# Patient Record
Sex: FEMALE | Race: WHITE | NOT HISPANIC OR LATINO | Employment: OTHER | URBAN - METROPOLITAN AREA
[De-identification: names, ages, dates, MRNs, and addresses within clinical notes are randomized per-mention and may not be internally consistent; named-entity substitution may affect disease eponyms.]

---

## 2017-01-05 ENCOUNTER — HOSPITAL ENCOUNTER (OUTPATIENT)
Dept: MAMMOGRAPHY | Facility: HOSPITAL | Age: 57
Discharge: HOME | End: 2017-01-05
Attending: OBSTETRICS & GYNECOLOGY

## 2017-01-05 DIAGNOSIS — Z12.31 VISIT FOR SCREENING MAMMOGRAM: ICD-10-CM

## 2017-10-27 ENCOUNTER — RECORDS - HEALTHEAST (OUTPATIENT)
Dept: ADMINISTRATIVE | Facility: OTHER | Age: 57
End: 2017-10-27

## 2017-11-28 ENCOUNTER — TRANSFERRED RECORDS (OUTPATIENT)
Dept: HEALTH INFORMATION MANAGEMENT | Facility: CLINIC | Age: 57
End: 2017-11-28

## 2018-01-09 ENCOUNTER — CARE COORDINATION (OUTPATIENT)
Dept: CARDIOLOGY | Facility: CLINIC | Age: 58
End: 2018-01-09

## 2018-01-09 DIAGNOSIS — I48.0 PAROXYSMAL ATRIAL FIBRILLATION (H): Primary | ICD-10-CM

## 2018-01-09 NOTE — PROGRESS NOTES
Patient calls in stating she has had atrial fibrillation 3x in past 12 months - each time has occurred after she rides her horse. She exercises without issue.     She takes Toprol XL 50mg daily.    AF symptoms:  - fast heart beats, palpitations  - slightly SOB  - fatigue  - last episode lasted ~ 8 hours (1/4/18).    Her baseline resting HR is 50-60bpm. She notices her Apple Watch sometimes registers heart rates between 120-180bpm while at rest. These subside spontaneously.    She would like to discuss if a pill in the pocket approach is appropriate for her and if she requires blood thinner.      Appt scheduled to see Dr. Burns on 1/15/17 with labs and echo prior. She will have PCP send over most recent labs (lipid profile).

## 2018-01-10 ENCOUNTER — PRE VISIT (OUTPATIENT)
Dept: CARDIOLOGY | Facility: CLINIC | Age: 58
End: 2018-01-10

## 2018-01-10 DIAGNOSIS — I48.0 PAROXYSMAL ATRIAL FIBRILLATION (H): Primary | ICD-10-CM

## 2018-01-15 ENCOUNTER — OFFICE VISIT (OUTPATIENT)
Dept: CARDIOLOGY | Facility: CLINIC | Age: 58
End: 2018-01-15
Attending: INTERNAL MEDICINE
Payer: COMMERCIAL

## 2018-01-15 ENCOUNTER — RADIANT APPOINTMENT (OUTPATIENT)
Dept: CARDIOLOGY | Facility: CLINIC | Age: 58
End: 2018-01-15
Payer: COMMERCIAL

## 2018-01-15 ENCOUNTER — HOSPITAL ENCOUNTER (OUTPATIENT)
Dept: CARDIOLOGY | Facility: CLINIC | Age: 58
Discharge: HOME | End: 2018-01-15
Attending: INTERNAL MEDICINE | Admitting: INTERNAL MEDICINE
Payer: COMMERCIAL

## 2018-01-15 VITALS
WEIGHT: 163 LBS | HEART RATE: 58 BPM | BODY MASS INDEX: 24.71 KG/M2 | OXYGEN SATURATION: 98 % | HEIGHT: 68 IN | DIASTOLIC BLOOD PRESSURE: 89 MMHG | SYSTOLIC BLOOD PRESSURE: 139 MMHG

## 2018-01-15 DIAGNOSIS — I48.0 PAROXYSMAL ATRIAL FIBRILLATION (H): ICD-10-CM

## 2018-01-15 DIAGNOSIS — I48.0 PAROXYSMAL ATRIAL FIBRILLATION (H): Primary | ICD-10-CM

## 2018-01-15 LAB
ALBUMIN SERPL-MCNC: 3.8 G/DL (ref 3.4–5)
ALP SERPL-CCNC: 51 U/L (ref 40–150)
ALT SERPL W P-5'-P-CCNC: 22 U/L (ref 0–50)
ANION GAP SERPL CALCULATED.3IONS-SCNC: 5 MMOL/L (ref 3–14)
AST SERPL W P-5'-P-CCNC: 20 U/L (ref 0–45)
BILIRUB SERPL-MCNC: 0.7 MG/DL (ref 0.2–1.3)
BUN SERPL-MCNC: 16 MG/DL (ref 7–30)
CALCIUM SERPL-MCNC: 8.7 MG/DL (ref 8.5–10.1)
CHLORIDE SERPL-SCNC: 105 MMOL/L (ref 94–109)
CO2 SERPL-SCNC: 31 MMOL/L (ref 20–32)
CREAT SERPL-MCNC: 0.85 MG/DL (ref 0.52–1.04)
ERYTHROCYTE [DISTWIDTH] IN BLOOD BY AUTOMATED COUNT: 12.4 % (ref 10–15)
GFR SERPL CREATININE-BSD FRML MDRD: 68 ML/MIN/1.7M2
GLUCOSE SERPL-MCNC: 117 MG/DL (ref 70–99)
HCT VFR BLD AUTO: 41.5 % (ref 35–47)
HGB BLD-MCNC: 13.4 G/DL (ref 11.7–15.7)
MCH RBC QN AUTO: 31.2 PG (ref 26.5–33)
MCHC RBC AUTO-ENTMCNC: 32.3 G/DL (ref 31.5–36.5)
MCV RBC AUTO: 97 FL (ref 78–100)
PLATELET # BLD AUTO: 199 10E9/L (ref 150–450)
POTASSIUM SERPL-SCNC: 3.8 MMOL/L (ref 3.4–5.3)
PROT SERPL-MCNC: 7.3 G/DL (ref 6.8–8.8)
RBC # BLD AUTO: 4.3 10E12/L (ref 3.8–5.2)
SODIUM SERPL-SCNC: 142 MMOL/L (ref 133–144)
TSH SERPL DL<=0.005 MIU/L-ACNC: 0.9 MU/L (ref 0.4–4)
WBC # BLD AUTO: 5.2 10E9/L (ref 4–11)

## 2018-01-15 PROCEDURE — G0463 HOSPITAL OUTPT CLINIC VISIT: HCPCS | Mod: 25,ZF

## 2018-01-15 PROCEDURE — 93790 AMBL BP MNTR W/SW I&R: CPT | Performed by: INTERNAL MEDICINE

## 2018-01-15 PROCEDURE — 80053 COMPREHEN METABOLIC PANEL: CPT | Performed by: INTERNAL MEDICINE

## 2018-01-15 PROCEDURE — 93786 AMBL BP MNTR W/SW REC ONLY: CPT | Performed by: INTERNAL MEDICINE

## 2018-01-15 PROCEDURE — 36415 COLL VENOUS BLD VENIPUNCTURE: CPT | Performed by: INTERNAL MEDICINE

## 2018-01-15 PROCEDURE — 93005 ELECTROCARDIOGRAM TRACING: CPT | Mod: ZF

## 2018-01-15 PROCEDURE — 84443 ASSAY THYROID STIM HORMONE: CPT | Performed by: INTERNAL MEDICINE

## 2018-01-15 PROCEDURE — 99204 OFFICE O/P NEW MOD 45 MIN: CPT | Mod: 25 | Performed by: INTERNAL MEDICINE

## 2018-01-15 PROCEDURE — 85027 COMPLETE CBC AUTOMATED: CPT | Performed by: INTERNAL MEDICINE

## 2018-01-15 ASSESSMENT — PAIN SCALES - GENERAL: PAINLEVEL: NO PAIN (0)

## 2018-01-15 NOTE — MR AVS SNAPSHOT
After Visit Summary   1/15/2018    Marilee Weston    MRN: [id number]           Patient Information     Date Of Birth          1960        Visit Information        Provider Department      1/15/2018 9:20 AM Mayra Burns MD [facility]        Today's Diagnoses     Paroxysmal atrial fibrillation (H)    -  1      Care Instructions    Cardiology Provider you saw in clinic today: Dr. Burns    Labs/Tests needed:     1. 24 hour blood pressure monitor     Follow-up Visit:  As needed    Further Instructions:  Please look into the AliveCor device        Please contact us via [employer] for questions or concerns.    Kelley Petit RN   Electrophysiology Nurse Coordinator.  [phone]    If your question concerns the schedule/appointment times, contact:  [facility]   [phone]    Device Clinic (Pacemakers, ICD, Loop Recorders)   [phone]      You will receive all normal lab and testing results via [employer] or mail if not reviewed in clinic today.   If you need a medication refill please contact your pharmacy.    As always, thank you for trusting us with your health care needs!            Follow-ups after your visit        Your next 10 appointments already scheduled     Miguel 15, 2018 11:00 AM CST   24 Hour Blood Pressure Monitor with [facility]   [facility] ([facility] [place])    [address] [facility] [address]                 Future tests that were ordered for you today     Open Future Orders        Priority Expected Expires Ordered    24 Hour Blood Pressure Monitor - Adult Routine 1/15/2018 3/1/2018 1/15/2018            Who to contact     If you have questions or need follow up information about today's clinic visit or your schedule please contact [facility] directly at [phone].  Normal or non-critical lab and imaging results will be communicated to you by MyChart, letter or phone within 4 business days after  "the clinic has received the results. If you do not hear from us within 7 days, please contact the clinic through [employer] or phone. If you have a critical or abnormal lab result, we will notify you by phone as soon as possible.  Submit refill requests through [employer] or call your pharmacy and they will forward the refill request to us. Please allow 3 business days for your refill to be completed.          Additional Information About Your Visit        [employer] Information     [employer] gives you secure access to your electronic health record. If you see a primary care provider, you can also send messages to your care team and make appointments. If you have questions, please call your primary care clinic.  If you do not have a primary care provider, please call [phone] and they will assist you.        Care EveryWhere ID     This is your Care EveryWhere ID. This could be used by other organizations to access your [place] medical records  [id number]        Your Vitals Were     Pulse Height Pulse Oximetry BMI (Body Mass Index)          58 1.727 m (5' 8\") 98% 24.78 kg/m2         Blood Pressure from Last 3 Encounters:   01/15/18 139/89   11/27/13 105/68   12/28/11 123/54    Weight from Last 3 Encounters:   01/15/18 73.9 kg (163 lb)   11/27/13 69.2 kg (152 lb 8 oz)   12/28/11 75.8 kg (167 lb)              We Performed the Following     EKG 12-lead, tracing only (Same Day)     Follow-Up with Electrophysiologist        Primary Care Provider Office Phone # Fax #    David Sierra [phone]       [facility] [address]  [place] [facility] [address]        Equal Access to Services     SANDY ALFARO : Hadii bk Carcamo, waaxda luqadaha, qaybta kaaljose ramon, brigitte richardson. So [facility] [phone].    ATENCIÓN: Si habla español, tiene a elise disposición servicios gratuitos de asistencia lingüística. Llame al [phone].    We comply with applicable " federal civil rights laws and [place] laws. We do not discriminate on the basis of race, color, national origin, age, disability, sex, sexual orientation, or gender identity.            Thank you!     Thank you for choosing [facility]  for your care. Our goal is always to provide you with excellent care. Hearing back from our patients is one way we can continue to improve our services. Please take a few minutes to complete the written survey that you may receive in the mail after your visit with us. Thank you!             Your Updated Medication List - Protect others around you: Learn how to safely use, store and throw away your medicines at www.disposemymeds.org.          This list is accurate as of: 1/15/18 10:18 AM.  Always use your most recent med list.                   Brand Name Dispense Instructions for use Diagnosis    alendronate 70 MG tablet    FOSAMAX     Take 70 mg by mouth every 7 days        aspirin 81 MG tablet      Take 1 tablet by mouth daily.        Calcium 9727-1204 MG-UNIT Chew      Take 2 tablets by mouth daily.        CLIMARA PRO 0.045-0.015 MG/DAY Ptwk   Generic drug:  Estradiol-Levonorgestrel           fish oil-omega-3 fatty acids 1000 MG capsule      Take 1 g by mouth 2 times daily.        metoprolol succinate 50 MG 24 hr tablet    TOPROL XL    30 tablet    Take 1 tablet (50 mg) by mouth daily    Atrial fibrillation (H)       PREMPRO 0.45-1.5 MG per tablet   Generic drug:  estrogen conj-medroxyPROGESTERone      Take 1 tablet by mouth daily        SYNTHROID 100 MCG tablet   Generic drug:  levothyroxine      Take 100 mcg by mouth daily.        VAGIFEM 10 MCG Tabs vaginal tablet   Generic drug:  estradiol      Place 10 mcg vaginally daily        Vitamin D-3 5000 UNITS Tabs      Take  by mouth daily.

## 2018-01-15 NOTE — PROGRESS NOTES
HPI: Purpose of visit: Patient comes for follow-up of possible 3 atrial fibrillation episodes in the last 1 year.      Ms. Marilee Weston is a very pleasant 57-year-old woman with paroxysmal atrial fibrillation.  The patient's last visit with me was in 11/2013.  At that time, patient's atrial fibrillation was well-controlled by metoprolol 25 mg twice daily.     In March 2017, October 2017, and January 4, 2018 patient had episodes of elevated heart rates while she was riding a horse.  This lasted longer than usual and she thought that they were AF episodes.  There is no ECG documentation of those episodes.      Other than female gender, patient does not have any risk factors for stroke and, therefore, does not need to be on anticoagulation.  Patient does not have established hypertension but on measurement of blood pressure today her systolic blood pressures were elevated.    Other than palpitations, patient denies any symptoms of exertional dyspnea, exertional angina, frequent lightheadedness, presyncope or syncope.          PAST MEDICAL HISTORY:  Past Medical History:   Diagnosis Date     Depression      Osteopenia      Paroxysmal atrial fibrillation (H)        CURRENT MEDICATIONS:  Current Outpatient Prescriptions   Medication Sig Dispense Refill     Estradiol-Levonorgestrel (CLIMARA PRO) 0.045-0.015 MG/DAY PTWK        metoprolol (TOPROL XL) 50 MG 24 hr tablet Take 1 tablet (50 mg) by mouth daily 30 tablet 0     alendronate (FOSAMAX) 70 MG tablet Take 70 mg by mouth every 7 days       estradiol (VAGIFEM) 10 MCG TABS Place 10 mcg vaginally daily       levothyroxine (SYNTHROID) 100 MCG tablet Take 100 mcg by mouth daily.       CALCIUM 5586-1711 MG-UNIT CHEW Take 2 tablets by mouth daily.        fish oil-omega-3 fatty acids (FISH OIL) 1000 MG capsule Take 1 g by mouth 2 times daily.       aspirin 81 MG tablet Take 1 tablet by mouth daily.       Cholecalciferol (VITAMIN D-3) 5000 UNIT TABS Take  by mouth daily.        "estrogen conj-medroxyPROGESTERone (PREMPRO) 0.45-1.5 MG per tablet Take 1 tablet by mouth daily         PAST SURGICAL HISTORY:  Past Surgical History:   Procedure Laterality Date     STRIP VEIN  1975, 1986     THYROIDECTOMY   1986    Partial for benign thyroid tumor       ALLERGIES:     Allergies   Allergen Reactions     Penicillins Rash       FAMILY HISTORY:  - Premature coronary artery disease  - Atrial fibrillation  - Sudden cardiac death     SOCIAL HISTORY:  Social History   Substance Use Topics     Smoking status: Former Smoker     Types: Cigarettes     Quit date: 1/1/1984     Smokeless tobacco: Not on file     Alcohol use Yes      Comment: Occ.       ROS:   Constitutional: No fever, chills, or sweats. Weight stable.   ENT: No visual disturbance, ear ache, epistaxis, sore throat.   Cardiovascular: As per HPI.   Respiratory: No cough, hemoptysis.    GI: No nausea, vomiting, hematemesis, melena, or hematochezia.   : No hematuria.   Integument: Negative.   Psychiatric: Negative.   Hematologic:  Easy bruising, no easy bleeding.  Neuro: Negative.   Endocrinology: No significant heat or cold intolerance   Musculoskeletal: No myalgia.    Exam:  /89 (BP Location: Right arm, Patient Position: Chair, Cuff Size: Adult Regular)  Pulse 58  Ht 1.727 m (5' 8\")  Wt 73.9 kg (163 lb)  SpO2 98%  BMI 24.78 kg/m2  GENERAL APPEARANCE: healthy, alert and no distress  HEENT: no icterus, no xanthelasmas, normal pupil size and reaction, normal palate, mucosa moist, no central cyanosis  NECK: no adenopathy, no asymmetry, masses, or scars, thyroid normal to palpation and no bruits, JVP not elevated  RESPIRATORY: lungs clear to auscultation - no rales, rhonchi or wheezes, no use of accessory muscles, no retractions, respirations are unlabored, normal respiratory rate  CARDIOVASCULAR: regular rhythm, normal S1 with physiologic split S2, no S3 or S4 and no murmur, click or rub, precordium quiet with normal PMI.  ABDOMEN: soft, " non tender, without hepatosplenomegaly, no masses palpable, bowel sounds normal, aorta not enlarged by palpation, no abdominal bruits  EXTREMITIES: peripheral pulses normal, no edema, no bruits  NEURO: alert and oriented to person/place/time, normal speech, gait and affect  VASC: Radial, femoral, dorsalis pedis and posterior tibialis pulses are normal in volumes and symmetric bilaterally. No bruits are heard.  SKIN: no ecchymoses, no rashes    Labs:  CBC RESULTS:   Lab Results   Component Value Date    WBC 5.2 01/15/2018    RBC 4.30 01/15/2018    HGB 13.4 01/15/2018    HCT 41.5 01/15/2018    MCV 97 01/15/2018    MCH 31.2 01/15/2018    MCHC 32.3 01/15/2018    RDW 12.4 01/15/2018     01/15/2018       BMP RESULTS:  Lab Results   Component Value Date     01/15/2018    POTASSIUM 3.8 01/15/2018    CHLORIDE 105 01/15/2018    CO2 31 01/15/2018    ANIONGAP 5 01/15/2018     (H) 01/15/2018    BUN 16 01/15/2018    CR 0.85 01/15/2018    GFRESTIMATED 68 01/15/2018    GFRESTBLACK 82 01/15/2018    AGUSTINA 8.7 01/15/2018        INR RESULTS:  No results found for: INR    Procedures:      Assessment and Plan:     Paroxysmal atrial fibrillation.    Although patient felt that those episodes were AF, we do not have any ECG documentation.  We discussed the various options which include pill in the pocket flecainide, daily flecainide, or watchful waiting.  After an extensive discussion we decided on watchful waiting.  To that end, I recommended an Alive Cor device to record her underlying heart rhythm the next time she has symptoms.    In regards to the question of whether or not she has hypertension, patient will wear a 24-hour blood pressure monitor.    There is no scheduled follow-up.  Patient will contact us the next time she has symptoms.    All questions and concerns were addressed and patient is happy with plan.      CC  Patient Care Team:  David Sierra MD as PCP - General (Osteopathy)  Kelley Petit RN as  Nurse Coordinator (Clinical Cardiac Electrophysiology)  Mayra Burns MD as MD (Cardiology)  CORBY SEVERINO

## 2018-01-15 NOTE — NURSING NOTE
Chief Complaint   Patient presents with     Follow Up For     Paroxysmal atrial fib. Labs, echo, EKG.     Vitals were taken and medications were reconciled. EKG was performed.    YOLANDA De La Cruz  9:18 AM

## 2018-01-15 NOTE — LETTER
1/15/2018      RE: Marilee Weston  [address] [facility] [facility] [address] [facility] [address]       Dear Colleague,    Thank you for the opportunity to participate in the care of your patient, Marilee Weston, at the [facility]. Please see a copy of my visit note below.    HPI: Purpose of visit: Patient comes for follow-up of possible 3 atrial fibrillation episodes in the last 1 year.      Ms. Marilee Weston is a very pleasant 57-year-old woman with paroxysmal atrial fibrillation.  The patient's last visit with me was in 11/2013.  At that time, patient's atrial fibrillation was well-controlled by metoprolol 25 mg twice daily.     In March 2017, October 2017, and January 4, 2018 patient had episodes of elevated heart rates while she was riding a horse.  This lasted longer than usual and she thought that they were AF episodes.  There is no ECG documentation of those episodes.      Other than female gender, patient does not have any risk factors for stroke and, therefore, does not need to be on anticoagulation.  Patient does not have established hypertension but on measurement of blood pressure today her systolic blood pressures were elevated.    Other than palpitations, patient denies any symptoms of exertional dyspnea, exertional angina, frequent lightheadedness, presyncope or syncope.          PAST MEDICAL HISTORY:  Past Medical History:   Diagnosis Date     Depression      Osteopenia      Paroxysmal atrial fibrillation (H)        CURRENT MEDICATIONS:  Current Outpatient Prescriptions   Medication Sig Dispense Refill     Estradiol-Levonorgestrel (CLIMARA PRO) 0.045-0.015 MG/DAY PTWK        metoprolol (TOPROL XL) 50 MG 24 hr tablet Take 1 tablet (50 mg) by mouth daily 30 tablet 0     alendronate (FOSAMAX) 70 MG tablet Take 70 mg by mouth every 7 days       estradiol (VAGIFEM) 10 MCG TABS Place 10 mcg vaginally daily       levothyroxine (SYNTHROID) 100 MCG tablet Take 100 mcg by  "mouth daily.       CALCIUM 3357-7767 MG-UNIT CHEW Take 2 tablets by mouth daily.        fish oil-omega-3 fatty acids (FISH OIL) 1000 MG capsule Take 1 g by mouth 2 times daily.       aspirin 81 MG tablet Take 1 tablet by mouth daily.       Cholecalciferol (VITAMIN D-3) 5000 UNIT TABS Take  by mouth daily.       estrogen conj-medroxyPROGESTERone (PREMPRO) 0.45-1.5 MG per tablet Take 1 tablet by mouth daily         PAST SURGICAL HISTORY:  Past Surgical History:   Procedure Laterality Date     STRIP VEIN  1975, 1986     THYROIDECTOMY   1986    Partial for benign thyroid tumor       ALLERGIES:     Allergies   Allergen Reactions     Penicillins Rash       FAMILY HISTORY:  - Premature coronary artery disease  - Atrial fibrillation  - Sudden cardiac death     SOCIAL HISTORY:  Social History   Substance Use Topics     Smoking status: Former Smoker     Types: Cigarettes     Quit date: 1/1/1984     Smokeless tobacco: Not on file     Alcohol use Yes      Comment: Occ.       ROS:   Constitutional: No fever, chills, or sweats. Weight stable.   ENT: No visual disturbance, ear ache, epistaxis, sore throat.   Cardiovascular: As per HPI.   Respiratory: No cough, hemoptysis.    GI: No nausea, vomiting, hematemesis, melena, or hematochezia.   : No hematuria.   Integument: Negative.   Psychiatric: Negative.   Hematologic:  Easy bruising, no easy bleeding.  Neuro: Negative.   Endocrinology: No significant heat or cold intolerance   Musculoskeletal: No myalgia.    Exam:  /89 (BP Location: Right arm, Patient Position: Chair, Cuff Size: Adult Regular)  Pulse 58  Ht 1.727 m (5' 8\")  Wt 73.9 kg (163 lb)  SpO2 98%  BMI 24.78 kg/m2  GENERAL APPEARANCE: healthy, alert and no distress  HEENT: no icterus, no xanthelasmas, normal pupil size and reaction, normal palate, mucosa moist, no central cyanosis  NECK: no adenopathy, no asymmetry, masses, or scars, thyroid normal to palpation and no bruits, JVP not elevated  RESPIRATORY: lungs " clear to auscultation - no rales, rhonchi or wheezes, no use of accessory muscles, no retractions, respirations are unlabored, normal respiratory rate  CARDIOVASCULAR: regular rhythm, normal S1 with physiologic split S2, no S3 or S4 and no murmur, click or rub, precordium quiet with normal PMI.  ABDOMEN: soft, non tender, without hepatosplenomegaly, no masses palpable, bowel sounds normal, aorta not enlarged by palpation, no abdominal bruits  EXTREMITIES: peripheral pulses normal, no edema, no bruits  NEURO: alert and oriented to person/place/time, normal speech, gait and affect  VASC: Radial, femoral, dorsalis pedis and posterior tibialis pulses are normal in volumes and symmetric bilaterally. No bruits are heard.  SKIN: no ecchymoses, no rashes    Labs:  CBC RESULTS:   Lab Results   Component Value Date    WBC 5.2 01/15/2018    RBC 4.30 01/15/2018    HGB 13.4 01/15/2018    HCT 41.5 01/15/2018    MCV 97 01/15/2018    MCH 31.2 01/15/2018    MCHC 32.3 01/15/2018    RDW 12.4 01/15/2018     01/15/2018       BMP RESULTS:  Lab Results   Component Value Date     01/15/2018    POTASSIUM 3.8 01/15/2018    CHLORIDE 105 01/15/2018    CO2 31 01/15/2018    ANIONGAP 5 01/15/2018     (H) 01/15/2018    BUN 16 01/15/2018    CR 0.85 01/15/2018    GFRESTIMATED 68 01/15/2018    GFRESTBLACK 82 01/15/2018    AGUSTINA 8.7 01/15/2018        INR RESULTS:  No results found for: INR    Procedures:      Assessment and Plan:     Paroxysmal atrial fibrillation.    Although patient felt that those episodes were AF, we do not have any ECG documentation.  We discussed the various options which include pill in the pocket flecainide, daily flecainide, or watchful waiting.  After an extensive discussion we decided on watchful waiting.  To that end, I recommended an Alive Cor device to record her underlying heart rhythm the next time she has symptoms.    In regards to the question of whether or not she has hypertension, patient will  wear a 24-hour blood pressure monitor.    There is no scheduled follow-up.  Patient will contact us the next time she has symptoms.    All questions and concerns were addressed and patient is happy with plan.      CC  Patient Care Team:  David Severino MD as PCP - General (Osteopathy)  Kelley Petit RN as Nurse Coordinator (Clinical Cardiac Electrophysiology)  Mayra Burns MD as MD (Cardiology)  DAVID SEVERINO     Results already discussed in clinic or will be discussed on a clinic visit.

## 2018-01-15 NOTE — PATIENT INSTRUCTIONS
Cardiology Provider you saw in clinic today: Dr. Burns    Labs/Tests needed:     1. 24 hour blood pressure monitor     Follow-up Visit:  As needed    Further Instructions:  Please look into the AliveCor device        Please contact us via [employer] for questions or concerns.    Kelley Petit RN   Electrophysiology Nurse Coordinator.  [phone]    If your question concerns the schedule/appointment times, contact:  [facility]   [phone]    Device Clinic (Pacemakers, ICD, Loop Recorders)   [phone]      You will receive all normal lab and testing results via [employer] or mail if not reviewed in clinic today.   If you need a medication refill please contact your pharmacy.    As always, thank you for trusting us with your health care needs!

## 2018-01-16 LAB — INTERPRETATION ECG - MUSE: NORMAL

## 2018-01-21 ENCOUNTER — RECORDS - HEALTHEAST (OUTPATIENT)
Dept: ADMINISTRATIVE | Facility: OTHER | Age: 58
End: 2018-01-21

## 2018-01-21 ENCOUNTER — HEALTH MAINTENANCE LETTER (OUTPATIENT)
Age: 58
End: 2018-01-21

## 2018-01-31 ENCOUNTER — HOSPITAL ENCOUNTER (OUTPATIENT)
Dept: MAMMOGRAPHY | Facility: HOSPITAL | Age: 58
Discharge: HOME | End: 2018-01-31
Attending: OBSTETRICS & GYNECOLOGY

## 2018-01-31 DIAGNOSIS — Z12.31 VISIT FOR SCREENING MAMMOGRAM: ICD-10-CM

## 2018-02-02 ENCOUNTER — RECORDS - HEALTHEAST (OUTPATIENT)
Dept: ADMINISTRATIVE | Facility: OTHER | Age: 58
End: 2018-02-02

## 2018-02-05 ENCOUNTER — RECORDS - HEALTHEAST (OUTPATIENT)
Dept: ADMINISTRATIVE | Facility: OTHER | Age: 58
End: 2018-02-05

## 2018-04-12 ENCOUNTER — RECORDS - HEALTHEAST (OUTPATIENT)
Dept: ADMINISTRATIVE | Facility: OTHER | Age: 58
End: 2018-04-12

## 2018-05-29 ENCOUNTER — RECORDS - HEALTHEAST (OUTPATIENT)
Dept: ADMINISTRATIVE | Facility: OTHER | Age: 58
End: 2018-05-29

## 2018-06-13 ENCOUNTER — RECORDS - HEALTHEAST (OUTPATIENT)
Dept: BONE DENSITY | Facility: CLINIC | Age: 58
End: 2018-06-13

## 2018-06-13 ENCOUNTER — RECORDS - HEALTHEAST (OUTPATIENT)
Dept: ADMINISTRATIVE | Facility: OTHER | Age: 58
End: 2018-06-13

## 2018-06-13 DIAGNOSIS — M85.80 OTHER SPECIFIED DISORDERS OF BONE DENSITY AND STRUCTURE, UNSPECIFIED SITE: ICD-10-CM

## 2018-06-18 ENCOUNTER — HOSPITAL ENCOUNTER (OUTPATIENT)
Dept: RADIOLOGY | Facility: HOSPITAL | Age: 58
Discharge: HOME | End: 2018-06-18
Attending: FAMILY MEDICINE

## 2018-06-18 ENCOUNTER — HOSPITAL ENCOUNTER (OUTPATIENT)
Dept: MRI IMAGING | Facility: HOSPITAL | Age: 58
Discharge: HOME | End: 2018-06-18
Attending: FAMILY MEDICINE

## 2018-06-18 DIAGNOSIS — S22.31XA RIGHT RIB FRACTURE: ICD-10-CM

## 2018-06-18 DIAGNOSIS — S49.90XA: ICD-10-CM

## 2018-11-12 ENCOUNTER — RECORDS - HEALTHEAST (OUTPATIENT)
Dept: ADMINISTRATIVE | Facility: OTHER | Age: 58
End: 2018-11-12

## 2019-03-02 ENCOUNTER — RECORDS - HEALTHEAST (OUTPATIENT)
Dept: ADMINISTRATIVE | Facility: OTHER | Age: 59
End: 2019-03-02

## 2019-03-08 ENCOUNTER — HOSPITAL ENCOUNTER (OUTPATIENT)
Dept: MAMMOGRAPHY | Facility: CLINIC | Age: 59
Discharge: HOME | End: 2019-03-08
Attending: FAMILY MEDICINE

## 2019-03-08 DIAGNOSIS — Z12.31 VISIT FOR SCREENING MAMMOGRAM: ICD-10-CM

## 2019-03-11 ENCOUNTER — RECORDS - HEALTHEAST (OUTPATIENT)
Dept: ADMINISTRATIVE | Facility: OTHER | Age: 59
End: 2019-03-11

## 2019-03-13 ENCOUNTER — RECORDS - HEALTHEAST (OUTPATIENT)
Dept: ADMINISTRATIVE | Facility: OTHER | Age: 59
End: 2019-03-13

## 2019-03-13 ENCOUNTER — TELEPHONE (OUTPATIENT)
Dept: CARDIOLOGY | Facility: CLINIC | Age: 59
End: 2019-03-13

## 2019-03-13 DIAGNOSIS — I48.0 PAROXYSMAL ATRIAL FIBRILLATION (H): Primary | ICD-10-CM

## 2019-03-13 DIAGNOSIS — I48.91 ATRIAL FIBRILLATION (H): ICD-10-CM

## 2019-03-13 RX ORDER — METOPROLOL SUCCINATE 50 MG/1
TABLET, EXTENDED RELEASE ORAL
Qty: 45 TABLET | Refills: 0 | Status: SHIPPED | OUTPATIENT
Start: 2019-03-13 | End: 2019-06-17

## 2019-03-13 NOTE — TELEPHONE ENCOUNTER
Patient states she has increased AF episodes that are not related to riding her horse, as they were directly correlated to in the past. They are also lasting longer (~3 hours). She is symptomatic with palpitations, racing heart. Her rates in AF are 120s-140s per [place] Sepideh. Baseline HR: 60s. BP: 135/70s.    She spoke to the cardiologist on call last night that suggested increasing metoprolol or taking metoprolol as needed with onset of AF. Patient is leaving on vacation on March 15th.      Date: 3/13/2019    Time of Call: 4:02 PM     Diagnosis:  Increasing frequency of AF      [ VORB ] Ordering provider: Lou Schaffer NP   Order:   Increase Toprol XL to 75mg daily, can take additional 25mg if onset of AF.      Order received by: Kelley Petit RN      Follow-up/additional notes:   Called and spoke to patient.   Follow-up scheduled for 4/15 at 10:30 with Dr. Burns to discuss long term plan.

## 2019-03-13 NOTE — TELEPHONE ENCOUNTER
[facility] Call Center    Phone Message    May a detailed message be left on voicemail: yes    Reason for Call: Other: Pt calling to inform Dr. Burns that she had to call the triage line late last night for an episode of a. Fib. She states she's been on metoprolol for years with infrequent episodes but now, for the last 4-5 weeks, she's had three episodes that seem to happen for no reason and lasting for three hours each time. The nurse she spoke to recommended that she follow up with Dr. Burns as he may want to increase the dosage. Pt also mentioned that she is going on a cruise for 1.5 weeks and is worried about being on the cruise and dealing with current symptoms. Please call back to discuss.    Action Taken: Message routed to:  [facility] ([facility]

## 2019-03-18 ENCOUNTER — DOCUMENTATION ONLY (OUTPATIENT)
Dept: CARE COORDINATION | Facility: CLINIC | Age: 59
End: 2019-03-18

## 2019-04-03 ENCOUNTER — RECORDS - HEALTHEAST (OUTPATIENT)
Dept: ADMINISTRATIVE | Facility: OTHER | Age: 59
End: 2019-04-03

## 2019-04-12 ASSESSMENT — ENCOUNTER SYMPTOMS
PALPITATIONS: 1
EYE IRRITATION: 1
TREMORS: 1
BACK PAIN: 1

## 2019-04-15 ENCOUNTER — OFFICE VISIT (OUTPATIENT)
Dept: CARDIOLOGY | Facility: CLINIC | Age: 59
End: 2019-04-15
Attending: INTERNAL MEDICINE
Payer: COMMERCIAL

## 2019-04-15 VITALS
HEART RATE: 61 BPM | OXYGEN SATURATION: 99 % | BODY MASS INDEX: 25.37 KG/M2 | HEIGHT: 68 IN | SYSTOLIC BLOOD PRESSURE: 154 MMHG | DIASTOLIC BLOOD PRESSURE: 93 MMHG | WEIGHT: 167.4 LBS

## 2019-04-15 DIAGNOSIS — I48.0 PAROXYSMAL ATRIAL FIBRILLATION (H): ICD-10-CM

## 2019-04-15 DIAGNOSIS — I48.0 PAROXYSMAL ATRIAL FIBRILLATION (H): Primary | ICD-10-CM

## 2019-04-15 PROCEDURE — G0463 HOSPITAL OUTPT CLINIC VISIT: HCPCS

## 2019-04-15 PROCEDURE — 93010 ELECTROCARDIOGRAM REPORT: CPT | Mod: ZP | Performed by: INTERNAL MEDICINE

## 2019-04-15 PROCEDURE — 93005 ELECTROCARDIOGRAM TRACING: CPT | Mod: ZF

## 2019-04-15 PROCEDURE — 0296T ZIO PATCH HOLTER ADULT PEDIATRIC GREATER THAN 48 HRS: CPT | Mod: ZF

## 2019-04-15 PROCEDURE — 99215 OFFICE O/P EST HI 40 MIN: CPT | Mod: ZP | Performed by: INTERNAL MEDICINE

## 2019-04-15 RX ORDER — METOPROLOL TARTRATE 25 MG/1
25 TABLET, FILM COATED ORAL PRN
Qty: 60 TABLET | Refills: 0 | Status: SHIPPED | OUTPATIENT
Start: 2019-04-15

## 2019-04-15 ASSESSMENT — MIFFLIN-ST. JEOR: SCORE: 1377.82

## 2019-04-15 ASSESSMENT — PAIN SCALES - GENERAL: PAINLEVEL: MILD PAIN (3)

## 2019-04-15 NOTE — NURSING NOTE
Chief Complaint   Patient presents with     Follow Up     Follow-up increasing, sustained episodes of AF - Toprol XL increased to 75 + 25prn.

## 2019-04-15 NOTE — LETTER
4/15/2019      RE: Marilee Weston  [address] [facility] [id number]       Dear Colleague,    Thank you for the opportunity to participate in the care of your patient, Marilee Weston, at the [facility] HEART [facility]. Please see a copy of my visit note below.    HPI: Patient comes back for follow-up of atrial fibrillation    Patient is a 58-year-old woman whom I have been following for paroxysmal atrial fibrillation.  Patient does not have any risk factors for ischemic stroke.  In the last 3-4 months, patient has experienced an increase in the frequency of atrial fibrillation episodes up to several times a month.  These episodes manifest with palpitations and discomfort in the upper chest as well as the upper back.  There are some trigger factors which include drinking beer and riding her horse.    Patient is currently taking Toprol XL 50 mg once a day and aspirin 81 mg once a day.    She denies any symptoms of exertional dyspnea, exertional angina, frequent lightheadedness, presyncope or syncope.      PAST MEDICAL HISTORY:  Past Medical History:   Diagnosis Date     Depression      Osteopenia      Paroxysmal atrial fibrillation (H)        CURRENT MEDICATIONS:  Current Outpatient Medications   Medication Sig Dispense Refill     aspirin 81 MG tablet Take 1 tablet by mouth daily.       CALCIUM 4544-7816 MG-UNIT CHEW Take 2 tablets by mouth daily.        Cholecalciferol (VITAMIN D-3) 5000 UNIT TABS Take  by mouth daily.       estradiol (VAGIFEM) 10 MCG TABS Place 10 mcg vaginally daily       Estradiol-Levonorgestrel (CLIMARA PRO) 0.045-0.015 MG/DAY PTWK        estrogen conj-medroxyPROGESTERone (PREMPRO) 0.45-1.5 MG per tablet Take 1 tablet by mouth daily       fish oil-omega-3 fatty acids (FISH OIL) 1000 MG capsule Take 1 g by mouth 2 times daily.       levothyroxine (SYNTHROID) 100 MCG tablet Take 100 mcg by mouth daily.       metoprolol succinate ER (TOPROL XL) 50 MG  "24 hr tablet Take 75mg daily. May take additional 25mg with onset of AF. 45 tablet 0     metoprolol tartrate (LOPRESSOR) 25 MG tablet Take 1 tablet (25 mg) by mouth as needed (Take 25mg for AF episodes, if persists after 1-2 hours, take another 25mg.) 60 tablet 0     alendronate (FOSAMAX) 70 MG tablet Take 70 mg by mouth every 7 days         PAST SURGICAL HISTORY:  Past Surgical History:   Procedure Laterality Date     STRIP VEIN  ,      THYROIDECTOMY       Partial for benign thyroid tumor       ALLERGIES:     Allergies   Allergen Reactions     Penicillins Rash       FAMILY HISTORY:  - Premature coronary artery disease  - Atrial fibrillation  - Sudden cardiac death     SOCIAL HISTORY:  Social History     Tobacco Use     Smoking status: Former Smoker     Types: Cigarettes     Last attempt to quit: 1984     Years since quittin.3   Substance Use Topics     Alcohol use: Yes     Comment: Occ.     Drug use: No       ROS:   Constitutional: No fever, chills, or sweats. Weight stable.   ENT: No visual disturbance, ear ache, epistaxis, sore throat.   Cardiovascular: As per HPI.   Respiratory: No cough, hemoptysis.    GI: No nausea, vomiting, hematemesis, melena, or hematochezia.   : No hematuria.   Integument: Negative.   Psychiatric: Negative.   Hematologic:  Easy bruising, no easy bleeding.  Neuro: Negative.   Endocrinology: No significant heat or cold intolerance   Musculoskeletal: No myalgia.    Exam:  BP (!) 154/93 (BP Location: Right arm, Patient Position: Chair, Cuff Size: Adult Regular)   Pulse 61   Ht 1.727 m (5' 8\")   Wt 75.9 kg (167 lb 6.4 oz)   SpO2 99%   BMI 25.45 kg/m     GENERAL APPEARANCE: healthy, alert and no distress  HEENT: no icterus, no xanthelasmas, normal pupil size and reaction, normal palate, mucosa moist, no central cyanosis  NECK: no adenopathy, no asymmetry, masses, or scars, thyroid normal to palpation and no bruits, JVP not elevated  RESPIRATORY: lungs clear to " auscultation - no rales, rhonchi or wheezes, no use of accessory muscles, no retractions, respirations are unlabored, normal respiratory rate  CARDIOVASCULAR: regular rhythm, normal S1 with physiologic split S2, no S3 or S4 and no murmur, click or rub, precordium quiet with normal PMI.  ABDOMEN: soft, non tender, without hepatosplenomegaly, no masses palpable, bowel sounds normal, aorta not enlarged by palpation, no abdominal bruits  EXTREMITIES: peripheral pulses normal, no edema, no bruits  NEURO: alert and oriented to person/place/time, normal speech, gait and affect  VASC: Radial, femoral, dorsalis pedis and posterior tibialis pulses are normal in volumes and symmetric bilaterally. No bruits are heard.  SKIN: no ecchymoses, no rashes    Labs:  CBC RESULTS:   Lab Results   Component Value Date    WBC 5.2 01/15/2018    RBC 4.30 01/15/2018    HGB 13.4 01/15/2018    HCT 41.5 01/15/2018    MCV 97 01/15/2018    MCH 31.2 01/15/2018    MCHC 32.3 01/15/2018    RDW 12.4 01/15/2018     01/15/2018       BMP RESULTS:  Lab Results   Component Value Date     01/15/2018    POTASSIUM 3.8 01/15/2018    CHLORIDE 105 01/15/2018    CO2 31 01/15/2018    ANIONGAP 5 01/15/2018     (H) 01/15/2018    BUN 16 01/15/2018    CR 0.85 01/15/2018    GFRESTIMATED 68 01/15/2018    GFRESTBLACK 82 01/15/2018    AGUSTINA 8.7 01/15/2018        INR RESULTS:  No results found for: INR    Procedures:      Assessment and Plan:     Paroxysmal atrial fibrillation-increasing in frequency    I discussed in detail the various management options which include lifestyle modifications such as exercise training, antiarrhythmic drugs, and catheter ablation.  At this juncture, patient would like to avoid antiarrhythmic drugs and catheter ablation.    We will obtain a cardiac MRI with gadolinium to have an accurate assessment of the structure and function of the heart.  We will also place a Zio Patch monitor today for 2 weeks to document the AF  burden.  I will see patient in clinic after both tests have been completed.    I also recommended discontinuing the aspirin given that it does not confer any benefit in preventing ischemic stroke.  Patient will be going to [place] for 2 weeks.  We will give her a 30-day prescription for metoprolol tartrate 25 mg which she can take up to 2 tablets when she has an acute AF episode.    All questions and concerns were addressed and patient is happy with plan.      CC  Patient Care Team:  David Severino MD as PCP - General (Osteopathy)  Kelley Petit RN as Nurse Coordinator (Clinical Cardiac Electrophysiology)  Mayra Burns MD as MD (Cardiology)  DAVID SEVERINO

## 2019-04-15 NOTE — PATIENT INSTRUCTIONS
Cardiology Provider you saw in clinic today: Dr. Burns    Medication Changes:     1. Take metoprolol tartrate 25mg at the onset of AF. If it persists after 1-2 hours, take another 25mg.     Labs/Tests needed:     1.  14 day ziopatch   2. Cardiac MRI     Follow-up Visit:  When you return from your trip in May/early June.            Please contact us via [employer] for questions or concerns.    Kelley Petit RN   Electrophysiology Nurse Coordinator.  [phone]    If your question concerns the schedule/appointment times, contact:  [facility]   [phone]    Device Clinic (Pacemakers, ICD, Loop Recorders)   [phone]      You will receive all normal lab and testing results via [employer] or mail if not reviewed in clinic today.   If you need a medication refill please contact your pharmacy.    As always, thank you for trusting us with your health care needs!

## 2019-04-15 NOTE — PROGRESS NOTES
HPI: Patient comes back for follow-up of atrial fibrillation    Patient is a 58-year-old woman whom I have been following for paroxysmal atrial fibrillation.  Patient does not have any risk factors for ischemic stroke.  In the last 3-4 months, patient has experienced an increase in the frequency of atrial fibrillation episodes up to several times a month.  These episodes manifest with palpitations and discomfort in the upper chest as well as the upper back.  There are some trigger factors which include drinking beer and riding her horse.    Patient is currently taking Toprol XL 50 mg once a day and aspirin 81 mg once a day.    She denies any symptoms of exertional dyspnea, exertional angina, frequent lightheadedness, presyncope or syncope.      PAST MEDICAL HISTORY:  Past Medical History:   Diagnosis Date     Depression      Osteopenia      Paroxysmal atrial fibrillation (H)        CURRENT MEDICATIONS:  Current Outpatient Medications   Medication Sig Dispense Refill     aspirin 81 MG tablet Take 1 tablet by mouth daily.       CALCIUM 7892-7916 MG-UNIT CHEW Take 2 tablets by mouth daily.        Cholecalciferol (VITAMIN D-3) 5000 UNIT TABS Take  by mouth daily.       estradiol (VAGIFEM) 10 MCG TABS Place 10 mcg vaginally daily       Estradiol-Levonorgestrel (CLIMARA PRO) 0.045-0.015 MG/DAY PTWK        estrogen conj-medroxyPROGESTERone (PREMPRO) 0.45-1.5 MG per tablet Take 1 tablet by mouth daily       fish oil-omega-3 fatty acids (FISH OIL) 1000 MG capsule Take 1 g by mouth 2 times daily.       levothyroxine (SYNTHROID) 100 MCG tablet Take 100 mcg by mouth daily.       metoprolol succinate ER (TOPROL XL) 50 MG 24 hr tablet Take 75mg daily. May take additional 25mg with onset of AF. 45 tablet 0     metoprolol tartrate (LOPRESSOR) 25 MG tablet Take 1 tablet (25 mg) by mouth as needed (Take 25mg for AF episodes, if persists after 1-2 hours, take another 25mg.) 60 tablet 0     alendronate (FOSAMAX) 70 MG tablet Take 70 mg  "by mouth every 7 days         PAST SURGICAL HISTORY:  Past Surgical History:   Procedure Laterality Date     STRIP VEIN  ,      THYROIDECTOMY       Partial for benign thyroid tumor       ALLERGIES:     Allergies   Allergen Reactions     Penicillins Rash       FAMILY HISTORY:  - Premature coronary artery disease  - Atrial fibrillation  - Sudden cardiac death     SOCIAL HISTORY:  Social History     Tobacco Use     Smoking status: Former Smoker     Types: Cigarettes     Last attempt to quit: 1984     Years since quittin.3   Substance Use Topics     Alcohol use: Yes     Comment: Occ.     Drug use: No       ROS:   Constitutional: No fever, chills, or sweats. Weight stable.   ENT: No visual disturbance, ear ache, epistaxis, sore throat.   Cardiovascular: As per HPI.   Respiratory: No cough, hemoptysis.    GI: No nausea, vomiting, hematemesis, melena, or hematochezia.   : No hematuria.   Integument: Negative.   Psychiatric: Negative.   Hematologic:  Easy bruising, no easy bleeding.  Neuro: Negative.   Endocrinology: No significant heat or cold intolerance   Musculoskeletal: No myalgia.    Exam:  BP (!) 154/93 (BP Location: Right arm, Patient Position: Chair, Cuff Size: Adult Regular)   Pulse 61   Ht 1.727 m (5' 8\")   Wt 75.9 kg (167 lb 6.4 oz)   SpO2 99%   BMI 25.45 kg/m    GENERAL APPEARANCE: healthy, alert and no distress  HEENT: no icterus, no xanthelasmas, normal pupil size and reaction, normal palate, mucosa moist, no central cyanosis  NECK: no adenopathy, no asymmetry, masses, or scars, thyroid normal to palpation and no bruits, JVP not elevated  RESPIRATORY: lungs clear to auscultation - no rales, rhonchi or wheezes, no use of accessory muscles, no retractions, respirations are unlabored, normal respiratory rate  CARDIOVASCULAR: regular rhythm, normal S1 with physiologic split S2, no S3 or S4 and no murmur, click or rub, precordium quiet with normal PMI.  ABDOMEN: soft, non tender, " without hepatosplenomegaly, no masses palpable, bowel sounds normal, aorta not enlarged by palpation, no abdominal bruits  EXTREMITIES: peripheral pulses normal, no edema, no bruits  NEURO: alert and oriented to person/place/time, normal speech, gait and affect  VASC: Radial, femoral, dorsalis pedis and posterior tibialis pulses are normal in volumes and symmetric bilaterally. No bruits are heard.  SKIN: no ecchymoses, no rashes    Labs:  CBC RESULTS:   Lab Results   Component Value Date    WBC 5.2 01/15/2018    RBC 4.30 01/15/2018    HGB 13.4 01/15/2018    HCT 41.5 01/15/2018    MCV 97 01/15/2018    MCH 31.2 01/15/2018    MCHC 32.3 01/15/2018    RDW 12.4 01/15/2018     01/15/2018       BMP RESULTS:  Lab Results   Component Value Date     01/15/2018    POTASSIUM 3.8 01/15/2018    CHLORIDE 105 01/15/2018    CO2 31 01/15/2018    ANIONGAP 5 01/15/2018     (H) 01/15/2018    BUN 16 01/15/2018    CR 0.85 01/15/2018    GFRESTIMATED 68 01/15/2018    GFRESTBLACK 82 01/15/2018    AGUSTINA 8.7 01/15/2018        INR RESULTS:  No results found for: INR    Procedures:      Assessment and Plan:     Paroxysmal atrial fibrillation-increasing in frequency    I discussed in detail the various management options which include lifestyle modifications such as exercise training, antiarrhythmic drugs, and catheter ablation.  At this juncture, patient would like to avoid antiarrhythmic drugs and catheter ablation.    We will obtain a cardiac MRI with gadolinium to have an accurate assessment of the structure and function of the heart.  We will also place a Zio Patch monitor today for 2 weeks to document the AF burden.  I will see patient in clinic after both tests have been completed.    I also recommended discontinuing the aspirin given that it does not confer any benefit in preventing ischemic stroke.  Patient will be going to [place] for 2 weeks.  We will give her a 30-day prescription for metoprolol tartrate 25 mg which  she can take up to 2 tablets when she has an acute AF episode.    All questions and concerns were addressed and patient is happy with plan.      CC  Patient Care Team:  David Severino MD as PCP - General (Osteopathy)  Kelley Petit RN as Nurse Coordinator (Clinical Cardiac Electrophysiology)  Mayra Burns MD as MD (Cardiology)  DAVID SEVERINO

## 2019-04-15 NOTE — PROGRESS NOTES
Per Dr. Mayra Burns, patient to have 14 day Zio patch monitor placed.  Diagnosis: Paroxysmal atrial fibrillation  Monitor placed: Yes  Patient Instructed: Yes  Patient verbalized understanding: Yes  Holter # [id number]    Placed by: Yamini Mejias CMA

## 2019-04-16 LAB — INTERPRETATION ECG - MUSE: NORMAL

## 2019-04-24 ENCOUNTER — OFFICE VISIT - HEALTHEAST (OUTPATIENT)
Dept: INTERNAL MEDICINE | Facility: CLINIC | Age: 59
End: 2019-04-24

## 2019-04-24 DIAGNOSIS — Z12.11 SCREEN FOR COLON CANCER: ICD-10-CM

## 2019-04-24 DIAGNOSIS — I48.0 PAROXYSMAL ATRIAL FIBRILLATION (H): ICD-10-CM

## 2019-04-24 DIAGNOSIS — E89.0 POSTSURGICAL HYPOTHYROIDISM: ICD-10-CM

## 2019-04-24 DIAGNOSIS — N95.2 ATROPHIC VAGINITIS: ICD-10-CM

## 2019-04-24 DIAGNOSIS — M79.673 PAIN OF FOOT, UNSPECIFIED LATERALITY: ICD-10-CM

## 2019-04-24 DIAGNOSIS — G25.89 SCAPULAR DYSKINESIS: ICD-10-CM

## 2019-04-24 DIAGNOSIS — M85.80 OSTEOPENIA, UNSPECIFIED LOCATION: ICD-10-CM

## 2019-04-24 DIAGNOSIS — S22.41XA CLOSED FRACTURE OF MULTIPLE RIBS OF RIGHT SIDE, INITIAL ENCOUNTER: ICD-10-CM

## 2019-04-24 DIAGNOSIS — N95.1 MENOPAUSAL SYNDROME (HOT FLASHES): ICD-10-CM

## 2019-04-24 ASSESSMENT — MIFFLIN-ST. JEOR: SCORE: 1347.4

## 2019-04-29 LAB
CHOL/HDLC RATIO - QUEST: 2.7
CHOLEST SERPL-MCNC: 174 MG/DL
GLUCOSE SERPL-MCNC: 96 MG/DL (ref 70–99)
HDLC SERPL-MCNC: 65 MG/DL
LDLC SERPL CALC-MCNC: 93 MG/DL
TRIGL SERPL-MCNC: 78 MG/DL
TSH SERPL-ACNC: 0.83 MCU/ML

## 2019-05-01 ENCOUNTER — HOSPITAL ENCOUNTER (OUTPATIENT)
Dept: MRI IMAGING | Facility: CLINIC | Age: 59
Discharge: HOME | End: 2019-05-01
Attending: INTERNAL MEDICINE | Admitting: INTERNAL MEDICINE
Payer: COMMERCIAL

## 2019-05-01 DIAGNOSIS — I48.0 PAROXYSMAL ATRIAL FIBRILLATION (H): ICD-10-CM

## 2019-05-01 PROCEDURE — 75561 CARDIAC MRI FOR MORPH W/DYE: CPT | Mod: 26 | Performed by: INTERNAL MEDICINE

## 2019-05-01 PROCEDURE — A9585 GADOBUTROL INJECTION: HCPCS | Performed by: INTERNAL MEDICINE

## 2019-05-01 PROCEDURE — 25500064 ZZH RX 255 OP 636: Performed by: INTERNAL MEDICINE

## 2019-05-01 PROCEDURE — 75561 CARDIAC MRI FOR MORPH W/DYE: CPT

## 2019-05-01 RX ORDER — GADOBUTROL 604.72 MG/ML
10 INJECTION INTRAVENOUS ONCE
Status: COMPLETED | OUTPATIENT
Start: 2019-05-01 | End: 2019-05-01

## 2019-05-01 RX ADMIN — GADOBUTROL 10 ML: 604.72 INJECTION INTRAVENOUS at 15:32

## 2019-05-06 ENCOUNTER — TELEPHONE (OUTPATIENT)
Dept: CARDIOLOGY | Facility: CLINIC | Age: 59
End: 2019-05-06

## 2019-05-06 NOTE — TELEPHONE ENCOUNTER
Reviewed cMRI and ziopatch (from [employer] website) with patient showing <1% AF/AFL. Many triggered events in NSR.    She states the 25mg metoprolol does help to reduce the length of her AF.     Released cMRI on [employer], mailed ziopatch result per patient preference.

## 2019-05-06 NOTE — TELEPHONE ENCOUNTER
[facility] Call Center    Phone Message    May a detailed message be left on voicemail: yes    Reason for Call: Other: Pt is leaving for [facility], pt had a few tests done (cardiac MRI and ziopatch) from last week and is wondering if she can get the results before she leaves on her trip,  please call     Action Taken: Message routed to:  [facility] ([facility]): Cardiology

## 2019-05-22 ENCOUNTER — OFFICE VISIT - HEALTHEAST (OUTPATIENT)
Dept: INTERNAL MEDICINE | Facility: CLINIC | Age: 59
End: 2019-05-22

## 2019-05-22 DIAGNOSIS — E89.0 POSTSURGICAL HYPOTHYROIDISM: ICD-10-CM

## 2019-05-22 DIAGNOSIS — H26.9 CATARACT OF BOTH EYES, UNSPECIFIED CATARACT TYPE: ICD-10-CM

## 2019-05-22 DIAGNOSIS — Z01.818 PREOPERATIVE EXAMINATION: ICD-10-CM

## 2019-05-22 DIAGNOSIS — I48.0 PAROXYSMAL ATRIAL FIBRILLATION (H): ICD-10-CM

## 2019-05-22 ASSESSMENT — MIFFLIN-ST. JEOR: SCORE: 1347.4

## 2019-06-03 ASSESSMENT — ENCOUNTER SYMPTOMS
BOWEL INCONTINENCE: 0
MEMORY LOSS: 0
NUMBNESS: 0
HEADACHES: 0
BLOATING: 0
TINGLING: 1
JAUNDICE: 0
HEARTBURN: 0
DIZZINESS: 0
CONSTIPATION: 0
RECTAL PAIN: 0
LOSS OF CONSCIOUSNESS: 0
BLOOD IN STOOL: 0
SEIZURES: 0
DISTURBANCES IN COORDINATION: 0
TREMORS: 0
WEAKNESS: 0
NAUSEA: 0
SPEECH CHANGE: 0
PARALYSIS: 0
ABDOMINAL PAIN: 0
DIARRHEA: 0
VOMITING: 0

## 2019-06-17 ENCOUNTER — OFFICE VISIT (OUTPATIENT)
Dept: CARDIOLOGY | Facility: CLINIC | Age: 59
End: 2019-06-17
Attending: INTERNAL MEDICINE
Payer: COMMERCIAL

## 2019-06-17 VITALS
WEIGHT: 164.6 LBS | HEIGHT: 68 IN | HEART RATE: 59 BPM | DIASTOLIC BLOOD PRESSURE: 77 MMHG | BODY MASS INDEX: 24.95 KG/M2 | SYSTOLIC BLOOD PRESSURE: 119 MMHG | OXYGEN SATURATION: 97 %

## 2019-06-17 DIAGNOSIS — I48.0 PAROXYSMAL ATRIAL FIBRILLATION (H): Primary | ICD-10-CM

## 2019-06-17 PROCEDURE — G0463 HOSPITAL OUTPT CLINIC VISIT: HCPCS | Mod: 25,ZF

## 2019-06-17 PROCEDURE — 99215 OFFICE O/P EST HI 40 MIN: CPT | Mod: ZP | Performed by: INTERNAL MEDICINE

## 2019-06-17 PROCEDURE — 93010 ELECTROCARDIOGRAM REPORT: CPT | Mod: ZP | Performed by: INTERNAL MEDICINE

## 2019-06-17 PROCEDURE — 93005 ELECTROCARDIOGRAM TRACING: CPT | Mod: ZF

## 2019-06-17 RX ORDER — METOPROLOL SUCCINATE 50 MG/1
75 TABLET, EXTENDED RELEASE ORAL DAILY
Qty: 135 TABLET | Refills: 3 | Status: SHIPPED | OUTPATIENT
Start: 2019-06-17 | End: 2020-07-16

## 2019-06-17 RX ORDER — CELECOXIB 50 MG/1
50 CAPSULE ORAL DAILY
COMMUNITY
End: 2021-07-14 | Stop reason: DRUGHIGH

## 2019-06-17 ASSESSMENT — PAIN SCALES - GENERAL: PAINLEVEL: NO PAIN (0)

## 2019-06-17 ASSESSMENT — MIFFLIN-ST. JEOR: SCORE: 1360.12

## 2019-06-17 NOTE — PROGRESS NOTES
HPI: Patient comes back for follow-up of atrial fibrillation     Patient is a 59-year-old woman whom I have been following for paroxysmal atrial fibrillation.  Patient does not have any risk factors for ischemic stroke.    Patient's last visit with me was in April 2019.  Since that visit patient has had atrial fibrillation episodes on April 20, May 2, June 8, and June 13.  Most of these episodes have been in the evening; 1 of it was related to horse riding when she was startled by a horse.    Patient underwent cardiac MRI with gadolinium which demonstrated normal structure and function of the heart.  Patient also underwent 2 weeks of Zio Patch monitoring which documented 1% A. fib burden.    Patient denies any symptoms of exertional dyspnea, exertional angina, frequent lightheadedness, presyncope or syncope.      PAST MEDICAL HISTORY:  Past Medical History:   Diagnosis Date     Depression      Osteopenia      Paroxysmal atrial fibrillation (H)        CURRENT MEDICATIONS:  Current Outpatient Medications   Medication Sig Dispense Refill     CALCIUM 4068-6998 MG-UNIT CHEW Take 2 tablets by mouth daily.        celecoxib (CELEBREX) 50 MG capsule Take 50 mg by mouth 2 times daily Per pt takes daily but unsure of the dosage       Collagen Hydrolysate, Bovine, POWD        estradiol (VAGIFEM) 10 MCG TABS Place 10 mcg vaginally daily       Estradiol-Levonorgestrel (CLIMARA PRO) 0.045-0.015 MG/DAY PTWK        fish oil-omega-3 fatty acids (FISH OIL) 1000 MG capsule Take 1 g by mouth 2 times daily.       Flaxseed, Linseed, (FLAXSEED OIL PO)        levothyroxine (SYNTHROID) 100 MCG tablet Take 100 mcg by mouth daily.       metoprolol succinate ER (TOPROL XL) 50 MG 24 hr tablet Take 1.5 tablets (75 mg) by mouth daily 135 tablet 3     metoprolol tartrate (LOPRESSOR) 25 MG tablet Take 1 tablet (25 mg) by mouth as needed (Take 25mg for AF episodes, if persists after 1-2 hours, take another 25mg.) 60 tablet 0       PAST SURGICAL  "HISTORY:  Past Surgical History:   Procedure Laterality Date     STRIP VEIN  ,      THYROIDECTOMY   1986    Partial for benign thyroid tumor       ALLERGIES:     Allergies   Allergen Reactions     Penicillins Rash       FAMILY HISTORY:  - Premature coronary artery disease  - Atrial fibrillation  - Sudden cardiac death     SOCIAL HISTORY:  Social History     Tobacco Use     Smoking status: Former Smoker     Types: Cigarettes     Last attempt to quit: 1984     Years since quittin.4     Smokeless tobacco: Never Used   Substance Use Topics     Alcohol use: Yes     Comment: Occ.     Drug use: No       ROS:   Constitutional: No fever, chills, or sweats. Weight stable.   ENT: No visual disturbance, ear ache, epistaxis, sore throat.   Cardiovascular: As per HPI.   Respiratory: No cough, hemoptysis.    GI: No nausea, vomiting, hematemesis, melena, or hematochezia.   : No hematuria.   Integument: Negative.   Psychiatric: Negative.   Hematologic:  Easy bruising, no easy bleeding.  Neuro: Negative.   Endocrinology: No significant heat or cold intolerance   Musculoskeletal: No myalgia.    Exam:  /77 (BP Location: Right arm, Patient Position: Chair, Cuff Size: Adult Regular)   Pulse 59   Ht 1.727 m (5' 8\")   Wt 74.7 kg (164 lb 9.6 oz)   SpO2 97%   BMI 25.03 kg/m    GENERAL APPEARANCE: healthy, alert and no distress  HEENT: no icterus, no xanthelasmas, normal pupil size and reaction, normal palate, mucosa moist, no central cyanosis  NECK: no adenopathy, no asymmetry, masses, or scars, thyroid normal to palpation and no bruits, JVP not elevated  RESPIRATORY: lungs clear to auscultation - no rales, rhonchi or wheezes, no use of accessory muscles, no retractions, respirations are unlabored, normal respiratory rate  CARDIOVASCULAR: regular rhythm, normal S1 with physiologic split S2, no S3 or S4 and no murmur, click or rub, precordium quiet with normal PMI.  ABDOMEN: soft, non tender, without " hepatosplenomegaly, no masses palpable, bowel sounds normal, aorta not enlarged by palpation, no abdominal bruits  EXTREMITIES: peripheral pulses normal, no edema, no bruits  NEURO: alert and oriented to person/place/time, normal speech, gait and affect  VASC: Radial, femoral, dorsalis pedis and posterior tibialis pulses are normal in volumes and symmetric bilaterally. No bruits are heard.  SKIN: no ecchymoses, no rashes    Labs:  CBC RESULTS:   Lab Results   Component Value Date    WBC 5.2 01/15/2018    RBC 4.30 01/15/2018    HGB 13.4 01/15/2018    HCT 41.5 01/15/2018    MCV 97 01/15/2018    MCH 31.2 01/15/2018    MCHC 32.3 01/15/2018    RDW 12.4 01/15/2018     01/15/2018       BMP RESULTS:  Lab Results   Component Value Date     01/15/2018    POTASSIUM 3.8 01/15/2018    CHLORIDE 105 01/15/2018    CO2 31 01/15/2018    ANIONGAP 5 01/15/2018    GLC 96 04/02/2019    BUN 16 01/15/2018    CR 0.85 01/15/2018    GFRESTIMATED 68 01/15/2018    GFRESTBLACK 82 01/15/2018    AGUSTINA 8.7 01/15/2018        INR RESULTS:  No results found for: INR    Procedures:      Assessment and Plan:     Paroxysmal atrial fibrillation    I discussed extensively with patient the management steps.  It is encouraging that the cardiac MRI showed normal structure and function of the heart.  We will increase metoprolol succinate to 75 mg once daily.    Patient is interested in participating in the exercise training study.  We will reach out to her to enroll her.  We will see her in clinic again in approximately 4 months.    All questions and concerns were addressed and patient is happy with plan.      CC  Patient Care Team:  David Sierra MD as PCP - General (Osteopathy)  Mayra Chopra MD as MD (Cardiology)  Kelley Petit RN as Specialty Care Coordinator (Cardiology)  MAYRA CHOPRA

## 2019-06-17 NOTE — PATIENT INSTRUCTIONS
You were seen in the Electrophysiology today by: Dr. Burns    Plan:     Follow up visit: 4 months      Your Care Team:  EP Cardiology   Telephone Number     Kelley Petit RN [phone]     For scheduling appts or procedures:    Jessica Pickett   [phone]   For the Device Clinic (Pacemakers, ICDs, Loop Recorders)    During business hours: [phone]  After business hours:   [phone]- select option 4 and ask for job code [id number].       Cardiovascular Clinic:   [address] [place] [address]. [place] [address]      As always, Thank you for trusting us with your health care needs!

## 2019-06-17 NOTE — LETTER
6/17/2019      RE: Marilee Weston  [address] [place] [id number]     Dear Colleague,    Thank you for the opportunity to participate in the care of your patient, Marilee Weston, at the [facility] HEART [facility] at [facility]. Please see a copy of my visit note below.    HPI: Patient comes back for follow-up of atrial fibrillation     Patient is a 59-year-old woman whom I have been following for paroxysmal atrial fibrillation.  Patient does not have any risk factors for ischemic stroke.    Patient's last visit with me was in April 2019.  Since that visit patient has had atrial fibrillation episodes on April 20, May 2, June 8, and June 13.  Most of these episodes have been in the evening; 1 of it was related to horse riding when she was startled by a horse.    Patient underwent cardiac MRI with gadolinium which demonstrated normal structure and function of the heart.  Patient also underwent 2 weeks of Zio Patch monitoring which documented 1% A. fib burden.    Patient denies any symptoms of exertional dyspnea, exertional angina, frequent lightheadedness, presyncope or syncope.      PAST MEDICAL HISTORY:  Past Medical History:   Diagnosis Date     Depression      Osteopenia      Paroxysmal atrial fibrillation (H)        CURRENT MEDICATIONS:  Current Outpatient Medications   Medication Sig Dispense Refill     CALCIUM 2245-8090 MG-UNIT CHEW Take 2 tablets by mouth daily.        celecoxib (CELEBREX) 50 MG capsule Take 50 mg by mouth 2 times daily Per pt takes daily but unsure of the dosage       Collagen Hydrolysate, Bovine, POWD        estradiol (VAGIFEM) 10 MCG TABS Place 10 mcg vaginally daily       Estradiol-Levonorgestrel (CLIMARA PRO) 0.045-0.015 MG/DAY PTWK        fish oil-omega-3 fatty acids (FISH OIL) 1000 MG capsule Take 1 g by mouth 2 times daily.       Flaxseed, Linseed, (FLAXSEED OIL PO)        levothyroxine (SYNTHROID) 100 MCG tablet Take 100 mcg by mouth daily.        "metoprolol succinate ER (TOPROL XL) 50 MG 24 hr tablet Take 1.5 tablets (75 mg) by mouth daily 135 tablet 3     metoprolol tartrate (LOPRESSOR) 25 MG tablet Take 1 tablet (25 mg) by mouth as needed (Take 25mg for AF episodes, if persists after 1-2 hours, take another 25mg.) 60 tablet 0       PAST SURGICAL HISTORY:  Past Surgical History:   Procedure Laterality Date     STRIP VEIN  ,      THYROIDECTOMY       Partial for benign thyroid tumor       ALLERGIES:     Allergies   Allergen Reactions     Penicillins Rash       FAMILY HISTORY:  - Premature coronary artery disease  - Atrial fibrillation  - Sudden cardiac death     SOCIAL HISTORY:  Social History     Tobacco Use     Smoking status: Former Smoker     Types: Cigarettes     Last attempt to quit: 1984     Years since quittin.4     Smokeless tobacco: Never Used   Substance Use Topics     Alcohol use: Yes     Comment: Occ.     Drug use: No       ROS:   Constitutional: No fever, chills, or sweats. Weight stable.   ENT: No visual disturbance, ear ache, epistaxis, sore throat.   Cardiovascular: As per HPI.   Respiratory: No cough, hemoptysis.    GI: No nausea, vomiting, hematemesis, melena, or hematochezia.   : No hematuria.   Integument: Negative.   Psychiatric: Negative.   Hematologic:  Easy bruising, no easy bleeding.  Neuro: Negative.   Endocrinology: No significant heat or cold intolerance   Musculoskeletal: No myalgia.    Exam:  /77 (BP Location: Right arm, Patient Position: Chair, Cuff Size: Adult Regular)   Pulse 59   Ht 1.727 m (5' 8\")   Wt 74.7 kg (164 lb 9.6 oz)   SpO2 97%   BMI 25.03 kg/m     GENERAL APPEARANCE: healthy, alert and no distress  HEENT: no icterus, no xanthelasmas, normal pupil size and reaction, normal palate, mucosa moist, no central cyanosis  NECK: no adenopathy, no asymmetry, masses, or scars, thyroid normal to palpation and no bruits, JVP not elevated  RESPIRATORY: lungs clear to auscultation - no rales, " rhonchi or wheezes, no use of accessory muscles, no retractions, respirations are unlabored, normal respiratory rate  CARDIOVASCULAR: regular rhythm, normal S1 with physiologic split S2, no S3 or S4 and no murmur, click or rub, precordium quiet with normal PMI.  ABDOMEN: soft, non tender, without hepatosplenomegaly, no masses palpable, bowel sounds normal, aorta not enlarged by palpation, no abdominal bruits  EXTREMITIES: peripheral pulses normal, no edema, no bruits  NEURO: alert and oriented to person/place/time, normal speech, gait and affect  VASC: Radial, femoral, dorsalis pedis and posterior tibialis pulses are normal in volumes and symmetric bilaterally. No bruits are heard.  SKIN: no ecchymoses, no rashes    Labs:  CBC RESULTS:   Lab Results   Component Value Date    WBC 5.2 01/15/2018    RBC 4.30 01/15/2018    HGB 13.4 01/15/2018    HCT 41.5 01/15/2018    MCV 97 01/15/2018    MCH 31.2 01/15/2018    MCHC 32.3 01/15/2018    RDW 12.4 01/15/2018     01/15/2018       BMP RESULTS:  Lab Results   Component Value Date     01/15/2018    POTASSIUM 3.8 01/15/2018    CHLORIDE 105 01/15/2018    CO2 31 01/15/2018    ANIONGAP 5 01/15/2018    GLC 96 04/02/2019    BUN 16 01/15/2018    CR 0.85 01/15/2018    GFRESTIMATED 68 01/15/2018    GFRESTBLACK 82 01/15/2018    AGUSTINA 8.7 01/15/2018     INR RESULTS:  No results found for: INR    Procedures:    Assessment and Plan:     Paroxysmal atrial fibrillation    I discussed extensively with patient the management steps.  It is encouraging that the cardiac MRI showed normal structure and function of the heart.  We will increase metoprolol succinate to 75 mg once daily.    Patient is interested in participating in the exercise training study.  We will reach out to her to enroll her.  We will see her in clinic again in approximately 4 months.    All questions and concerns were addressed and patient is happy with plan.    CC  Patient Care Team:  David Sierra MD as PCP -  General (Osteopathy)  Mayra Chopra MD as MD (Cardiology)  Kelley Petit RN as Specialty Care Coordinator (Cardiology)  MAYRA CHOPRA    Please do not hesitate to contact me if you have any questions/concerns.     Sincerely,     Mayra Chopra MD

## 2019-06-17 NOTE — NURSING NOTE
Chief Complaint   Patient presents with     Follow Up     2 mo follow-up p. AF     Vitals were taken and medications were reconciled. EKG was performed.    Yamini Mejias CMA    11:02 AM

## 2019-06-18 LAB — INTERPRETATION ECG - MUSE: NORMAL

## 2019-08-08 ENCOUNTER — RESEARCH ENCOUNTER (OUTPATIENT)
Dept: RESEARCH | Facility: CLINIC | Age: 59
End: 2019-08-08

## 2019-08-21 NOTE — PROGRESS NOTES
Trial name: [facility]   IRB #  [id number]  PI: NANI Mclean, MS  Study Coordinators: Liane Gong RN ([phone]), Heydi [place] ([phone])   The subject was approached for possible participation in the above study. The consent form was reviewed in detail including purpose, research hypothesis, nature of the research, risks & benefits.  Also reviewed were confidentiality issues.  Subject verbalized knowledge that participation is voluntary and that he/she may withdraw at any time.  Questions and concerns were addressed. Both the consent and HIPAA were signed at this time. Copies given to subject for take home.  Subject was randomized to HIIT exercise training

## 2019-09-06 ENCOUNTER — COMMUNICATION - HEALTHEAST (OUTPATIENT)
Dept: INTERNAL MEDICINE | Facility: CLINIC | Age: 59
End: 2019-09-06

## 2019-09-06 DIAGNOSIS — S22.41XA CLOSED FRACTURE OF MULTIPLE RIBS OF RIGHT SIDE, INITIAL ENCOUNTER: ICD-10-CM

## 2019-10-07 ENCOUNTER — OFFICE VISIT (OUTPATIENT)
Dept: CARDIOLOGY | Facility: CLINIC | Age: 59
End: 2019-10-07
Attending: INTERNAL MEDICINE
Payer: COMMERCIAL

## 2019-10-07 VITALS
OXYGEN SATURATION: 98 % | BODY MASS INDEX: 24.63 KG/M2 | SYSTOLIC BLOOD PRESSURE: 125 MMHG | WEIGHT: 162.5 LBS | DIASTOLIC BLOOD PRESSURE: 73 MMHG | HEART RATE: 57 BPM | HEIGHT: 68 IN

## 2019-10-07 DIAGNOSIS — I48.0 PAROXYSMAL ATRIAL FIBRILLATION (H): Primary | ICD-10-CM

## 2019-10-07 PROCEDURE — G0463 HOSPITAL OUTPT CLINIC VISIT: HCPCS | Mod: 25,ZF

## 2019-10-07 PROCEDURE — 93010 ELECTROCARDIOGRAM REPORT: CPT | Mod: ZP | Performed by: INTERNAL MEDICINE

## 2019-10-07 PROCEDURE — 99214 OFFICE O/P EST MOD 30 MIN: CPT | Mod: ZP | Performed by: INTERNAL MEDICINE

## 2019-10-07 PROCEDURE — 93005 ELECTROCARDIOGRAM TRACING: CPT | Mod: ZF

## 2019-10-07 ASSESSMENT — PAIN SCALES - GENERAL: PAINLEVEL: NO PAIN (0)

## 2019-10-07 ASSESSMENT — MIFFLIN-ST. JEOR: SCORE: 1350.6

## 2019-10-07 NOTE — LETTER
10/7/2019      RE: Marilee Weston  [address] [place] [id number]       Dear Colleague,    Thank you for the opportunity to participate in the care of your patient, Marilee Weston, at the [facility] HEART [facility] at [facility]. Please see a copy of my visit note below.    HPI: Patient comes back for follow-up of atrial fibrillation     Patient is a 59-year-old woman whom I have been following for paroxysmal atrial fibrillation.  Patient does not have any risk factors for ischemic stroke.     Patient's last visit with me was in June 2019.   Since that visit, patient participated in the exercise training program about 7 weeks ago.  Since our visit in June 2019, patient has had only one episode of atrial fibrillation a month ago lasting for few hours.  Before that, patient had many episodes in April, May, and June 2019.        PAST MEDICAL HISTORY:  Past Medical History:   Diagnosis Date     Depression      Osteopenia      Paroxysmal atrial fibrillation (H)        CURRENT MEDICATIONS:  Current Outpatient Medications   Medication Sig Dispense Refill     CALCIUM 4425-5661 MG-UNIT CHEW Take 2 tablets by mouth daily.        celecoxib (CELEBREX) 50 MG capsule Take 50 mg by mouth daily Per pt takes daily but unsure of the dosage        Collagen Hydrolysate, Bovine, POWD        estradiol (VAGIFEM) 10 MCG TABS Place 10 mcg vaginally daily       Estradiol-Levonorgestrel (CLIMARA PRO) 0.045-0.015 MG/DAY PTWK        fish oil-omega-3 fatty acids (FISH OIL) 1000 MG capsule Take 1 g by mouth 2 times daily.       Flaxseed, Linseed, (FLAXSEED OIL PO)        levothyroxine (SYNTHROID) 100 MCG tablet Take 100 mcg by mouth daily.       metoprolol succinate ER (TOPROL XL) 50 MG 24 hr tablet Take 1.5 tablets (75 mg) by mouth daily 135 tablet 3     metoprolol tartrate (LOPRESSOR) 25 MG tablet Take 1 tablet (25 mg) by mouth as needed (Take 25mg for AF episodes, if persists after 1-2 hours, take another  "25mg.) 60 tablet 0       PAST SURGICAL HISTORY:  Past Surgical History:   Procedure Laterality Date     STRIP VEIN  ,      THYROIDECTOMY       Partial for benign thyroid tumor       ALLERGIES:     Allergies   Allergen Reactions     Penicillins Rash       FAMILY HISTORY:  - Premature coronary artery disease  - Atrial fibrillation  - Sudden cardiac death     SOCIAL HISTORY:  Social History     Tobacco Use     Smoking status: Former Smoker     Types: Cigarettes     Last attempt to quit: 1984     Years since quittin.7     Smokeless tobacco: Never Used   Substance Use Topics     Alcohol use: Yes     Comment: Occ.     Drug use: No       ROS:   Constitutional: No fever, chills, or sweats. Weight stable.   ENT: No visual disturbance, ear ache, epistaxis, sore throat.   Cardiovascular: As per HPI.   Respiratory: No cough, hemoptysis.    GI: No nausea, vomiting, hematemesis, melena, or hematochezia.   : No hematuria.   Integument: Negative.   Psychiatric: Negative.   Hematologic:  Easy bruising, no easy bleeding.  Neuro: Negative.   Endocrinology: No significant heat or cold intolerance   Musculoskeletal: No myalgia.    Exam:  /73 (BP Location: Right arm, Patient Position: Chair, Cuff Size: Adult Regular)   Pulse 57   Ht 1.727 m (5' 8\")   Wt 73.7 kg (162 lb 8 oz)   SpO2 98%   BMI 24.71 kg/m     GENERAL APPEARANCE: healthy, alert and no distress  HEENT: no icterus, no xanthelasmas, normal pupil size and reaction, normal palate, mucosa moist, no central cyanosis  NECK: no adenopathy, no asymmetry, masses, or scars, thyroid normal to palpation and no bruits, JVP not elevated  RESPIRATORY: lungs clear to auscultation - no rales, rhonchi or wheezes, no use of accessory muscles, no retractions, respirations are unlabored, normal respiratory rate  CARDIOVASCULAR: regular rhythm, normal S1 with physiologic split S2, no S3 or S4 and no murmur, click or rub, precordium quiet with normal PMI.  ABDOMEN: " soft, non tender, without hepatosplenomegaly, no masses palpable, bowel sounds normal, aorta not enlarged by palpation, no abdominal bruits  EXTREMITIES: peripheral pulses normal, no edema, no bruits  NEURO: alert and oriented to person/place/time, normal speech, gait and affect  VASC: Radial, femoral, dorsalis pedis and posterior tibialis pulses are normal in volumes and symmetric bilaterally. No bruits are heard.  SKIN: no ecchymoses, no rashes    Labs:  CBC RESULTS:   Lab Results   Component Value Date    WBC 5.2 01/15/2018    RBC 4.30 01/15/2018    HGB 13.4 01/15/2018    HCT 41.5 01/15/2018    MCV 97 01/15/2018    MCH 31.2 01/15/2018    MCHC 32.3 01/15/2018    RDW 12.4 01/15/2018     01/15/2018       BMP RESULTS:  Lab Results   Component Value Date     01/15/2018    POTASSIUM 3.8 01/15/2018    CHLORIDE 105 01/15/2018    CO2 31 01/15/2018    ANIONGAP 5 01/15/2018    GLC 96 04/02/2019    BUN 16 01/15/2018    CR 0.85 01/15/2018    GFRESTIMATED 68 01/15/2018    GFRESTBLACK 82 01/15/2018    AGUSTINA 8.7 01/15/2018        INR RESULTS:  No results found for: INR    Procedures:      Assessment and Plan:     Paroxysmal atrial fibrillation    Patient is currently taking Toprol-XL 25 mg once daily which seems to be controlling her atrial fibrillation.  She is also participating in the exercise training program.  For now, I recommended continuing with current treatment and follow-up in approximately 3 months.  Prior to her follow-up, patient will wear the Zio Patch monitor for about 2 weeks.    All questions and concerns were addressed and patient is happy with plan.      CC  Patient Care Team:  David Sierra MD as PCP - General (Osteopathy)  Mayra Chopra MD as MD (Cardiology)  Kelley Petit RN as Specialty Care Coordinator (Cardiology)  MAYRA CHOPRA    Please do not hesitate to contact me if you have any questions/concerns.     Sincerely,     Mayra Chopra MD

## 2019-10-07 NOTE — NURSING NOTE
Chief Complaint   Patient presents with     Follow Up     4 mo follow-up paroxysmal afib     Vitals were taken and medications were reconciled. EKG was performed    Barbara GUERRERO  10:58 AM

## 2019-10-07 NOTE — PROGRESS NOTES
HPI: Patient comes back for follow-up of atrial fibrillation     Patient is a 59-year-old woman whom I have been following for paroxysmal atrial fibrillation.  Patient does not have any risk factors for ischemic stroke.     Patient's last visit with me was in June 2019.  Since that visit, patient participated in the exercise training program about 7 weeks ago.  Since our visit in June 2019, patient has had only one episode of atrial fibrillation a month ago lasting for few hours.  Before that, patient had many episodes in April, May, and June 2019.        PAST MEDICAL HISTORY:  Past Medical History:   Diagnosis Date     Depression      Osteopenia      Paroxysmal atrial fibrillation (H)        CURRENT MEDICATIONS:  Current Outpatient Medications   Medication Sig Dispense Refill     CALCIUM 4095-8091 MG-UNIT CHEW Take 2 tablets by mouth daily.        celecoxib (CELEBREX) 50 MG capsule Take 50 mg by mouth daily Per pt takes daily but unsure of the dosage        Collagen Hydrolysate, Bovine, POWD        estradiol (VAGIFEM) 10 MCG TABS Place 10 mcg vaginally daily       Estradiol-Levonorgestrel (CLIMARA PRO) 0.045-0.015 MG/DAY PTWK        fish oil-omega-3 fatty acids (FISH OIL) 1000 MG capsule Take 1 g by mouth 2 times daily.       Flaxseed, Linseed, (FLAXSEED OIL PO)        levothyroxine (SYNTHROID) 100 MCG tablet Take 100 mcg by mouth daily.       metoprolol succinate ER (TOPROL XL) 50 MG 24 hr tablet Take 1.5 tablets (75 mg) by mouth daily 135 tablet 3     metoprolol tartrate (LOPRESSOR) 25 MG tablet Take 1 tablet (25 mg) by mouth as needed (Take 25mg for AF episodes, if persists after 1-2 hours, take another 25mg.) 60 tablet 0       PAST SURGICAL HISTORY:  Past Surgical History:   Procedure Laterality Date     STRIP VEIN  1975, 1986     THYROIDECTOMY   1986    Partial for benign thyroid tumor       ALLERGIES:     Allergies   Allergen Reactions     Penicillins Rash       FAMILY HISTORY:  - Premature coronary artery  "disease  - Atrial fibrillation  - Sudden cardiac death     SOCIAL HISTORY:  Social History     Tobacco Use     Smoking status: Former Smoker     Types: Cigarettes     Last attempt to quit: 1984     Years since quittin.7     Smokeless tobacco: Never Used   Substance Use Topics     Alcohol use: Yes     Comment: Occ.     Drug use: No       ROS:   Constitutional: No fever, chills, or sweats. Weight stable.   ENT: No visual disturbance, ear ache, epistaxis, sore throat.   Cardiovascular: As per HPI.   Respiratory: No cough, hemoptysis.    GI: No nausea, vomiting, hematemesis, melena, or hematochezia.   : No hematuria.   Integument: Negative.   Psychiatric: Negative.   Hematologic:  Easy bruising, no easy bleeding.  Neuro: Negative.   Endocrinology: No significant heat or cold intolerance   Musculoskeletal: No myalgia.    Exam:  /73 (BP Location: Right arm, Patient Position: Chair, Cuff Size: Adult Regular)   Pulse 57   Ht 1.727 m (5' 8\")   Wt 73.7 kg (162 lb 8 oz)   SpO2 98%   BMI 24.71 kg/m    GENERAL APPEARANCE: healthy, alert and no distress  HEENT: no icterus, no xanthelasmas, normal pupil size and reaction, normal palate, mucosa moist, no central cyanosis  NECK: no adenopathy, no asymmetry, masses, or scars, thyroid normal to palpation and no bruits, JVP not elevated  RESPIRATORY: lungs clear to auscultation - no rales, rhonchi or wheezes, no use of accessory muscles, no retractions, respirations are unlabored, normal respiratory rate  CARDIOVASCULAR: regular rhythm, normal S1 with physiologic split S2, no S3 or S4 and no murmur, click or rub, precordium quiet with normal PMI.  ABDOMEN: soft, non tender, without hepatosplenomegaly, no masses palpable, bowel sounds normal, aorta not enlarged by palpation, no abdominal bruits  EXTREMITIES: peripheral pulses normal, no edema, no bruits  NEURO: alert and oriented to person/place/time, normal speech, gait and affect  VASC: Radial, femoral, dorsalis " pedis and posterior tibialis pulses are normal in volumes and symmetric bilaterally. No bruits are heard.  SKIN: no ecchymoses, no rashes    Labs:  CBC RESULTS:   Lab Results   Component Value Date    WBC 5.2 01/15/2018    RBC 4.30 01/15/2018    HGB 13.4 01/15/2018    HCT 41.5 01/15/2018    MCV 97 01/15/2018    MCH 31.2 01/15/2018    MCHC 32.3 01/15/2018    RDW 12.4 01/15/2018     01/15/2018       BMP RESULTS:  Lab Results   Component Value Date     01/15/2018    POTASSIUM 3.8 01/15/2018    CHLORIDE 105 01/15/2018    CO2 31 01/15/2018    ANIONGAP 5 01/15/2018    GLC 96 04/02/2019    BUN 16 01/15/2018    CR 0.85 01/15/2018    GFRESTIMATED 68 01/15/2018    GFRESTBLACK 82 01/15/2018    AGUSTINA 8.7 01/15/2018        INR RESULTS:  No results found for: INR    Procedures:      Assessment and Plan:     Paroxysmal atrial fibrillation    Patient is currently taking Toprol-XL 25 mg once daily which seems to be controlling her atrial fibrillation.  She is also participating in the exercise training program.  For now, I recommended continuing with current treatment and follow-up in approximately 3 months.  Prior to her follow-up, patient will wear the Zio Patch monitor for about 2 weeks.    All questions and concerns were addressed and patient is happy with plan.      CC  Patient Care Team:  David Sierra MD as PCP - General (Osteopathy)  Mayra Chopra MD as MD (Cardiology)  Kelley Petit RN as Specialty Care Coordinator (Cardiology)  MAYRA CHOPRA

## 2019-10-07 NOTE — PATIENT INSTRUCTIONS
You were seen in the Electrophysiology today by: Dr. Mayra Burns    Plan:     Follow up visit: 3 months with 14 day ziopatch worn 1 month prior (mail out)    Your Care Team:  EP Cardiology   Telephone Number     Kelley Petit RN [phone]     For scheduling appts or procedures:    Jessica Pickett   [phone]   For the Device Clinic (Pacemakers, ICDs, Loop Recorders)    During business hours: [phone]  After business hours:   [phone]- select option 4 and ask for job code [id number].       Cardiovascular Clinic:   [address] [place] [address]. [place] [address]      As always, Thank you for trusting us with your health care needs!

## 2019-10-08 LAB — INTERPRETATION ECG - MUSE: NORMAL

## 2019-10-25 ENCOUNTER — OFFICE VISIT - HEALTHEAST (OUTPATIENT)
Dept: INTERNAL MEDICINE | Facility: CLINIC | Age: 59
End: 2019-10-25

## 2019-10-25 DIAGNOSIS — E89.0 POSTSURGICAL HYPOTHYROIDISM: ICD-10-CM

## 2019-10-25 DIAGNOSIS — H26.9 CATARACT OF LEFT EYE, UNSPECIFIED CATARACT TYPE: ICD-10-CM

## 2019-10-25 DIAGNOSIS — Z01.818 PREOPERATIVE EXAMINATION: ICD-10-CM

## 2019-10-25 DIAGNOSIS — I48.0 PAROXYSMAL ATRIAL FIBRILLATION (H): ICD-10-CM

## 2019-10-25 DIAGNOSIS — M47.816 OSTEOARTHRITIS OF LUMBAR SPINE, UNSPECIFIED SPINAL OSTEOARTHRITIS COMPLICATION STATUS: ICD-10-CM

## 2019-10-25 ASSESSMENT — MIFFLIN-ST. JEOR: SCORE: 1347.4

## 2019-11-05 DIAGNOSIS — I48.0 PAROXYSMAL ATRIAL FIBRILLATION (H): Primary | ICD-10-CM

## 2019-11-11 ENCOUNTER — HOSPITAL ENCOUNTER (OUTPATIENT)
Dept: PHYSICAL MEDICINE AND REHAB | Facility: CLINIC | Age: 59
Discharge: HOME | End: 2019-11-11
Attending: INTERNAL MEDICINE

## 2019-11-11 ENCOUNTER — HOSPITAL ENCOUNTER (OUTPATIENT)
Dept: RADIOLOGY | Facility: HOSPITAL | Age: 59
Discharge: HOME | End: 2019-11-11
Attending: PHYSICAL MEDICINE & REHABILITATION

## 2019-11-11 DIAGNOSIS — M43.16 SPONDYLOLISTHESIS OF LUMBAR REGION: ICD-10-CM

## 2019-11-11 DIAGNOSIS — M51.369 DDD (DEGENERATIVE DISC DISEASE), LUMBAR: ICD-10-CM

## 2019-11-11 DIAGNOSIS — M54.50 LUMBAR SPINE PAIN: ICD-10-CM

## 2019-11-11 DIAGNOSIS — M47.816 ARTHROPATHY OF LUMBAR FACET JOINT: ICD-10-CM

## 2019-11-11 ASSESSMENT — MIFFLIN-ST. JEOR: SCORE: 1342.86

## 2019-11-12 ENCOUNTER — COMMUNICATION - HEALTHEAST (OUTPATIENT)
Dept: PHYSICAL MEDICINE AND REHAB | Facility: CLINIC | Age: 59
End: 2019-11-12

## 2019-11-15 ENCOUNTER — AMBULATORY - HEALTHEAST (OUTPATIENT)
Dept: NURSING | Facility: CLINIC | Age: 59
End: 2019-11-15

## 2019-12-06 ENCOUNTER — ALLIED HEALTH/NURSE VISIT (OUTPATIENT)
Dept: CARDIOLOGY | Facility: CLINIC | Age: 59
End: 2019-12-06
Attending: INTERNAL MEDICINE
Payer: COMMERCIAL

## 2019-12-06 DIAGNOSIS — I48.0 PAROXYSMAL ATRIAL FIBRILLATION (H): ICD-10-CM

## 2019-12-06 PROCEDURE — 0296T ZIO PATCH HOLTER ADULT PEDIATRIC GREATER THAN 48 HRS: CPT | Mod: ZF

## 2019-12-11 ENCOUNTER — COMMUNICATION - HEALTHEAST (OUTPATIENT)
Dept: SCHEDULING | Facility: CLINIC | Age: 59
End: 2019-12-11

## 2019-12-12 ENCOUNTER — OFFICE VISIT - HEALTHEAST (OUTPATIENT)
Dept: INTERNAL MEDICINE | Facility: CLINIC | Age: 59
End: 2019-12-12

## 2019-12-12 DIAGNOSIS — Z23 IMMUNIZATION DUE: ICD-10-CM

## 2019-12-12 DIAGNOSIS — R51.9 NONINTRACTABLE EPISODIC HEADACHE, UNSPECIFIED HEADACHE TYPE: ICD-10-CM

## 2019-12-12 LAB — ERYTHROCYTE [SEDIMENTATION RATE] IN BLOOD BY WESTERGREN METHOD: 5 MM/HR (ref 0–20)

## 2019-12-12 ASSESSMENT — MIFFLIN-ST. JEOR: SCORE: 1342.86

## 2019-12-13 ENCOUNTER — COMMUNICATION - HEALTHEAST (OUTPATIENT)
Dept: PHYSICAL MEDICINE AND REHAB | Facility: CLINIC | Age: 59
End: 2019-12-13

## 2019-12-16 DIAGNOSIS — I48.0 PAROXYSMAL ATRIAL FIBRILLATION (H): Primary | ICD-10-CM

## 2019-12-21 PROCEDURE — 0298T ZZC EXT ECG > 48HR TO 21 DAY REVIEW AND INTERPRETATN: CPT | Mod: ZP | Performed by: INTERNAL MEDICINE

## 2019-12-27 ENCOUNTER — HOSPITAL ENCOUNTER (OUTPATIENT)
Dept: PHYSICAL MEDICINE AND REHAB | Facility: CLINIC | Age: 59
Discharge: HOME | End: 2019-12-27
Attending: PHYSICAL MEDICINE & REHABILITATION

## 2019-12-27 DIAGNOSIS — M47.816 ARTHROPATHY OF LUMBAR FACET JOINT: ICD-10-CM

## 2019-12-27 DIAGNOSIS — M43.16 SPONDYLOLISTHESIS OF LUMBAR REGION: ICD-10-CM

## 2019-12-27 DIAGNOSIS — M54.50 LUMBAR SPINE PAIN: ICD-10-CM

## 2020-01-20 ENCOUNTER — COMMUNICATION - HEALTHEAST (OUTPATIENT)
Dept: CARDIOLOGY | Facility: CLINIC | Age: 60
End: 2020-01-20

## 2020-01-20 ENCOUNTER — HOSPITAL ENCOUNTER (OUTPATIENT)
Dept: PHYSICAL MEDICINE AND REHAB | Facility: CLINIC | Age: 60
Discharge: HOME | End: 2020-01-20
Attending: PHYSICAL MEDICINE & REHABILITATION

## 2020-01-20 DIAGNOSIS — M79.18 MYOFASCIAL PAIN: ICD-10-CM

## 2020-01-20 DIAGNOSIS — M47.816 ARTHROPATHY OF LUMBAR FACET JOINT: ICD-10-CM

## 2020-01-20 DIAGNOSIS — M43.16 SPONDYLOLISTHESIS OF LUMBAR REGION: ICD-10-CM

## 2020-01-20 DIAGNOSIS — M51.369 DDD (DEGENERATIVE DISC DISEASE), LUMBAR: ICD-10-CM

## 2020-01-20 DIAGNOSIS — M54.50 LUMBAR SPINE PAIN: ICD-10-CM

## 2020-01-20 ASSESSMENT — MIFFLIN-ST. JEOR: SCORE: 1338.33

## 2020-01-21 ENCOUNTER — HOSPITAL ENCOUNTER (OUTPATIENT)
Dept: MRI IMAGING | Facility: CLINIC | Age: 60
Discharge: HOME | End: 2020-01-21
Attending: INTERNAL MEDICINE | Admitting: INTERNAL MEDICINE
Payer: COMMERCIAL

## 2020-01-21 DIAGNOSIS — I48.0 PAROXYSMAL ATRIAL FIBRILLATION (H): ICD-10-CM

## 2020-01-21 PROCEDURE — A9585 GADOBUTROL INJECTION: HCPCS | Performed by: INTERNAL MEDICINE

## 2020-01-21 PROCEDURE — 25500064 ZZH RX 255 OP 636: Performed by: INTERNAL MEDICINE

## 2020-01-21 PROCEDURE — 75561 CARDIAC MRI FOR MORPH W/DYE: CPT

## 2020-01-21 PROCEDURE — 75561 CARDIAC MRI FOR MORPH W/DYE: CPT | Mod: 26 | Performed by: INTERNAL MEDICINE

## 2020-01-21 RX ORDER — GADOBUTROL 604.72 MG/ML
10 INJECTION INTRAVENOUS ONCE
Status: COMPLETED | OUTPATIENT
Start: 2020-01-21 | End: 2020-01-21

## 2020-01-21 RX ADMIN — GADOBUTROL 10 ML: 604.72 INJECTION INTRAVENOUS at 08:45

## 2020-01-22 ENCOUNTER — AMBULATORY - HEALTHEAST (OUTPATIENT)
Dept: CARDIOLOGY | Facility: CLINIC | Age: 60
End: 2020-01-22

## 2020-01-22 DIAGNOSIS — Z00.6 CLINICAL TRIAL EXAM: ICD-10-CM

## 2020-01-22 DIAGNOSIS — I48.0 PAROXYSMAL ATRIAL FIBRILLATION (H): ICD-10-CM

## 2020-01-22 LAB
ATRIAL RATE - MUSE: 55 BPM
DIASTOLIC BLOOD PRESSURE - MUSE: NORMAL
INTERPRETATION ECG - MUSE: NORMAL
P AXIS - MUSE: -14 DEGREES
PR INTERVAL - MUSE: 176 MS
QRS DURATION - MUSE: 92 MS
QT - MUSE: 444 MS
QTC - MUSE: 424 MS
R AXIS - MUSE: 32 DEGREES
SYSTOLIC BLOOD PRESSURE - MUSE: NORMAL
T AXIS - MUSE: 20 DEGREES
VENTRICULAR RATE- MUSE: 55 BPM

## 2020-01-22 ASSESSMENT — MIFFLIN-ST. JEOR: SCORE: 1281.63

## 2020-01-24 ENCOUNTER — COMMUNICATION - HEALTHEAST (OUTPATIENT)
Dept: CARDIOLOGY | Facility: CLINIC | Age: 60
End: 2020-01-24

## 2020-01-27 ENCOUNTER — AMBULATORY - HEALTHEAST (OUTPATIENT)
Dept: CARDIOLOGY | Facility: CLINIC | Age: 60
End: 2020-01-27

## 2020-01-31 ASSESSMENT — ENCOUNTER SYMPTOMS: BACK PAIN: 1

## 2020-02-03 ENCOUNTER — RECORDS - HEALTHEAST (OUTPATIENT)
Dept: ADMINISTRATIVE | Facility: OTHER | Age: 60
End: 2020-02-03

## 2020-02-03 ENCOUNTER — OFFICE VISIT (OUTPATIENT)
Dept: CARDIOLOGY | Facility: CLINIC | Age: 60
End: 2020-02-03
Attending: INTERNAL MEDICINE
Payer: COMMERCIAL

## 2020-02-03 VITALS
SYSTOLIC BLOOD PRESSURE: 126 MMHG | HEIGHT: 68 IN | DIASTOLIC BLOOD PRESSURE: 74 MMHG | OXYGEN SATURATION: 99 % | HEART RATE: 52 BPM | BODY MASS INDEX: 24.4 KG/M2 | WEIGHT: 161 LBS

## 2020-02-03 DIAGNOSIS — I48.0 PAROXYSMAL ATRIAL FIBRILLATION (H): Primary | ICD-10-CM

## 2020-02-03 PROCEDURE — 93005 ELECTROCARDIOGRAM TRACING: CPT | Mod: ZF

## 2020-02-03 PROCEDURE — 99213 OFFICE O/P EST LOW 20 MIN: CPT | Mod: GC | Performed by: INTERNAL MEDICINE

## 2020-02-03 PROCEDURE — G0463 HOSPITAL OUTPT CLINIC VISIT: HCPCS | Mod: 25,ZF

## 2020-02-03 RX ORDER — DULOXETIN HYDROCHLORIDE 20 MG/1
20 CAPSULE, DELAYED RELEASE ORAL 2 TIMES DAILY
COMMUNITY
End: 2021-09-01

## 2020-02-03 ASSESSMENT — MIFFLIN-ST. JEOR: SCORE: 1343.79

## 2020-02-03 ASSESSMENT — PAIN SCALES - GENERAL: PAINLEVEL: NO PAIN (0)

## 2020-02-03 NOTE — PROGRESS NOTES
February 3, 2020    Interval History:  Ms. Marilee Weston is a 59 year-old female with a history of paroxysmal atrial fibrillation and SVT who presents for follow-up. Overall, Ms. Weston has done well. She was seen in the ED in December after she developed a headache and some numbness/tingling of her skull. Work-up was negative and she was discharged with no changes in management.     Overall, she is very sensitive to atrial fibrillation and she reports 3 episodes in 2019 with HR to the 140s and acute onset of symptoms. She took her PRN Metoprolol Tartrate x2 with resolution of symptoms. She , she has had no recurrence of symptoms. She feels that anxiety is a primary trigger for her symptoms.    Zio Patch from December showed no atrial fibrillation but 41 episodes of SVT, the longest lasting 27 seconds, which she attributes to her Afib Exercise Program. EKG today showed NSR with HR 57 bpm.    PAST MEDICAL HISTORY:  Past Medical History:   Diagnosis Date     Depression      Osteopenia      Paroxysmal atrial fibrillation (H)        FAMILY HISTORY:  Family History   Problem Relation Age of Onset     Hypertension Mother      Hypertension Father      Cancer Father      Heart Disease Maternal Grandmother      Heart Disease Maternal Grandfather      Cerebrovascular Disease Paternal Grandmother          SOCIAL HISTORY:  Social History     Socioeconomic History     Marital status:      Spouse name: None     Number of children: 3     Years of education: None     Highest education level: None   Occupational History     Occupation: CPA     Employer: [employer]   Social Needs     Financial resource strain: None     Food insecurity:     Worry: None     Inability: None     Transportation needs:     Medical: None     Non-medical: None   Tobacco Use     Smoking status: Former Smoker     Types: Cigarettes     Last attempt to quit: 1984     Years since quittin.1     Smokeless tobacco: Never Used   Substance  and Sexual Activity     Alcohol use: Yes     Comment: Occ.     Drug use: No     Sexual activity: None   Lifestyle     Physical activity:     Days per week: None     Minutes per session: None     Stress: None   Relationships     Social connections:     Talks on phone: None     Gets together: None     Attends [employer] service: None     Active member of club or organization: None     Attends meetings of clubs or organizations: None     Relationship status: None     Intimate partner violence:     Fear of current or ex partner: None     Emotionally abused: None     Physically abused: None     Forced sexual activity: None   Other Topics Concern     Parent/sibling w/ CABG, MI or angioplasty before 65F 55M? Not Asked   Social History Narrative     None       CURRENT MEDICATIONS:  Current Outpatient Medications   Medication Sig Dispense Refill     calcium carbonate 600 mg-vitamin D 400 units (CALTRATE) 600-400 MG-UNIT per tablet Take 1 tablet by mouth 2 times daily       Collagen Hydrolysate, Bovine, POWD        DULoxetine (CYMBALTA) 20 MG capsule Take 20 mg by mouth 2 times daily       estradiol (VAGIFEM) 10 MCG TABS Place 10 mcg vaginally daily       Estradiol-Levonorgestrel (CLIMARA PRO) 0.045-0.015 MG/DAY PTWK        fish oil-omega-3 fatty acids (FISH OIL) 1000 MG capsule Take 1 g by mouth 2 times daily.       Flaxseed, Linseed, (FLAXSEED OIL PO)        levothyroxine (SYNTHROID) 100 MCG tablet Take 100 mcg by mouth daily.       metoprolol succinate ER (TOPROL XL) 50 MG 24 hr tablet Take 1.5 tablets (75 mg) by mouth daily 135 tablet 3     metoprolol tartrate (LOPRESSOR) 25 MG tablet Take 1 tablet (25 mg) by mouth as needed (Take 25mg for AF episodes, if persists after 1-2 hours, take another 25mg.) 60 tablet 0     CALCIUM 4770-2377 MG-UNIT CHEW Take 2 tablets by mouth daily.        celecoxib (CELEBREX) 50 MG capsule Take 50 mg by mouth daily Per pt takes daily but unsure of the dosage          ROS:   10 point ROS  "negative except HPI    EXAM:  /74 (BP Location: Right arm, Patient Position: Chair, Cuff Size: Adult Regular)   Pulse 52   Ht 1.727 m (5' 8\")   Wt 73 kg (161 lb)   SpO2 99%   BMI 24.48 kg/m    Home weight  General: appears comfortable, alert and articulate  Head: normocephalic, atraumatic  Eyes: anicteric sclera, EOMI  Neck: no adenopathy  Orophyarynx: moist mucosa, no lesions, dentition intact  Heart: regular, S1/S2, no murmur, gallop, rub.  Lungs: clear, no rales or wheezing  Abdomen: soft, non-tender, bowel sounds present, no hepatosplenomegaly  Extremities: no clubbing, cyanosis or edema  Neurological: normal speech and affect, no gross motor deficits    Labs:  CBC RESULTS:  Lab Results   Component Value Date    WBC 5.2 01/15/2018    RBC 4.30 01/15/2018    HGB 13.4 01/15/2018    HCT 41.5 01/15/2018    MCV 97 01/15/2018    MCH 31.2 01/15/2018    MCHC 32.3 01/15/2018    RDW 12.4 01/15/2018     01/15/2018       CMP RESULTS:  Lab Results   Component Value Date     01/15/2018    POTASSIUM 3.8 01/15/2018    CHLORIDE 105 01/15/2018    CO2 31 01/15/2018    ANIONGAP 5 01/15/2018    GLC 96 04/02/2019    BUN 16 01/15/2018    CR 0.85 01/15/2018    GFRESTIMATED 68 01/15/2018    GFRESTBLACK 82 01/15/2018    AGUSTINA 8.7 01/15/2018    BILITOTAL 0.7 01/15/2018    ALBUMIN 3.8 01/15/2018    ALKPHOS 51 01/15/2018    ALT 22 01/15/2018    AST 20 01/15/2018        Zio Patch:      Cardiac MRI, 1/21/2020:  1. The LV is normal in cavity size and wall thickness. The global systolic function is normal. The LVEF is  56%. There are no regional wall motion abnormalities.     2. The RV is normal in cavity size. The global systolic function is normal. The RVEF is 59%.      3. Both atria are mildly enlarged.      4. There is mild aortic regurgitation.     5. Late gadolinium enhancement imaging shows no MI, fibrosis or infiltrative disease.      6. There is no pericardial effusion or thickening.     7. There is no intracardiac " thrombus.     CONCLUSIONS: Normal biventricular function without evidence of myocardial fibrosis. Compared to the prior  examination dated 05/01/2019, there is no change.      Assessment and Plan:   Ms. Marilee Weston is a 59 year-old female with a history of paroxysmal atrial fibrillation and SVT who presents for follow-up.    1. Atrial Fibrillation  2. SVT  Patient asymptomatic since November 2020. Zio Patch 12/2019 showed no atrial fibrillation but multiple, short runs of SVT that may have been associated with exercise. Cardiac MRI was unremarkable. Reassurance was provided and we will plan to continue her current treatment plan.  -Continue Metoprolol Succinate 75 mg daily and Metoprolol Tartrate 25 mg PRN  -Follow-up in 1 year.    Patient discussed with the attending cardiologist, Dr. Burns.    Antony Holman MD  Cardiology Fellow, PGY-6  Pager #[phone]    I have seen, interviewed, and examined patient. I have reviewed the laboratory tests, imaging, and other investigations. I have reviewed the management plan with the patient. I discussed with the team and agree with the findings and plan in this resident/fellow/nurse practitioner's note. In addition, changes in the physical examination, assessment and plan have been incorporated into the note by myself, as to make it a single cohesive document.       Mayra Burns MD, MS  Cardiology/Cardiac EP Attending Staff

## 2020-02-03 NOTE — PATIENT INSTRUCTIONS
You were seen in the Electrophysiology today by: Dr. Mayra Burns    Plan:     Follow up visit: 1 year      Your Care Team:  EP Cardiology   Telephone Number     Kelley Petit RN [phone]     For scheduling appts or procedures:    Jessica Pickett   [phone]   For the Device Clinic (Pacemakers, ICDs, Loop Recorders)    During business hours: [phone]  After business hours:   [phone]- select option 4 and ask for job code [id number].       Cardiovascular Clinic:   [address] [place] [address]. [place] [address]      As always, Thank you for trusting us with your health care needs!

## 2020-02-03 NOTE — NURSING NOTE
Chief Complaint   Patient presents with     Follow Up     3 mo follow-up paroxysmal atrial fibrillation. Review zio.     Vitals were taken and medications were reconciled. EKG was performed.    Yamini Mejias CMA    3:44 PM

## 2020-02-03 NOTE — LETTER
2/3/2020      RE: Marilee Weston  [address] [facility] [id number]       Dear Colleague,    Thank you for the opportunity to participate in the care of your patient, Marilee Weston, at the [facility]. Please see a copy of my visit note below.    February 3, 2020    Interval History:  Ms. Marilee Weston is a 59 year-old female with a history of paroxysmal atrial fibrillation and SVT who presents for follow-up. Overall, Ms. Weston has done well. She was seen in the ED in December after she developed a headache and some numbness/tingling of her skull. Work-up was negative and she was discharged with no changes in management.     Overall, she is very sensitive to atrial fibrillation and she reports 3 episodes in November 2019 with HR to the 140s and acute onset of symptoms. She took her PRN Metoprolol Tartrate x2 with resolution of symptoms. She November 25, she has had no recurrence of symptoms. She feels that anxiety is a primary trigger for her symptoms.    Zio Patch from December showed no atrial fibrillation but 41 episodes of SVT, the longest lasting 27 seconds, which she attributes to her Afib Exercise Program. EKG today showed NSR with HR 57 bpm.    PAST MEDICAL HISTORY:  Past Medical History:   Diagnosis Date     Depression      Osteopenia      Paroxysmal atrial fibrillation (H)        FAMILY HISTORY:  Family History   Problem Relation Age of Onset     Hypertension Mother      Hypertension Father      Cancer Father      Heart Disease Maternal Grandmother      Heart Disease Maternal Grandfather      Cerebrovascular Disease Paternal Grandmother          SOCIAL HISTORY:  Social History     Socioeconomic History     Marital status:      Spouse name: None     Number of children: 3     Years of education: None     Highest education level: None   Occupational History     Occupation: [employer]     Employer: SELF   Social Needs     Financial resource  strain: None     Food insecurity:     Worry: None     Inability: None     Transportation needs:     Medical: None     Non-medical: None   Tobacco Use     Smoking status: Former Smoker     Types: Cigarettes     Last attempt to quit: 1984     Years since quittin.1     Smokeless tobacco: Never Used   Substance and Sexual Activity     Alcohol use: Yes     Comment: Occ.     Drug use: No     Sexual activity: None   Lifestyle     Physical activity:     Days per week: None     Minutes per session: None     Stress: None   Relationships     Social connections:     Talks on phone: None     Gets together: None     Attends Tenriism service: None     Active member of club or organization: None     Attends meetings of clubs or organizations: None     Relationship status: None     Intimate partner violence:     Fear of current or ex partner: None     Emotionally abused: None     Physically abused: None     Forced sexual activity: None   Other Topics Concern     Parent/sibling w/ CABG, MI or angioplasty before 65F 55M? Not Asked   Social History Narrative     None       CURRENT MEDICATIONS:  Current Outpatient Medications   Medication Sig Dispense Refill     calcium carbonate 600 mg-vitamin D 400 units (CALTRATE) 600-400 MG-UNIT per tablet Take 1 tablet by mouth 2 times daily       Collagen Hydrolysate, Bovine, POWD        DULoxetine (CYMBALTA) 20 MG capsule Take 20 mg by mouth 2 times daily       estradiol (VAGIFEM) 10 MCG TABS Place 10 mcg vaginally daily       Estradiol-Levonorgestrel (CLIMARA PRO) 0.045-0.015 MG/DAY PTWK        fish oil-omega-3 fatty acids (FISH OIL) 1000 MG capsule Take 1 g by mouth 2 times daily.       Flaxseed, Linseed, (FLAXSEED OIL PO)        levothyroxine (SYNTHROID) 100 MCG tablet Take 100 mcg by mouth daily.       metoprolol succinate ER (TOPROL XL) 50 MG 24 hr tablet Take 1.5 tablets (75 mg) by mouth daily 135 tablet 3     metoprolol tartrate (LOPRESSOR) 25 MG tablet Take 1 tablet (25 mg) by  "mouth as needed (Take 25mg for AF episodes, if persists after 1-2 hours, take another 25mg.) 60 tablet 0     CALCIUM 6556-3919 MG-UNIT CHEW Take 2 tablets by mouth daily.        celecoxib (CELEBREX) 50 MG capsule Take 50 mg by mouth daily Per pt takes daily but unsure of the dosage          ROS:   10 point ROS negative except HPI    EXAM:  /74 (BP Location: Right arm, Patient Position: Chair, Cuff Size: Adult Regular)   Pulse 52   Ht 1.727 m (5' 8\")   Wt 73 kg (161 lb)   SpO2 99%   BMI 24.48 kg/m     Home weight  General: appears comfortable, alert and articulate  Head: normocephalic, atraumatic  Eyes: anicteric sclera, EOMI  Neck: no adenopathy  Orophyarynx: moist mucosa, no lesions, dentition intact  Heart: regular, S1/S2, no murmur, gallop, rub.  Lungs: clear, no rales or wheezing  Abdomen: soft, non-tender, bowel sounds present, no hepatosplenomegaly  Extremities: no clubbing, cyanosis or edema  Neurological: normal speech and affect, no gross motor deficits    Labs:  CBC RESULTS:  Lab Results   Component Value Date    WBC 5.2 01/15/2018    RBC 4.30 01/15/2018    HGB 13.4 01/15/2018    HCT 41.5 01/15/2018    MCV 97 01/15/2018    MCH 31.2 01/15/2018    MCHC 32.3 01/15/2018    RDW 12.4 01/15/2018     01/15/2018       CMP RESULTS:  Lab Results   Component Value Date     01/15/2018    POTASSIUM 3.8 01/15/2018    CHLORIDE 105 01/15/2018    CO2 31 01/15/2018    ANIONGAP 5 01/15/2018    GLC 96 04/02/2019    BUN 16 01/15/2018    CR 0.85 01/15/2018    GFRESTIMATED 68 01/15/2018    GFRESTBLACK 82 01/15/2018    AGUSTINA 8.7 01/15/2018    BILITOTAL 0.7 01/15/2018    ALBUMIN 3.8 01/15/2018    ALKPHOS 51 01/15/2018    ALT 22 01/15/2018    AST 20 01/15/2018        Zio Patch:      Cardiac MRI, 1/21/2020:  1. The LV is normal in cavity size and wall thickness. The global systolic function is normal. The LVEF is  56%. There are no regional wall motion abnormalities.     2. The RV is normal in cavity size. The " global systolic function is normal. The RVEF is 59%.      3. Both atria are mildly enlarged.      4. There is mild aortic regurgitation.     5. Late gadolinium enhancement imaging shows no MI, fibrosis or infiltrative disease.      6. There is no pericardial effusion or thickening.     7. There is no intracardiac thrombus.     CONCLUSIONS: Normal biventricular function without evidence of myocardial fibrosis. Compared to the prior  examination dated 05/01/2019, there is no change.      Assessment and Plan:   Ms. Marilee Weston is a 59 year-old female with a history of paroxysmal atrial fibrillation and SVT who presents for follow-up.    1. Atrial Fibrillation  2. SVT  Patient asymptomatic since November 2020. Zio Patch 12/2019 showed no atrial fibrillation but multiple, short runs of SVT that may have been associated with exercise. Cardiac MRI was unremarkable. Reassurance was provided and we will plan to continue her current treatment plan.  -Continue Metoprolol Succinate 75 mg daily and Metoprolol Tartrate 25 mg PRN  -Follow-up in 1 year.    Patient discussed with the attending cardiologist, Dr. Burns.    Antony Holman MD  Cardiology Fellow, PGY-6  Pager #[phone]    I have seen, interviewed, and examined patient. I have reviewed the laboratory tests, imaging, and other investigations. I have reviewed the management plan with the patient. I discussed with the team and agree with the findings and plan in this resident/fellow/nurse practitioner's note. In addition, changes in the physical examination, assessment and plan have been incorporated into the note by myself, as to make it a single cohesive document.       Mayra Burns MD, MS  Cardiology/Cardiac EP Attending Staff

## 2020-02-04 LAB — INTERPRETATION ECG - MUSE: NORMAL

## 2020-02-17 ENCOUNTER — HOSPITAL ENCOUNTER (OUTPATIENT)
Dept: PHYSICAL MEDICINE AND REHAB | Facility: CLINIC | Age: 60
Discharge: HOME | End: 2020-02-17
Attending: PHYSICAL MEDICINE & REHABILITATION

## 2020-02-17 DIAGNOSIS — M53.3 SACROILIAC JOINT PAIN: ICD-10-CM

## 2020-02-17 DIAGNOSIS — M43.16 SPONDYLOLISTHESIS OF LUMBAR REGION: ICD-10-CM

## 2020-02-17 DIAGNOSIS — M54.50 LUMBAR SPINE PAIN: ICD-10-CM

## 2020-02-17 DIAGNOSIS — M47.816 ARTHROPATHY OF LUMBAR FACET JOINT: ICD-10-CM

## 2020-02-17 DIAGNOSIS — M79.18 MYOFASCIAL PAIN: ICD-10-CM

## 2020-03-01 ENCOUNTER — HEALTH MAINTENANCE LETTER (OUTPATIENT)
Age: 60
End: 2020-03-01

## 2020-03-05 ENCOUNTER — COMMUNICATION - HEALTHEAST (OUTPATIENT)
Dept: SCHEDULING | Facility: CLINIC | Age: 60
End: 2020-03-05

## 2020-03-16 ENCOUNTER — VIRTUAL VISIT (OUTPATIENT)
Dept: FAMILY MEDICINE | Facility: OTHER | Age: 60
End: 2020-03-16

## 2020-03-16 NOTE — PROGRESS NOTES
"Date: 2020 14:09:42  Clinician: Annamarie Brock  Clinician NPI: [id number]  Patient: Marilee Weston  Patient : 1960  Patient Address: [address] [place] [address]  Patient Phone: [phone]  Visit Protocol: URI  Patient Summary:  Marilee is a 59 year old ( : 1960 ) female who initiated a Visit for COVID-19 (Coronavirus) evaluation and screening. When asked the question \"Please sign me up to receive news, health information and promotions from [employer].\", Marilee responded \"No\".    Marilee states her symptoms started gradually 2-3 weeks ago.   Her symptoms consist of a headache, facial pain or pressure, a cough, nasal congestion, and tooth pain.   Symptom details     Nasal secretions: The color of her mucus is yellow.    Cough: Marilee coughs a few times an hour and her cough is not more bothersome at night. Phlegm does not come into her throat when she coughs. She believes her cough is caused by post-nasal drip.     Facial pain or pressure: The facial pain or pressure feels worse when bending over or leaning forward.     Headache: She states the headache is mild (1-3 on a 10 point pain scale).     Tooth pain: The tooth pain is not caused by a cavity, recent dental work, or other mouth problems.      Marilee denies having ear pain, malaise, rhinitis, enlarged lymph nodes, myalgias, wheezing, sore throat, fever, and chills. She also denies having recent facial or sinus surgery in the past 60 days, double sickening (worsening symptoms after initial improvement), and having a sinus infection within the past year. She is not experiencing dyspnea.   Precipitating events  She has not recently been exposed to someone with influenza. Marilee has been in close contact with the following high risk individuals: adults 65 or older and people with asthma, heart disease or diabetes.   Pertinent COVID-19 (Coronavirus) information  Marilee has traveled internationally or to the areas where COVID-19 (Coronavirus) " is widespread in the last 14 days before the start of her symptoms. Countries or locations traveled as reported by the patient (free text): 14 days prior I was in the [place] airport and travelled from [place] to [place].  I also flew after that to [place] and was in [place] skiing when the symptoms started, 14 days after being in [place].   Marilee has not had close contact with a suspected or laboratory-confirmed COVID-19 patient within 14 days of symptom onset.   Marilee is not a healthcare worker and does not work in a healthcare facility.   Pertinent medical history  Marilee has taken an antibiotic medication in the past month. Antibiotic details as reported by the patient (free text): Doxycycline Hydrate 100 mg for sinus infection, Q=14, taken March 7 - March 13   Marilee does not get yeast infections when she takes antibiotics.   Marilee does not need a return to work/school note.   Weight: 156 lbs   Marilee does not smoke or use smokeless tobacco.   Additional information as reported by the patient (free text): I am using netti pot and Alkelol to help clear my sinuses, humidifyer at night.  it seems better but not clearing up.  In the past I've had difficulty getting rid of sinus infections.  The antibiotic seemed to help initially making the achiness and possibly low grade fever go away, but the cough has started about 5 days ago and that is not going away and may be getting worse.   Weight: 156 lbs    MEDICATIONS: estradiol vaginal, duloxetine oral, Climara Pro transdermal, levothyroxine oral, metoprolol succinate oral, ALLERGIES: Penicillins  Clinician Response:  Dear Marilee,  Based on the information provided, you have acute bacterial sinusitis, also known as a sinus infection. Sinus infections are caused by bacteria or a virus and symptoms are almost always identical. The difference between the 2 types of infections is timing.  Sinus infections start as viral infections and symptoms improve on their own in about 7  days. If symptoms have not improved after 7 days or have even worsened, a bacterial infection may have developed.  Medication information  I am prescribing:     Fluticasone 50 mcg/actuation nasal spray. Inhale 2 sprays in each nostril 1 time per day; after 1 week, may adjust to 1 - 2 sprays in each nostril 1 time per day. This medication takes several days to start working, so keep taking it even if it doesn't help right away. There are no refills with this prescription.   Unless you are allergic to the over-the-counter medication(s) below, I recommend using:     A decongestant such as Sudafed PE or store brand.    A sinus irrigation kit such as Sinus Rinse, Neti Pot, SinuCleanse, or store brand. Be sure to use sterile or previously boiled water to prevent unwanted infections.     Over-the-counter medications do not require a prescription. Ask the pharmacist if you have any questions.  Self care  The following tips will keep you as comfortable as possible while you recover:     Rest    Drink plenty of water and other liquids    Take a hot shower to loosen congestion    Take a spoonful of honey to reduce your cough     When to seek care  Please be seen in a clinic or urgent care if any of the following occur:     Symptoms do not start to improve after 3 days of treatment    New symptoms develop, or symptoms become worse     Please be seen in a clinic or urgent care if new symptoms develop, or symptoms become worse.  Additional treatment plan   Based on the information you have provided, it is recommended that you go to one of our designated Coronavirus (Covid-19) testing centers to get a test done from your car.  We are offering testing from your car in [facility], [facility], [facility] and [place].   To schedule a visit at [facility] or [facility], please call [phone] to find out when the next available testing window is.  Testing will be done in 1 hour blocks so that you can wait at home until we are available to more quickly perform your testing from the car.   To complete testing in [facility] ONLY, follow the instructions below:    Go as soon as possible during the hours noted to [facility] [address], [place] [address]. Hours: M-F 7:30am-5pm    What to expect:   When you arrive please come park in the parking lot.   Be prepared to present photo ID   Call [phone] and let them know you have arrived.    They will ask for information to get you registered for a visit and will ask for the description of your car and where you are parked.    They will add you to the queue to get your test (you will stay in your car the entire time).   You will then be met by a provider who will perform a brief assessment in your car and collect samples to test for coronavirus and possibly influenza or RSV.    Isolate yourself while traveling.  Do Not allow any visitors within 6 feet.  Do Not go to work or school.  Do Not go to Judaism,  centers, shopping, or other public places.  Do Not shake hands.  Avoid close contact with others (hugging, kissing).Protect Others:     Cover Your Mouth and Nose with a mask, disposable tissue or wash cloth to avoid spreading germs to others.  Wash your hands and face frequently with soap and water   Fever Medicines:    For fever relief, take acetaminophen or ibuprofen.  Treat fevers above 101deg F (38.3deg C) to lower fevers and make you more comfortable.   Acetaminophen (e.g., Tylenol): Take 650 mg (two 325 mg pills) by mouth every 4-6 hours as needed of regular strength Tylenol or 1,000 mg (two 500 mg pills) every 8 hours as needed of Extra Strength Tylenol.   Ibuprofen (e.g., Motrin, Advil): Take 400 mg (two 200 mg pills) by mouth every 6 hours as needed.   Acetaminophen is thought to be safer than ibuprofen or naproxen for people over 65 years old.  Acetaminophen is in many OTC and prescription medicines. It might be in more than one medicine that you are taking. You need to be careful and not take an overdose. Before taking any medicine, read all the instructions on the package.  Caution -NSAIDs (e.g., ibuprofen, naproxen): Do not take nonsteroidal anti-inflammatory drugs (NSAIDs) if you have stomach problems, kidney disease, heart failure, or other contraindications to using this type of medicine. Do not take NSAID medicines for over 7 days without consulting your PCP. Do not take NSAID medicines if you are pregnant. Do not take NSAID medicines if you are also taking blood thinners.    Call or submit a new visit if: Breathing difficulty develops or you become worse.  Thank you for limiting contact with others, wearing a simple mask to cover your cough, practice good hand hygiene habits and accessing our virtual services where possible to limit the spread of this virus.  For more information about COVID19 and options for caring for yourself at home, please visit the CDC website at https://www.cdc.gov/coronavirus/2019-ncov/about/steps-when-sick.html  For more options for care at [facility], please visit our website at [email or web address]    Diagnosis: Cough  Diagnosis ICD: R05  Prescription: fluticasone 50 mcg/actuation nasal spray,suspension 1 120 spray aerosol with adapter (grams), 30 days supply. Inhale 2 sprays in each nostril 1 time per day; after 1 week, may adjust to 1 - 2 sprays in each nostril 1 time per day.. Refills: 0, Refill as needed: no, Allow substitutions: yes  Pharmacy: [facility] #[id number] - [phone] - [address] , [place] [address]

## 2020-03-17 ENCOUNTER — OFFICE VISIT - HEALTHEAST (OUTPATIENT)
Dept: FAMILY MEDICINE | Facility: CLINIC | Age: 60
End: 2020-03-17

## 2020-03-17 DIAGNOSIS — R05.9 COUGH: ICD-10-CM

## 2020-04-20 ENCOUNTER — AMBULATORY - HEALTHEAST (OUTPATIENT)
Dept: INTERNAL MEDICINE | Facility: CLINIC | Age: 60
End: 2020-04-20

## 2020-04-20 ENCOUNTER — COMMUNICATION - HEALTHEAST (OUTPATIENT)
Dept: FAMILY MEDICINE | Facility: CLINIC | Age: 60
End: 2020-04-20

## 2020-04-20 DIAGNOSIS — Z23 NEED FOR VACCINATION: ICD-10-CM

## 2020-04-23 ENCOUNTER — AMBULATORY - HEALTHEAST (OUTPATIENT)
Dept: NURSING | Facility: CLINIC | Age: 60
End: 2020-04-23

## 2020-04-23 DIAGNOSIS — Z23 NEED FOR VACCINATION: ICD-10-CM

## 2020-05-12 ENCOUNTER — HOSPITAL ENCOUNTER (OUTPATIENT)
Dept: MAMMOGRAPHY | Facility: CLINIC | Age: 60
Discharge: HOME | End: 2020-05-12
Attending: INTERNAL MEDICINE

## 2020-05-12 DIAGNOSIS — Z12.31 VISIT FOR SCREENING MAMMOGRAM: ICD-10-CM

## 2020-06-01 ENCOUNTER — COMMUNICATION - HEALTHEAST (OUTPATIENT)
Dept: PHYSICAL MEDICINE AND REHAB | Facility: CLINIC | Age: 60
End: 2020-06-01

## 2020-06-01 DIAGNOSIS — M43.16 SPONDYLOLISTHESIS OF LUMBAR REGION: ICD-10-CM

## 2020-06-01 DIAGNOSIS — M54.50 LUMBAR SPINE PAIN: ICD-10-CM

## 2020-06-01 DIAGNOSIS — M47.816 ARTHROPATHY OF LUMBAR FACET JOINT: ICD-10-CM

## 2020-06-07 ENCOUNTER — NURSE TRIAGE (OUTPATIENT)
Dept: NURSING | Facility: CLINIC | Age: 60
End: 2020-06-07

## 2020-06-07 ENCOUNTER — E-VISIT (OUTPATIENT)
Dept: PEDIATRICS | Facility: CLINIC | Age: 60
End: 2020-06-07
Payer: COMMERCIAL

## 2020-06-07 ENCOUNTER — VIRTUAL VISIT (OUTPATIENT)
Dept: FAMILY MEDICINE | Facility: OTHER | Age: 60
End: 2020-06-07

## 2020-06-07 DIAGNOSIS — Z53.9 ERRONEOUS ENCOUNTER--DISREGARD: Primary | ICD-10-CM

## 2020-06-07 NOTE — TELEPHONE ENCOUNTER
Clinic Action Needed:No/FYI only    Reason for Call: Marilee called nurse line to inquire why she wasn't getting a response from MyChart e-visit that she initiated earlier in the day.  She was told by another triage nurse to call back if she hadn't heard anything within the hour.    I advised caller that the E-visit she initiated with her PCP can only be addressed during clinic business hours when provider is able to see the [employer] message.    Gave caller options available over the weekend and she chose to do [facility].  If she is successfully treated via On[facility], she will not need to complete the e visit in [facility].  Thank you.    Routed to: BABAR CALLES3 AMOL MONK (RN) Pranay Vegas RN  [facility]

## 2020-06-07 NOTE — PROGRESS NOTES
"Date: 2020 12:16:00  Clinician: Melani Patino  Clinician NPI: [id number]  Patient: Marilee Weston  Patient : 1960  Patient Address: [address] [place] [address]  Patient Phone: [phone]  Visit Protocol: UTI  Patient Summary:  Marilee is a 60 year old ( : 1960 ) female who initiated a Visit for a presumed bladder infection. When asked the question \"Please sign me up to receive news, health information and promotions from [employer].\", Marilee responded \"No\".   Her symptoms started today and consist of dysuria and urgency.   Symptom details   Urine color: The color of her urine is yellow.    Denied symptoms include foul-smelling urine, nausea, feeling as if the bladder is never empty, flank pain, abdominal pain, urinary frequency, chills, vaginal discharge, urinary incontinence, vomiting, and vaginal itching. She does not feel feverish.   Marilee has not used any over-the-counter medications or home remedies to relieve her current symptoms.  Precipitating events  Marilee denies having a sexually transmitted disease.  Pertinent medical history  Marilee has had a bladder infection before but has not had any in the past 12 months. Her current symptoms are not similar to her previous bladder infection symptoms.   She is not sure what antibiotics have been effective in treating her past bladder infections.   Marilee has not been prescribed antibiotics to prevent frequent or repeated bladder infections in the past and does not get yeast infections when she takes antibiotics. She has not experienced problems or side effects with any of the common antibiotics used to treat bladder infections.   Marilee does not have a history of kidney stones. She has not used a catheter or been a patient in a hospital or nursing home in the past 2 weeks.   Marilee does not smoke or use smokeless tobacco.   Additional information as reported by the patient (free text): When I got up this morning there was blood in my " urine.  this has never happened before.  Since that first one this morning, they have been yellow and mostly clear.  there is paid in the area when I go.  its not severe.  I have to go often but I am drinking alot of water, and its not abnormal for me to have to go often.     MEDICATIONS: estradiol vaginal, duloxetine oral, Climara Pro transdermal, levothyroxine oral, metoprolol succinate oral, ALLERGIES: Penicillins  Clinician Response:  Deakayla Hunt,  Based on the information you have provided, you likely have an acute urinary tract infection, also called a bladder infection. Bladder infections occur when bacteria from the outside of the body enters the urinary tract. Any part of the urinary system can be infected, but the bladder is the most common.  Medication information  I am prescribing:     Nitrofurantoin monohyd/m-cryst (Macrobid) 100 mg oral capsule. Take 1 capsule by mouth every 12 hours for 5 days. Take this medication with food. There are no refills with this prescription.   The medication I prescribed for your bladder infection is an antibiotic. Continue taking the medication until it is gone even if you feel better.   Yeast infections can be a common side effect of antibiotics. The most common symptom of a yeast infection is itchiness in and around the vagina. Other signs and symptoms include burning, redness, or a thick, white vaginal discharge that looks like cottage cheese and does not have a bad smell.  Self care  Urination helps to flush bacteria from the urinary tract. For this reason, drinking water and urinating often helps relieve some urinary symptoms and can decrease your risk of getting bladder infections in the future.  Other steps you can take to prevent future bladder infections include:     Wipe front to back after using the bathroom    Urinate after sexual intercourse    Avoid using deodorant sprays, douches, or powders in the vaginal area     When to seek care  Please make an  appointment to be seen in a clinic or urgent care if any of the following occur:     You develop new symptoms or your symptoms become worse    You have medication side effects that make it difficult to take them as prescribed    Your symptoms do not improve within 1-2 days of starting treatment    You have symptoms of a bladder infection that return shortly after completing treatment     It is possible to have an allergic reaction to an antibiotic even if you have not had one in the past. If you notice a new rash, significant swelling, or difficulty breathing, stop taking this medication immediately and go to a clinic or urgent care.   Diagnosis: Acute uncomplicated bladder infection  Diagnosis ICD: N39.0  Prescription: nitrofurantoin monohyd/m-cryst (Macrobid) 100 mg oral capsule 10 capsule, 5 days supply. Take 1 capsule by mouth every 12 hours for 5 days. Refills: 0, Refill as needed: no, Allow substitutions: yes  Pharmacy: [facility] #[id number] - [phone] - [address] [facility] [place] [address]

## 2020-06-07 NOTE — TELEPHONE ENCOUNTER
Patient states she did an E-Visit earlier this morning and hasn't received any information other than summary is in chart.  [facility] informed it appears that provider hasn't completed visit and to call back if no response in 1 hour.

## 2020-06-08 NOTE — TELEPHONE ENCOUNTER
Please let pt know we have cancelled her e-visit because it looks like she did an oncare. Pls let her know we only answer e-visits on the weekdays.    Does she want to establish with our clinic? Can't do e-visits unless established with [facility] provider. Doesn't look like she has. Does she have a PCP?

## 2020-06-08 NOTE — TELEPHONE ENCOUNTER
"Sent [employer] message.    Appears the pt did a virtual visit and has been treated for symptoms.      - Alessandro \"Ervin\" YULIANA Villanueva - Patient Advocate Liason (PAL)  [facility]    "

## 2020-07-16 DIAGNOSIS — I48.0 PAROXYSMAL ATRIAL FIBRILLATION (H): ICD-10-CM

## 2020-07-16 RX ORDER — METOPROLOL SUCCINATE 50 MG/1
75 TABLET, EXTENDED RELEASE ORAL DAILY
Qty: 135 TABLET | Refills: 3 | Status: SHIPPED | OUTPATIENT
Start: 2020-07-16 | End: 2021-08-17

## 2020-07-16 NOTE — TELEPHONE ENCOUNTER
Pending Prescriptions:                       Disp   Refills    metoprolol succinate ER (TOPROL XL) 50 MG*135 ta*3            Sig: Take 1.5 tablets (75 mg) by mouth daily    Last Visit 2/3/2020 w/Vero  Last Filled 4/19/2020  Quantity 135  Req. Received 7/16/2020  YOLANDA Chen

## 2020-09-20 ENCOUNTER — COMMUNICATION - HEALTHEAST (OUTPATIENT)
Dept: PHYSICAL MEDICINE AND REHAB | Facility: CLINIC | Age: 60
End: 2020-09-20

## 2020-09-20 DIAGNOSIS — M43.16 SPONDYLOLISTHESIS OF LUMBAR REGION: ICD-10-CM

## 2020-09-20 DIAGNOSIS — M54.50 LUMBAR SPINE PAIN: ICD-10-CM

## 2020-09-20 DIAGNOSIS — M47.816 ARTHROPATHY OF LUMBAR FACET JOINT: ICD-10-CM

## 2020-11-04 ENCOUNTER — HOSPITAL ENCOUNTER (OUTPATIENT)
Dept: PHYSICAL MEDICINE AND REHAB | Facility: CLINIC | Age: 60
Discharge: HOME | End: 2020-11-04
Attending: NURSE PRACTITIONER

## 2020-11-04 DIAGNOSIS — M51.369 DDD (DEGENERATIVE DISC DISEASE), LUMBAR: ICD-10-CM

## 2020-11-04 DIAGNOSIS — M47.816 ARTHROPATHY OF LUMBAR FACET JOINT: ICD-10-CM

## 2020-11-04 DIAGNOSIS — M79.18 MYOFASCIAL PAIN: ICD-10-CM

## 2020-11-04 DIAGNOSIS — M43.16 SPONDYLOLISTHESIS OF LUMBAR REGION: ICD-10-CM

## 2020-11-04 DIAGNOSIS — M54.50 LUMBAR SPINE PAIN: ICD-10-CM

## 2020-11-04 ASSESSMENT — MIFFLIN-ST. JEOR: SCORE: 1306.58

## 2020-11-17 ENCOUNTER — NURSE TRIAGE (OUTPATIENT)
Dept: NURSING | Facility: CLINIC | Age: 60
End: 2020-11-17

## 2020-11-17 NOTE — TELEPHONE ENCOUNTER
Triage Call:    -Acid reflux sx started 1 month ago, patient has not been diagnosed with sx.   -Last night had a glass of wine with cheese and crackers, felt a lump in her throat when swallowing and was having slight regurgitation of what she was eating and drinking.   -Upper back pain started two weeks ago, middle, constant, mild. Patient states she twisted her back a little two weeks ago and is unsure if it is related to her upper middle back pain or acid reflux sx. Patient states that the pain does not radiate anywhere else.   -Patient states eating doesn't make it worse, but stating she has noticed after drinking wine and beer that she will get reflux sx.   -Has not tried any Tums over the counter.  -Patients  has a prescription for omeprazole 20mg, patient has been taking husbands prescription and sx have improved.  -Patient states she will feel acid go into her esophagus and will have a burnsing feeling.  - Patient does not see any Lump in throat, but still feels when swallowing that if feels like she has a small lump in her throat. Patient denies any sore throat or throat pain. Patient denies any difficulties with swallowing currently.     Patient denies abdominal pain, chest pain, nausea, SOB, fever, cough, numbness/tingling, loss of bowel or bladder control, weakness, confusion, blood in urine or stool, throat swelling, tongue swelling.     Patient at the end of conversation states that she is currently in [place] and will not be home until December, patient did not mention this until end of conversation. RN advised based on patients sx that she should be evaluated either by calling a clinic near her or going to an urgent care near her in [place]. Patient agreed to call today to be seen or go to urgent care today to have sx addressed and patient to be evaluated by a provider. RN advised for patient to call back with any new or worsening sx. Patient verbalized understanding and agrees with plan.      Lety Espino RN, BSN Nurse Triage Advisor 12:47 PM 11/17/2020       Additional Information    Negative: [1] Swollen tongue AND [2] sudden onset    Negative: Wheezing, stridor, hoarseness, or difficulty breathing    Negative: [1] Severe difficulty swallowing (e.g., drooling or spitting) AND [2] started suddenly after taking a medicine or allergic food    Negative: Sounds like a life-threatening emergency to the triager    Negative: SEVERE difficulty swallowing (e.g., drooling or spitting, can't swallow water)    Negative: [1] Symptoms of blocked esophagus (e.g., can't swallow normal secretions, drooling) AND [2] present now    Negative: Symptoms of food or bone stuck in throat or esophagus (e.g., pain in throat or chest, FB sensation, blood-tinged saliva)    Negative: [1] Drinking very little AND [2] dehydration suspected (e.g., no urine > 12 hours, very dry mouth, very lightheaded)    Negative: [1] Refuses to drink anything AND [2] for > 12 hours    Negative: Patient sounds very sick or weak to the triager    Negative: SEVERE symptoms of pill stuck in throat or esophagus (e.g., severe pain, bleeding, or inability to swallow liquids)    Negative: Fever > 100.5 F (38.1 C)    Negative: [1] Coughing spells AND [2] occur during eating/feedings or within 2 hours    Negative: [1] Symptoms of pill stuck in throat or esophagus (e.g., pain in throat or chest, FB sensation) AND [2] no relief after using CARE ADVICE    Negative: Weak immune system (e.g., HIV positive, cancer chemo, splenectomy, organ transplant, chronic steroids)    Negative: [1] Swallowing difficulty AND [2] cause unknown (Exception: difficulty swallowing is a chronic symptom)    Negative: Difficulty swallowing is a chronic symptom (recurrent or ongoing AND present > 4 weeks)    Negative: Preventing pills getting stuck in esophagus, questions about    Negative: Pill stuck in throat or esophagus    Negative: Passed out (i.e., fainted, collapsed and  was not responding)    Negative: Shock suspected (e.g., cold/pale/clammy skin, too weak to stand, low BP, rapid pulse)    Negative: Sounds like a life-threatening emergency to the triager    Negative: SEVERE back pain of sudden onset and age > 60    Negative: SEVERE abdominal pain (e.g., excruciating)    Negative: Abdominal pain and age > 60    Negative: Unable to urinate (or only a few drops) and bladder feels very full    Negative: Loss of bladder or bowel control (urine or bowel incontinence; wetting self, leaking stool) of new onset    Negative: Numbness (loss of sensation) in groin or rectal area    Negative: Pain radiates into groin, scrotum    Negative: Blood in urine (red, pink, or tea-colored)    Negative: Vomiting and pain over lower ribs of back (i.e., flank - kidney area)    Negative: Weakness of a leg or foot (e.g., unable to bear weight, dragging foot)    Negative: Patient sounds very sick or weak to the triager    Negative: Fever > 100.5 F (38.1 C) and flank pain    Negative: Pain or burning with passing urine (urination)    Negative: SEVERE back pain (e.g., excruciating, unable to do any normal activities) and not improved after pain medicine and CARE ADVICE    Negative: Numbness in an arm or hand (i.e., loss of sensation) and upper back pain    Negative: Numbness in a leg or foot (i.e., loss of sensation)    Negative: High-risk adult (e.g., history of cancer, history of HIV, or history of IV drug abuse)    Negative: Painful rash with multiple small blisters grouped together (i.e., dermatomal distribution or 'band' or 'stripe')    Negative: Pain radiates into the thigh or further down the leg, and in both legs    Negative: Age > 50 and no history of prior similar back pain    Negative: MODERATE back pain (e.g., interferes with normal activities) and present > 3 days    Negative: Pain radiates into the thigh or further down the leg    Patient wants to be seen    Negative: Mouth ulcers are seen     Negative: Sore throat (throat pain with swallowing)    Negative: Swallowed a (non-edible) foreign body    Negative: Swelling of tongue    Negative: Feeding tube, questions or concerns related to    Protocols used: SWALLOWING DIFFICULTY-A-AH, BACK PAIN-A-OH  COVID 19 Nurse Triage Plan/Patient Instructions    Please be aware that novel coronavirus (COVID-19) may be circulating in the community. If you develop symptoms such as fever, cough, or SOB or if you have concerns about the presence of another infection including coronavirus (COVID-19), please contact your health care provider or visit [email or web address].     Disposition/Instructions    In-Person Visit with provider recommended. Reference Visit Selection Guide.    Thank you for taking steps to prevent the spread of this virus.  o Limit your contact with others.  o Wear a simple mask to cover your cough.  o Wash your hands well and often.    Resources    [facility]: About COVID-19: [email or web address]    CDC: What to Do If You're Sick: www.cdc.gov/coronavirus/2019-ncov/about/steps-when-sick.html    CDC: Ending Home Isolation: www.cdc.gov/coronavirus/2019-ncov/hcp/disposition-in-home-patients.html     CDC: Caring for Someone: www.cdc.gov/coronavirus/2019-ncov/if-you-are-sick/care-for-someone.html     [facility]: Interim Guidance for Hospital Discharge to Home: www.health.[facility].mn.us/diseases/coronavirus/hcp/hospdischarge.pdf    [facility] clinical trials (COVID-19 research studies): [email or web address].[email or web address]/umn-[email or web address]-trials     Below are the COVID-19 hotlines at the [place] Department of Health ([facility]). Interpreters are available.   o For health questions: Call 240-737-6328 or 1-917.578.9236 (7 a.m. to 7 p.m.)  o For questions about schools and childcare: Call 856-098-5487 or 1-481.339.1995 (7 a.m. to 7 p.m.)

## 2020-12-14 ENCOUNTER — HEALTH MAINTENANCE LETTER (OUTPATIENT)
Age: 60
End: 2020-12-14

## 2020-12-17 ENCOUNTER — COMMUNICATION - HEALTHEAST (OUTPATIENT)
Dept: PHYSICAL MEDICINE AND REHAB | Facility: CLINIC | Age: 60
End: 2020-12-17

## 2020-12-18 ENCOUNTER — HOSPITAL ENCOUNTER (OUTPATIENT)
Dept: PHYSICAL MEDICINE AND REHAB | Facility: CLINIC | Age: 60
Discharge: HOME | End: 2020-12-18
Attending: PHYSICAL MEDICINE & REHABILITATION

## 2020-12-18 DIAGNOSIS — M79.18 MYOFASCIAL PAIN: ICD-10-CM

## 2020-12-18 DIAGNOSIS — M47.816 ARTHROPATHY OF LUMBAR FACET JOINT: ICD-10-CM

## 2020-12-18 DIAGNOSIS — M51.369 DDD (DEGENERATIVE DISC DISEASE), LUMBAR: ICD-10-CM

## 2020-12-18 DIAGNOSIS — M54.16 LUMBAR RADICULAR PAIN: ICD-10-CM

## 2020-12-18 DIAGNOSIS — M43.16 SPONDYLOLISTHESIS OF LUMBAR REGION: ICD-10-CM

## 2020-12-18 DIAGNOSIS — M54.50 LUMBAR SPINE PAIN: ICD-10-CM

## 2020-12-28 ENCOUNTER — COMMUNICATION - HEALTHEAST (OUTPATIENT)
Dept: PHYSICAL MEDICINE AND REHAB | Facility: CLINIC | Age: 60
End: 2020-12-28

## 2020-12-28 ENCOUNTER — HOSPITAL ENCOUNTER (OUTPATIENT)
Dept: MRI IMAGING | Facility: CLINIC | Age: 60
Discharge: HOME | End: 2020-12-28
Attending: PHYSICAL MEDICINE & REHABILITATION

## 2020-12-28 DIAGNOSIS — M47.816 ARTHROPATHY OF LUMBAR FACET JOINT: ICD-10-CM

## 2020-12-28 DIAGNOSIS — M43.16 SPONDYLOLISTHESIS OF LUMBAR REGION: ICD-10-CM

## 2020-12-28 DIAGNOSIS — M54.16 LUMBAR RADICULAR PAIN: ICD-10-CM

## 2020-12-28 DIAGNOSIS — M51.369 DDD (DEGENERATIVE DISC DISEASE), LUMBAR: ICD-10-CM

## 2021-01-06 ENCOUNTER — COMMUNICATION - HEALTHEAST (OUTPATIENT)
Dept: PHYSICAL MEDICINE AND REHAB | Facility: CLINIC | Age: 61
End: 2021-01-06

## 2021-01-08 ENCOUNTER — HOSPITAL ENCOUNTER (OUTPATIENT)
Dept: PHYSICAL MEDICINE AND REHAB | Facility: CLINIC | Age: 61
Discharge: HOME | End: 2021-01-08
Attending: PHYSICAL MEDICINE & REHABILITATION

## 2021-01-08 DIAGNOSIS — M54.16 LUMBAR RADICULAR PAIN: ICD-10-CM

## 2021-01-22 ENCOUNTER — COMMUNICATION - HEALTHEAST (OUTPATIENT)
Dept: PHYSICAL MEDICINE AND REHAB | Facility: CLINIC | Age: 61
End: 2021-01-22

## 2021-01-22 ENCOUNTER — AMBULATORY - HEALTHEAST (OUTPATIENT)
Dept: PHYSICAL MEDICINE AND REHAB | Facility: CLINIC | Age: 61
End: 2021-01-22

## 2021-01-22 DIAGNOSIS — M51.369 DDD (DEGENERATIVE DISC DISEASE), LUMBAR: ICD-10-CM

## 2021-01-22 DIAGNOSIS — M54.16 LUMBAR RADICULAR PAIN: ICD-10-CM

## 2021-01-22 DIAGNOSIS — M43.16 SPONDYLOLISTHESIS OF LUMBAR REGION: ICD-10-CM

## 2021-01-22 DIAGNOSIS — M47.816 ARTHROPATHY OF LUMBAR FACET JOINT: ICD-10-CM

## 2021-01-22 DIAGNOSIS — M53.3 SACROILIAC JOINT PAIN: ICD-10-CM

## 2021-01-22 DIAGNOSIS — M79.18 MYOFASCIAL PAIN: ICD-10-CM

## 2021-02-03 ENCOUNTER — COMMUNICATION - HEALTHEAST (OUTPATIENT)
Dept: PHYSICAL MEDICINE AND REHAB | Facility: CLINIC | Age: 61
End: 2021-02-03

## 2021-02-03 DIAGNOSIS — M47.816 ARTHROPATHY OF LUMBAR FACET JOINT: ICD-10-CM

## 2021-03-18 ENCOUNTER — COMMUNICATION - HEALTHEAST (OUTPATIENT)
Dept: PHYSICAL MEDICINE AND REHAB | Facility: CLINIC | Age: 61
End: 2021-03-18

## 2021-03-18 DIAGNOSIS — M43.16 SPONDYLOLISTHESIS OF LUMBAR REGION: ICD-10-CM

## 2021-03-18 DIAGNOSIS — M51.369 DDD (DEGENERATIVE DISC DISEASE), LUMBAR: ICD-10-CM

## 2021-03-20 ENCOUNTER — COMMUNICATION - HEALTHEAST (OUTPATIENT)
Dept: PHYSICAL MEDICINE AND REHAB | Facility: CLINIC | Age: 61
End: 2021-03-20

## 2021-03-20 DIAGNOSIS — M43.16 SPONDYLOLISTHESIS OF LUMBAR REGION: ICD-10-CM

## 2021-03-22 ENCOUNTER — COMMUNICATION - HEALTHEAST (OUTPATIENT)
Dept: PHYSICAL MEDICINE AND REHAB | Facility: CLINIC | Age: 61
End: 2021-03-22

## 2021-04-17 ENCOUNTER — HEALTH MAINTENANCE LETTER (OUTPATIENT)
Age: 61
End: 2021-04-17

## 2021-04-19 ENCOUNTER — COMMUNICATION - HEALTHEAST (OUTPATIENT)
Dept: PHYSICAL MEDICINE AND REHAB | Facility: CLINIC | Age: 61
End: 2021-04-19

## 2021-04-19 DIAGNOSIS — M47.816 ARTHROPATHY OF LUMBAR FACET JOINT: ICD-10-CM

## 2021-04-19 DIAGNOSIS — I48.0 PAROXYSMAL ATRIAL FIBRILLATION (H): ICD-10-CM

## 2021-04-21 RX ORDER — METOPROLOL SUCCINATE 50 MG/1
TABLET, EXTENDED RELEASE ORAL
Qty: 135 TABLET | Refills: 3 | OUTPATIENT
Start: 2021-04-21

## 2021-05-25 ENCOUNTER — RECORDS - HEALTHEAST (OUTPATIENT)
Dept: ADMINISTRATIVE | Facility: CLINIC | Age: 61
End: 2021-05-25

## 2021-05-26 ENCOUNTER — RECORDS - HEALTHEAST (OUTPATIENT)
Dept: ADMINISTRATIVE | Facility: CLINIC | Age: 61
End: 2021-05-26

## 2021-05-27 ENCOUNTER — RECORDS - HEALTHEAST (OUTPATIENT)
Dept: ADMINISTRATIVE | Facility: CLINIC | Age: 61
End: 2021-05-27

## 2021-05-28 ENCOUNTER — RECORDS - HEALTHEAST (OUTPATIENT)
Dept: ADMINISTRATIVE | Facility: CLINIC | Age: 61
End: 2021-05-28

## 2021-05-28 NOTE — PROGRESS NOTES
Office Visit - New Patient   Marilee Weston   58 y.o.  female    Date of visit: 4/24/2019  Physician: Chang Vora MD     Assessment and Plan   1. Closed fracture of multiple ribs of right side, initial encounter  Discussed at length.  I think it is unlikely at this point that she is going to have further healing of the sixth rib fracture.  Fortunately she is not having symptoms spare one incidence.  If she does continue to have pain with certain activities we can evaluate other treatment options.    2. Scapular dyskinesis  She is followed with orthopedic surgery and doing specialized physical therapy    3. Pain of foot, unspecified laterality  She does not want further evaluation at this time    4. Screen for colon cancer    5. Paroxysmal atrial fibrillation (H)  Paroxysmal, currently has an event monitor on and will obtain a cardiac MRI.  Currently her chads vascular 2 score is 1 and she is not on anticoagulation.  We discussed that as she approaches age 63 her risk will increase in anticoagulation will be recommended.  She is concerned about anticoagulation with her activities especially horseback riding and certainly other treatment options such as left atrial occlusion device could be considered depending on indications in the next 5 years.    6. Postsurgical hypothyroidism  Continue with levothyroxine    7. Osteopenia, unspecified location  We discussed this at length.  I do not recommend further treatment apart from weightbearing exercise and adequate calcium and vitamin D through the diet.  She should have a repeat DEXA scan next year.  Offered endocrinology referral and she declined.    8. Atrophic vaginitis  Continue with estrogen as prescribed    9. Menopausal syndrome (hot flashes)  Continue with estrogen, Dr. Chang Castorena managing    Return in about 3 months (around 7/24/2019) for recheck.     Chief Complaint   Chief Complaint   Patient presents with     Establish Care        Patient Profile    Social History     Social History Narrative    She and her , Garth ([place]).  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          Past Medical History   Patient Active Problem List   Diagnosis     Multiple fractures of ribs of right side     Fall     Paroxysmal atrial fibrillation (H)     Postsurgical hypothyroidism     Osteopenia, unspecified location       Past Surgical History  She has a past surgical history that includes Breast biopsy (Right); Thyroidectomy (1985); Pilonidal cyst / sinus excision (1979); Vein ligation and stripping (Right, 1975); and Vein Surgery (2015).     History of Present Illness   This 58 y.o. old woman comes in to establish care.  Her medical history is reviewed, electronic medical record was updated to reflect this note.  She is generally quite healthy.  She had an unfortunate accident last year when she fell off a horse.  She broke 5 ribs on the right and broke her scapula.  She has had a lot of chronic pain issues since then.  That she met with Dr. Dez Castelan, orthopedic shoulder specialist and he diagnosed her with scapular dyskinesia.  She is doing some physical therapy for this.  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  She has not had any ongoing pain since this specific incident which occurred when she turned in bed.  She has history of fracture of metatarsal bones in her foot.  She has occasional pain.  She has a history of osteopenia and was treated with bisphosphonate for 5 years and now is on a drug holiday.  Recent DEXA scan showed improvement and generally low fracture risk.  She currently is on estrogen and gets plenty of weightbearing exercise.  She has underlying atrial fibrillation which is paroxysmal and quite symptomatic.  She is reluctant to take rhythm control medication and she does not want to have an ablation.  Her stroke risk is 0.6 %/year and she is not on  anticoagulation.  She has had some atrophic vaginitis for which she uses topical estrogen and hormonal hot flashes for which she uses a patch which is effective.  She follows with Dr. Chang Castorena.    Review of Systems: A comprehensive review of systems was negative except as noted.     Medications and Allergies   Current Outpatient Medications   Medication Sig Dispense Refill     acetaminophen (TYLENOL) 500 MG tablet Take 1-2 tablets (500-1,000 mg total) by mouth every 6 (six) hours as needed for pain.  0     calcium citrate/vitamin D3 (CITRACAL + D ORAL) Take by mouth.       celecoxib (CELEBREX) 200 MG capsule Take 200 mg by mouth daily.       cyclobenzaprine (FLEXERIL) 5 MG tablet Take 1 tablet (5 mg total) by mouth 3 (three) times a day as needed for muscle spasms. 30 tablet 0     estradiol (ESTRACE) 0.01 % (0.1 mg/gram) vaginal cream Insert 2 g into the vagina 2 (two) times a week.       estradiol (VAGIFEM) 10 mcg Tab Insert 10 mcg into the vagina 2 (two) times a week.       estradiol-levonorgestrel (CLIMARAPRO) 0.045-0.015 mg/24 hr Place 1 patch on the skin once a week.       FLAXSEED ORAL Take 1 tablet by mouth daily.        levothyroxine (SYNTHROID, LEVOTHROID) 100 MCG tablet Take 100 mcg by mouth daily.       metoprolol (TOPROL-XL) 50 MG 24 hr tablet Take 50 mg by mouth daily.       OMEGA-3S-DHA-EPA-FISH OIL-D3 ORAL Take 2,400 Units by mouth.       polyethylene glycol (MIRALAX) 17 gram packet Take 17 g by mouth daily.       No current facility-administered medications for this visit.      Allergies   Allergen Reactions     Penicillins Rash     She was on cough syrup at the same time and suspects rash was actually due to cough syrup.        Family and Social History   Family History   Problem Relation Age of Onset     Cancer Father 64        multiple myeloma     Colon cancer Paternal Aunt 65     Colon cancer Cousin 60     Rheum arthritis Daughter      No Medical Problems Daughter      Mental illness Son      "    adopted     Drug abuse Son         Social History     Tobacco Use     Smoking status: Never Smoker     Smokeless tobacco: Never Used   Substance Use Topics     Alcohol use: Yes     Alcohol/week: 4.2 oz     Types: 7 Glasses of wine per week     Drug use: No        Physical Exam   General Appearance:   No acute distress    /60 (Patient Site: Left Arm, Patient Position: Sitting, Cuff Size: Adult Regular)   Pulse 69   Ht 5' 8\" (1.727 m)   Wt 164 lb (74.4 kg)   SpO2 98%   BMI 24.94 kg/m      EYES: Eyelids, conjunctiva, and sclera were normal. Pupils were normal. Cornea, iris, and lens were normal bilaterally.  HEAD, EARS, NOSE, MOUTH, AND THROAT: Head and face were normal. Hearing was normal to voice and the ears were normal to external exam. Nose appearance was normal and there was no discharge. Oropharynx was normal.  NECK: Neck appearance was normal. There were no neck masses and the thyroid was not enlarged.  RESPIRATORY: Breathing pattern was normal and the chest moved symmetrically.  Percussion/auscultatory percussion was normal.  Lung sounds were normal and there were no abnormal sounds.  CARDIOVASCULAR: Heart rate and rhythm were normal.  S1 and S2 were normal and there were no extra sounds or murmurs. Peripheral pulses in arms and legs were normal.  Jugular venous pressure was normal.  There was no peripheral edema.  GASTROINTESTINAL: The abdomen was normal in contour.  Bowel sounds were present.  Percussion detected no organ enlargement or tenderness.  Palpation detected no tenderness, mass, or enlarged organs.   MUSCULOSKELETAL: Skeletal configuration was normal and muscle mass was normal for age. Joint appearance was overall normal.  LYMPHATIC: There were no enlarged nodes.  SKIN/HAIR/NAILS: Skin color was normal.  There were no skin lesions.  Hair and nails were normal.  NEUROLOGIC: The patient was alert and oriented to person, place, time, and circumstance. Speech was normal. Cranial nerves " were normal. Motor strength was normal for age. The patient was normally coordinated.  PSYCHIATRIC:  Mood and affect were normal and the patient had normal recent and remote memory. The patient's judgment and insight were normal.       Additional Information   Review and/or order of clinical lab tests:  Review and/or order of radiology tests:  Review and/or order of medicine tests:  Discussion of test results with performing physician:  Decision to obtain old records and/or obtain history from someone other than the patient:  Review and summarization of old records and/or obtaining history from someone other than the patient and.or discussion of case with another health care provider:  Independent visualization of image, tracing or specimen itself:    Time: total time spent with the patient was 60 minutes of which >50% was spent in counseling and coordination of care     Chang Vora MD  Internal Medicine  Contact me at [phone]

## 2021-05-29 ENCOUNTER — RECORDS - HEALTHEAST (OUTPATIENT)
Dept: ADMINISTRATIVE | Facility: CLINIC | Age: 61
End: 2021-05-29

## 2021-05-29 NOTE — PROGRESS NOTES
Preoperative Consultation   Marilee Weston   59 y.o.  female    Date of visit: 5/22/2019  Physician: Chang Vora MD    This is a preoperative consultation requested by Dr. Sean Oakes in preparation for right cataract surgery on 5/28/2019 at [facility], fax [phone].       Assessment and Plan   Marilee Weston was seen in preoperative consultation in preparation for right cataract surgery.  This is a low risk surgery and the patient has no increased risk for major cardiac complications.  No contraindication to proposed surgery.  She will continue metoprolol and other medications on the day of surgery.    1. Preoperative examination    2. Cataract of both eyes, unspecified cataract type    3. Paroxysmal atrial fibrillation (H)    4. 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          Past Medical History   Patient Active Problem List   Diagnosis     Multiple fractures of ribs of right side     Paroxysmal atrial fibrillation (H)     Postsurgical hypothyroidism     Osteopenia, unspecified location       Past Surgical History  She has a past surgical history that includes Breast biopsy (Right); Thyroidectomy (1985); Pilonidal cyst / sinus excision (1979); Vein ligation and stripping (Right, 1975); and Vein Surgery (2015).     History of Present Illness   This 59 y.o. old woman comes in for preoperative evaluation.  She has cataracts which are affecting her vision especially driving at night.  She is going to have right cataract surgery.  She is otherwise feeling well.  She has underlying atrial fibrillation and is rate controlled.  She had a event monitor which were reviewed, less than 1% A. fib burden.  She has a low stroke risk based on her chads vASC score and is not on anticoagulation.  She has no known sleep apnea.  She has no  current chest pain or shortness of breath.  No infectious symptoms.    Recent antiplatelet use: yes Celecoxib  Personal or family history of bleeding or clotting disorders: no  Steroid use in the past year: no  Personal or family history of difficulty with anesthesia: no  Current cardiopulmonary symptoms: no    Review of Systems: A comprehensive review of systems was negative except as noted.     Medications and Allergies   Current Outpatient Medications   Medication Sig Dispense Refill     acetaminophen (TYLENOL) 500 MG tablet Take 1-2 tablets (500-1,000 mg total) by mouth every 6 (six) hours as needed for pain.  0     calcium citrate/vitamin D3 (CITRACAL + D ORAL) Take by mouth.       celecoxib (CELEBREX) 200 MG capsule Take 200 mg by mouth daily.       estradiol (ESTRACE) 0.01 % (0.1 mg/gram) vaginal cream Insert 2 g into the vagina 2 (two) times a week.       estradiol (VAGIFEM) 10 mcg Tab Insert 10 mcg into the vagina 2 (two) times a week.       estradiol-levonorgestrel (CLIMARAPRO) 0.045-0.015 mg/24 hr Place 1 patch on the skin once a week.       FLAXSEED ORAL Take 1 tablet by mouth daily.        levothyroxine (SYNTHROID, LEVOTHROID) 100 MCG tablet Take 100 mcg by mouth daily.       metoprolol succinate (TOPROL-XL) 50 MG 24 hr tablet Take 1 tablet (50 mg total) by mouth daily. 90 tablet 3     OMEGA-3S-DHA-EPA-FISH OIL-D3 ORAL Take 2,400 Units by mouth.       polyethylene glycol (MIRALAX) 17 gram packet Take 17 g by mouth daily.       No current facility-administered medications for this visit.      Allergies   Allergen Reactions     Penicillins Rash     She was on cough syrup at the same time and suspects rash was actually due to cough syrup.        Family and Social History   Family History   Problem Relation Age of Onset     Cancer Father 64        multiple myeloma     Colon cancer Paternal Aunt 65     Colon cancer Cousin 60     Rheum arthritis Daughter      No Medical Problems Daughter      Mental illness  "Son         adopted     Drug abuse Son         Social History     Tobacco Use     Smoking status: Never Smoker     Smokeless tobacco: Never Used   Substance Use Topics     Alcohol use: Yes     Alcohol/week: 4.2 oz     Types: 7 Glasses of wine per week     Drug use: No        Physical Exam   General Appearance:   No acute distress    /68 (Patient Site: Left Arm, Patient Position: Sitting, Cuff Size: Adult Regular)   Pulse 60   Ht 5' 8\" (1.727 m)   Wt 164 lb (74.4 kg)   SpO2 99%   BMI 24.94 kg/m      EYES: Eyelids, conjunctiva, and sclera were normal. Pupils were normal.  HEAD, EARS, NOSE, MOUTH, AND THROAT: Head and face were normal. Hearing was normal to voice and the ears were normal to external exam. Nose appearance was normal and there was no discharge. Oropharynx was normal.  NECK: Neck appearance was normal. There were no neck masses and the thyroid was not enlarged.  RESPIRATORY: Breathing pattern was normal and the chest moved symmetrically.  Percussion/auscultatory percussion was normal.  Lung sounds were normal and there were no abnormal sounds.  CARDIOVASCULAR: Heart rate and rhythm were normal.  S1 and S2 were normal and there were no extra sounds or murmurs. Peripheral pulses in arms and legs were normal.  Jugular venous pressure was normal.  There was no peripheral edema.  GASTROINTESTINAL: The abdomen was normal in contour.  Bowel sounds were present.  Percussion detected no organ enlargement or tenderness.  Palpation detected no tenderness, mass, or enlarged organs.   MUSCULOSKELETAL: Skeletal configuration was normal and muscle mass was normal for age. Joint appearance was overall normal.  LYMPHATIC: There were no enlarged nodes.  SKIN/HAIR/NAILS: Skin color was normal.  There were no skin lesions.  Hair and nails were normal.  NEUROLOGIC: The patient was alert and oriented to person, place, time, and circumstance. Speech was normal. Cranial nerves were normal. Motor strength was normal " for age. The patient was normally coordinated.  PSYCHIATRIC:  Mood and affect were normal and the patient had normal recent and remote memory. The patient's judgment and insight were normal.       Additional Tests   Laboratory: Recent labs in April showed normal thyroid function, normal cholesterol, normal glucose    Radiology: We reviewed her event monitor cardiac MRI, cardiac MRI looks okay    Total time spent with the patient today was 40 minutes of which > 50% was spent in counseling and coordination of care     Chang Vora MD  Internal Medicine  Contact me at [phone]

## 2021-05-30 ENCOUNTER — RECORDS - HEALTHEAST (OUTPATIENT)
Dept: ADMINISTRATIVE | Facility: CLINIC | Age: 61
End: 2021-05-30

## 2021-05-31 ENCOUNTER — RECORDS - HEALTHEAST (OUTPATIENT)
Dept: ADMINISTRATIVE | Facility: CLINIC | Age: 61
End: 2021-05-31

## 2021-06-01 NOTE — TELEPHONE ENCOUNTER
Medication Request  Medication name: celecoxib (CELEBREX) 200 MG capsule  Pharmacy Name and Location: [facility]  Reason for request: patient requesting a refill, medication is listed as  historical  When did you use medication last?:  yesterday  Patient offered appointment:  patient declined  Okay to leave a detailed message: yes

## 2021-06-01 NOTE — TELEPHONE ENCOUNTER
Narda Rodríguez to refill celecoxib 200 mg 1 capsule daily?    If so please advise on quantity and refills.    Erin VU LPN .......... 9:05 AM  09/06/19

## 2021-06-02 ENCOUNTER — RECORDS - HEALTHEAST (OUTPATIENT)
Dept: ADMINISTRATIVE | Facility: CLINIC | Age: 61
End: 2021-06-02

## 2021-06-02 NOTE — PROGRESS NOTES
Preoperative Consultation   Marilee Weston   59 y.o.  female    Date of visit: 10/25/2019  Physician: Chang Vora MD    This is a preoperative consultation requested by Dr. Parker in preparation for left cataract surgery on 11/12/19 at [facility], fax [phone]       Assessment and Plan   Marilee Weston was seen in preoperative consultation in preparation for left cataract surgery.  This is a low risk surgery and the patient has no increased risk for major cardiac complications.  No contraindication to proposed surgery.  She will continue metoprolol and other medications on the day of surgery.    1. Preoperative examination    2. Cataract of left eye, unspecified cataract type    3. Paroxysmal atrial fibrillation (H)    4. Postsurgical hypothyroidism    5. Osteoarthritis of lumbar spine, unspecified spinal osteoarthritis complication status         Patient Profile   Social History     Patient does not qualify to have social determinant information on file (likely too young).   Social History Narrative    She and her , Garth (Moshe).  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          Past Medical History   Patient Active Problem List   Diagnosis     Paroxysmal atrial fibrillation (H)     Postsurgical hypothyroidism     Osteopenia, unspecified location       Past Surgical History  She has a past surgical history that includes Breast biopsy (Right); Thyroidectomy (1985); Pilonidal cyst / sinus excision (1979); Vein ligation and stripping (Right, 1975); and Vein Surgery (2015).     History of Present Illness   This 59 y.o. old woman comes in for preoperative evaluation.  She has cataracts which are affecting her vision especially driving at night.  She had a right cataract removed recently.  She is otherwise feeling well.  She has underlying atrial fibrillation and is rate controlled.  She had a event monitor which were reviewed, less than 1% A. fib burden.  She has a low  stroke risk based on her chadsvasc2 score and is not on anticoagulation.  She has no known sleep apnea.  She has no current chest pain or shortness of breath.  No infectious symptoms.    Recent antiplatelet use: yes Celecoxib  Personal or family history of bleeding or clotting disorders: no  Steroid use in the past year: no  Personal or family history of difficulty with anesthesia: no  Current cardiopulmonary symptoms: no    Review of Systems: A comprehensive review of systems was negative except as noted.     Medications and Allergies   Current Outpatient Medications   Medication Sig Dispense Refill     acetaminophen (TYLENOL) 500 MG tablet Take 1-2 tablets (500-1,000 mg total) by mouth every 6 (six) hours as needed for pain.  0     calcium citrate/vitamin D3 (CITRACAL + D ORAL) Take by mouth.       celecoxib (CELEBREX) 200 MG capsule Take 1 capsule (200 mg total) by mouth daily. 90 capsule 3     estradiol (ESTRACE) 0.01 % (0.1 mg/gram) vaginal cream Insert 2 g into the vagina 2 (two) times a week.       estradiol (VAGIFEM) 10 mcg Tab Insert 10 mcg into the vagina 2 (two) times a week.       estradiol-levonorgestrel (CLIMARAPRO) 0.045-0.015 mg/24 hr Place 1 patch on the skin once a week.       FLAXSEED ORAL Take 1 tablet by mouth daily.        levothyroxine (SYNTHROID, LEVOTHROID) 100 MCG tablet Take 100 mcg by mouth daily.       metoprolol succinate (TOPROL-XL) 50 MG 24 hr tablet Take 1.5 tablets (75 mg total) by mouth daily.       OMEGA-3S-DHA-EPA-FISH OIL-D3 ORAL Take 2,400 Units by mouth.       polyethylene glycol (MIRALAX) 17 gram packet Take 17 g by mouth daily.       No current facility-administered medications for this visit.      Allergies   Allergen Reactions     Penicillins Rash     She was on cough syrup at the same time and suspects rash was actually due to cough syrup.        Family and Social History   Family History   Problem Relation Age of Onset     Cancer Father 64        multiple myeloma      "Colon cancer Paternal Aunt 65     Colon cancer Cousin 60     Rheum arthritis Daughter      No Medical Problems Daughter      Mental illness Son         adopted     Drug abuse Son         Social History     Tobacco Use     Smoking status: Never Smoker     Smokeless tobacco: Never Used   Substance Use Topics     Alcohol use: Yes     Alcohol/week: 7.0 standard drinks     Types: 7 Glasses of wine per week     Drug use: No        Physical Exam   General Appearance:   No acute distress    /72 (Patient Site: Left Arm, Patient Position: Sitting, Cuff Size: Adult Regular)   Pulse (!) 57   Ht 5' 8\" (1.727 m)   Wt 164 lb (74.4 kg)   SpO2 98%   BMI 24.94 kg/m      EYES: Eyelids, conjunctiva, and sclera were normal. Pupils were normal.  HEAD, EARS, NOSE, MOUTH, AND THROAT: Head and face were normal. Hearing was normal to voice and the ears were normal to external exam. Nose appearance was normal and there was no discharge. Oropharynx was normal.  NECK: Neck appearance was normal. There were no neck masses and the thyroid was not enlarged.  RESPIRATORY: Breathing pattern was normal and the chest moved symmetrically.  Percussion/auscultatory percussion was normal.  Lung sounds were normal and there were no abnormal sounds.  CARDIOVASCULAR: Heart rate and rhythm were normal.  S1 and S2 were normal and there were no extra sounds or murmurs. Peripheral pulses in arms and legs were normal.  Jugular venous pressure was normal.  There was no peripheral edema.  GASTROINTESTINAL: The abdomen was normal in contour.  Bowel sounds were present.  Percussion detected no organ enlargement or tenderness.  Palpation detected no tenderness, mass, or enlarged organs.   MUSCULOSKELETAL: Skeletal configuration was normal and muscle mass was normal for age. Joint appearance was overall normal.  LYMPHATIC: There were no enlarged nodes.  SKIN/HAIR/NAILS: Skin color was normal.  There were no skin lesions.  Hair and nails were " normal.  NEUROLOGIC: The patient was alert and oriented to person, place, time, and circumstance. Speech was normal. Cranial nerves were normal. Motor strength was normal for age. The patient was normally coordinated.  PSYCHIATRIC:  Mood and affect were normal and the patient had normal recent and remote memory. The patient's judgment and insight were normal.       Additional Tests   Laboratory: Recent labs look okay, excellent    Reviewed her cardiology visit from 10/7/2019    EKG available in the [facility] system, I do not have this for review but reportedly normal sinus rhythm    Total time spent with the patient today was 40 minutes of which > 50% was spent in counseling and coordination of care regarding above     Chang Vora MD  Internal Medicine  Contact me at [phone]

## 2021-06-03 VITALS — HEIGHT: 68 IN | WEIGHT: 163 LBS | BODY MASS INDEX: 24.71 KG/M2

## 2021-06-03 VITALS
BODY MASS INDEX: 24.86 KG/M2 | WEIGHT: 164 LBS | HEIGHT: 68 IN | OXYGEN SATURATION: 98 % | DIASTOLIC BLOOD PRESSURE: 72 MMHG | SYSTOLIC BLOOD PRESSURE: 122 MMHG | HEART RATE: 57 BPM

## 2021-06-03 VITALS — BODY MASS INDEX: 24.86 KG/M2 | WEIGHT: 164 LBS | HEIGHT: 68 IN

## 2021-06-03 VITALS — WEIGHT: 164 LBS | BODY MASS INDEX: 24.86 KG/M2 | HEIGHT: 68 IN

## 2021-06-03 NOTE — PATIENT INSTRUCTIONS - HE
Pre-Injection Instruction and Education        Please be aware that you will need a  for your injection       If you are currently taking antibiotics please contact the [facility] Nurse Navigator at [phone] for specific instructions      Before your procedure please make [facility] aware if you are taking blood thinning (anticoagulant) medications for any reason. Depending on the procedure, these medications may need to be temporarily stopped in order to ensure a safe procedure. Wait for instructions BEFORE stopping your anticoagulation (blood thinning) medication   o Examples may include: Coumadin (Warfarin), Plavix (Clopidogrel), Effient (Prasugrel), Lovenox (Enoxaparin), Ticagrelor (Brilinta), Ciliostazol (Ticlid), Dipyridamole (Aggrenox) Dabigatran (pradaxa), Rivaroxaban (Xarelto), Apixaban (Eliquis), Edoxaban, or daily Aspirin       Please also make us aware if you have a history of a contrast dye allergy      You may drink and eat a light meal before having your procedure      You may take your usual medications, including pain medication  o EXCEPTION: if you are having a medial branch block (MBB) do NOT take pain medication      Flu shots should be given at least two weeks prior to or two weeks post steroid injections.  Steroids may decrease the effectiveness of a flu shot.      If you have questions please call the Spine Center at [phone]

## 2021-06-03 NOTE — TELEPHONE ENCOUNTER
Pt returned call. Provider's results and recommendations given. Pt stated understanding. Pt has not yet scheduled injection. She would like to schedule after 12/9/2019 at this time. Injection requirements reviewed. Pt had her flu shot on 11/15/2019. She had cataract surgery on 11/12/2019 and has been taking antibiotic eye drops prophylactically. Transferred pt to scheduling to make this appt.

## 2021-06-03 NOTE — TELEPHONE ENCOUNTER
----- Message from David Malagon DO sent at 11/12/2019  8:07 AM CST -----  X-rays lumbar spine reviewed.  3 mm slippage of L4 and L5 with no instability/movement with flexion and extension.  Continue current plan of care

## 2021-06-03 NOTE — PROGRESS NOTES
Assessment/Plan:      Diagnoses and all orders for this visit:    Lumbar spine pain  -     XR Lumbar Spine Flex and Ext 2 or 3 VWS; Future; Expected date: 11/11/2019  -     Ambulatory referral to Physical Therapy  -     OPS Paravertbral Facet Joint Inj Lumbar; Future; Expected date: 11/11/2019    Arthropathy of lumbar facet joint  -     XR Lumbar Spine Flex and Ext 2 or 3 VWS; Future; Expected date: 11/11/2019  -     Ambulatory referral to Physical Therapy  -     OPS Paravertbral Facet Joint Inj Lumbar; Future; Expected date: 11/11/2019    Spondylolisthesis of lumbar region  -     XR Lumbar Spine Flex and Ext 2 or 3 VWS; Future; Expected date: 11/11/2019  -     Ambulatory referral to Physical Therapy  -     OPS Paravertbral Facet Joint Inj Lumbar; Future; Expected date: 11/11/2019    DDD (degenerative disc disease), lumbar  -     Ambulatory referral to Physical Therapy        Assessment: Pleasant 59 y.o. female with a history of depression and thyroid disorder A. fib with:    1.  Chronic low back pain lumbosacral junction most consistent with facet arthropathy.  She has at least moderate facet arthropathy at L4-5 with a mild grade 1 spondylolisthesis is degenerative likely causing her pain.    2.  Degenerative disc disease lumbar spine.  Multiple levels.      Discussion:    1.  I discussed the diagnosis and treatment options.  She is quite distressed over the amount of pain she has had although it has improved some with physical therapy she has had extensive physical therapy over the past several years.  We discussed the options of injections along with further therapy, surgical referral, medication changes.    2.  May continue with her physical therapy at [facility].  Will prescribe a few more sessions to add to pelvic floor strengthening.    3.  She may continue Celebrex as needed did offer her duloxetine which may be helpful in the long run she wants to hold off for now.    4.  Flexion-extension x-rays lumbar spine to  evaluate for instability and see if there is been any change in the spondylolisthesis.    5.  Recommend facet injections L4-5 bilaterally with Dr. Marie.  Order written.    6.  Follow-up 2 to 4 weeks after injection.      It was our pleasure caring for your patient today, if there any questions or concerns please do not hesitate to contact us.      Subjective:   Patient ID: Marilee Weston is a 59 y.o. female.    History of Present Illness: Patient presents at the request of Dr. Vora for evaluation of low back pain.  She reports low back pain for over 10 years.  Really started to flare around 2017.  Was having fairly significant back pain at the time and tells me she [facility] and after 2 years of significant strengthening she is still having pain and although she continued for another year she did see [facility] in the interim recommended surgical intervention.  Is then had a second opinion from Dr. Sb Fragoso who recommended conservative management and she continued with therapy for another year.  In May 2018 however she was bucked off of a horse and fractured her scapula and was seeing Dr. Castelan at [facility] orth[facility]ics.  Was given some physical therapy for the shoulder but also they included the lumbar spine at that time and she has made some good gains at [facility] but still having pain.    Her pain is at the lumbosacral junction bilaterally left is worse than or equal to the right.  No radiation of the pain down the legs no paresthesias or weakness in the legs.  Back pain seems to be worse first thing in the morning or with prolonged standing or after horseback riding.  She rides dressage.  Symptoms seem to better with sitting and with laying in her adjustable bed with her legs elevated.  She has had some issue with tight pelvic floor in the past and some urinary leakage but nothing recently.      Imaging: MRI report and images were personally reviewed and  discussed with the patient.  A plastic model was utilized during the discussion.  MRI of the lumbar spine from 2017 [facility] personally reviewed.  Shows moderate facet arthropathy L4-5 with mild 3 mm spondylolisthesis.  This is degenerative.  Degenerative disc disease L3-4 L4-5 and L5-S1 with disc height loss.  There are very months of disc bulge 03-4 L2-3 centrally with no high-grade central stenosis at any level.  There are variable degrees of mild foraminal stenosis and potential compression of the left L4 nerve from a disc bulge at L3-4.  She reports flexion-extension x-ray showed some movement in the past.  No images or reports available.    Review of Systems: No fevers, headaches, ringing in the ears, change in vision, chest pain, shortness of breath, abdominal pain, nausea, vomiting, bowel or bladder continence currently, skin rashes or itching, poor balance sleeping issues Remainder of 12 point review systems negative unless listed above.    Past Medical History:   Diagnosis Date     Atrial fibrillation (H)      Depression      Fibrocystic breast      Osteopenia      Postsurgical hypothyroidism        The following portions of the patient's history were reviewed and updated as appropriate: allergies, current medications, past family history, past medical history, past social history, past surgical history and problem list.      WHO 5: 25    DAWN Score: 12      Objective:   Physical Exam:    Vitals:    11/11/19 1015   BP: 142/75   Pulse: (!) 59     Body mass index is 24.78 kg/m .      General:  Well-appearing female in no acute distress.  Pleasant, cooperative, and interactive throughout the examination and interview.  CV: No lower extremity edema on inspection or paltation.  Lymphatics: No cervical lymphadenopathy palpated. Eyes: sclera clear. Skin: No rashes or lesions seen over the head/neck, hairline, arms, legs, trunk.  Respirations unlabored.  MSK: Gait is normal.  Able to heel-toe walk without difficulty.   Negative Romberg.  Spine: normal AP curves of the C, T, and L spine.  Full range of motion in the Lumbar spine in all planes.  Palpation: Tenderness to palpation over lumbosacral junction bilaterally L5-S1 paraspinals L4-5.  Extremities: Full range of motion of the elbows, and wrists with no effusions or tenderness to palpation.   Full range of motion of the hips, knees, and ankles with no effusions or tenderness to palpation.  Negative scour maneuver and Haroldo's test bilaterally. No hypermobility of the upper or lower extremities.  Neurologic exam: Mental status: Patient is alert and oriented with normal affect.  Attention, knowledge, memory, and language are intact.  Normal coordination throughout the examination.  Reflexes are 2+ and symmetric biceps, triceps, brachioradialis, patellar, and Achilles with down-going toes and Negative Soham's.  Sensation is intact to light touch throughout the upper and lower extremities bilaterally.  Manual muscle testing reveals 5 out of 5 in the hip flexors, knee flexors/extensors, ankle plantar flexors, ankle  dorsiflexors, and EHL.  Upper extremities: Grossly normal strength . Normal muscle bulk and tone in the arms and legs.    Negative seated and supine straight leg raise bilaterally.

## 2021-06-04 VITALS
WEIGHT: 149.5 LBS | SYSTOLIC BLOOD PRESSURE: 128 MMHG | TEMPERATURE: 97.5 F | HEIGHT: 68 IN | RESPIRATION RATE: 12 BRPM | BODY MASS INDEX: 22.66 KG/M2 | HEART RATE: 53 BPM | DIASTOLIC BLOOD PRESSURE: 78 MMHG

## 2021-06-04 VITALS — BODY MASS INDEX: 24.48 KG/M2 | WEIGHT: 161 LBS

## 2021-06-04 VITALS
BODY MASS INDEX: 24.71 KG/M2 | OXYGEN SATURATION: 100 % | HEART RATE: 60 BPM | DIASTOLIC BLOOD PRESSURE: 86 MMHG | WEIGHT: 163 LBS | SYSTOLIC BLOOD PRESSURE: 132 MMHG | HEIGHT: 68 IN

## 2021-06-04 VITALS — WEIGHT: 162 LBS | HEIGHT: 68 IN | BODY MASS INDEX: 24.55 KG/M2

## 2021-06-04 NOTE — TELEPHONE ENCOUNTER
RN Assessment/Reason for Call:   Okay to leave Detailed Message  Marilee calling in, she was at eye Dr for cataract surgery a month ago.  Tingle feeling in the back of her head, and at the back of her eye.(hasnt had symptoms for 2 weeks).  Shingles may be coming on? Was told by eye .    RN Action/Disposition:  Protocol recommends see Dr today;clinic full.  Appt 12/12/19 3p [facility] PCP. Wants shingles shot also   Call back if worse symptoms  Discussed home care measures.  Offered flu immunization.  Agrees to plan.     Hollie Zarco RN    [facility] Triage/med refill  12/11/2019  10:22 AM      Reason for Disposition    Neurologic deficit that was brief (now gone), ANY of the following: * Weakness of the face, arm, or leg on one side of the body * Numbness of the face, arm, or leg on one side of the body * Loss of speech or garbled speech    Protocols used: NEUROLOGIC DEFICIT-A-OH

## 2021-06-04 NOTE — TELEPHONE ENCOUNTER
It sounds like she has not had symptoms for 2 weeks.  I think it is okay to come in on the 12th as scheduled.  She should check with her insurance to make sure though shingles shot is covered before the visit.

## 2021-06-04 NOTE — PROGRESS NOTES
Office Visit - Follow Up   Marilee Weston   59 y.o. female    Date of Visit: 12/12/2019    Chief Complaint   Patient presents with     Headache     Hospital Visit Follow Up     Flank Pain     left        Assessment and Plan   1. Nonintractable episodic headache, unspecified headache type  Her headache is improving and her exam is fairly unremarkable.  We will check a sed rate just to make sure were not dealing with something like temporal arteritis but otherwise reassurance provided but if symptoms persist beyond the next couple of weeks we may need to consider further evaluation.  Okay to use acetaminophen and ibuprofen for symptom control.  - Erythrocyte Sedimentation Rate    2. Immunization due  - Varicella Zoster, Recombinant Vaccine IM    Return in about 3 months (around 3/12/2020) for recheck.     History of Present Illness   This 59 y.o. old woman comes in for evaluation of tingling of her head and headache.  This came on about a month ago.  She had cataract surgery around the same time.  She noticed some tingling on the left side of her head and pain and achiness about the eye.  She told her ophthalmologist about this who felt she might have shingles.  Recommended she get evaluated for this and ultimately ended up in the emergency room yesterday and had a fairly unremarkable examination was discharged.  She is socially paid a little bit more attention and the pain can happen on both sides of her head but is mainly on the left side.  She has no other neurological issues.  She does not typically have headaches.  She does not have a history of migraines.  She has had no vision loss.  No double vision.  No jaw claudication.  No rash.    Review of Systems: A comprehensive review of systems was negative except as noted.     Medications, Allergies and Problem List   Reviewed, reconciled and updated  Post Discharge Medication Reconciliation Status:      Physical Exam   General Appearance:   No acute distress    BP  "132/86 (Patient Site: Right Arm, Patient Position: Sitting, Cuff Size: Adult Regular)   Pulse 60   Ht 5' 8\" (1.727 m)   Wt 163 lb (73.9 kg)   SpO2 100%   BMI 24.78 kg/m      HEENT exam is unremarkable  Neck supple no thyromegaly or nodule palpable  Lymphatic no cervical lymphadenopathy  Cardiovascular regular rate and rhythm no murmur gallop or rub  Pulmonary lungs are clear to auscultation bilaterally  Gastrointestinal abdomen soft nontender nondistended no organomegaly  Neurologic exam is non focal  Psychiatric pleasant, no confusion or agitation   No rash     Additional Information   Current Outpatient Medications   Medication Sig Dispense Refill     acetaminophen (TYLENOL) 500 MG tablet Take 1-2 tablets (500-1,000 mg total) by mouth every 6 (six) hours as needed for pain.  0     calcium citrate/vitamin D3 (CITRACAL + D ORAL) Take by mouth.       celecoxib (CELEBREX) 200 MG capsule Take 1 capsule (200 mg total) by mouth daily. 90 capsule 3     estradiol (ESTRACE) 0.01 % (0.1 mg/gram) vaginal cream Insert 2 g into the vagina 2 (two) times a week.       estradiol (VAGIFEM) 10 mcg Tab Insert 10 mcg into the vagina 2 (two) times a week.       estradiol-levonorgestrel (CLIMARAPRO) 0.045-0.015 mg/24 hr Place 1 patch on the skin once a week.       FLAXSEED ORAL Take 1 tablet by mouth daily.        levothyroxine (SYNTHROID, LEVOTHROID) 100 MCG tablet Take 100 mcg by mouth daily.       metoprolol succinate (TOPROL-XL) 50 MG 24 hr tablet Take 1.5 tablets (75 mg total) by mouth daily.       OMEGA-3S-DHA-EPA-FISH OIL-D3 ORAL Take 2,400 Units by mouth.       polyethylene glycol (MIRALAX) 17 gram packet Take 17 g by mouth daily.       No current facility-administered medications for this visit.      Allergies   Allergen Reactions     Penicillins Rash     She was on cough syrup at the same time and suspects rash was actually due to cough syrup.     Social History     Tobacco Use     Smoking status: Never Smoker     " Smokeless tobacco: Never Used   Substance Use Topics     Alcohol use: Yes     Alcohol/week: 7.0 standard drinks     Types: 7 Glasses of wine per week     Drug use: No       Review and/or order of clinical lab tests:  Review and/or order of radiology tests:  Review and/or order of medicine tests:  Discussion of test results with performing physician:  Decision to obtain old records and/or obtain history from someone other than the patient:  Review and summarization of old records and/or obtaining history from someone other than the patient and.or discussion of case with another health care provider:  Independent visualization of image, tracing or specimen itself:    Time:      Chang Vora MD

## 2021-06-04 NOTE — TELEPHONE ENCOUNTER
"Attempted to contact patient to discuss recent shingles vaccine. Patient is scheduled 12/16/2019 for a bilateral L4-5 facet joint injection with Dr Marie. Patient recently been having \"tingling in her head and headache\" and evaluated in ED. Followed up with PMD yesterday and received shingles vaccine. It is recommended that injections be scheduled two weeks after (or before) vaccines. Left message for patient to call back and discuss.  "

## 2021-06-04 NOTE — PATIENT INSTRUCTIONS - HE
DISCHARGE INSTRUCTIONS    During office hours (8:00 a.m.- 4:00 p.m.) questions or concerns may be answered  by calling [facility]ine Center Navigation Nurses at  [phone].  Messages received after hours will be returned the following business day.      In the case of an emergency, please dial 911 or seek assistance at the nearest Emergency Room/Urgent Care facility.     All Patients:    ? You may experience an increase in your symptoms for the first 2 days (It may take anywhere between 2 days- 2 weeks for the steroid to have maximum effect).    ? You may use ice on the injection site, as frequently as 20 minutes each hour if needed.    ? You may take your pain medicine.    ? You may continue taking your regular medication after your injection. If you have had a Medial Branch Block you may resume pain medication once your pain diary is completed.    ? You may shower. No swimming, tub bath or hot tub for 48 hours.  You may remove your bandaid/bandage as soon as you are home.    ? You may resume light activities, as tolerated.    ? Resume your usual diet as tolerated.    ? It is strongly advised that you do not drive for 1-3 hours post injection.    ? If you have had oral sedation:  Do not drive for 8 hours post injection.      ? If you have had IV sedation:  Do not drive for 24 hours post injection.  Do not operate hazardous machinery or make important personal/business decisions for 24 hours.      POSSIBLE STEROID SIDE EFFECTS (If steroid/cortisone was used for your procedure)    -If you experience these symptoms, it should only last for a short period      Swelling of the legs                Skin redness (flushing)       Mouth (oral) irritation     Blood sugar (glucose) levels              Sweats                      Mood changes    Headache    Sleeplessness         POSSIBLE PROCEDURE SIDE EFFECTS  -Call the Spine Center if you are concerned    Increased Pain             Increased numbness/tingling         Nausea/Vomiting            Bruising/bleeding at site        Redness or swelling                                                Difficulty walking        Weakness             Fever greater than 100.5    *In the event of a severe headache after an epidural steroid injection that is relieved by lying down, please call the [facility] to speak with a clinical staff member*

## 2021-06-04 NOTE — TELEPHONE ENCOUNTER
Called patient- she is currently at [facility]- she would still like to keep her appt for tomorrow.    Advised she call her insurance regarding the Shingrix vaccine

## 2021-06-05 VITALS — WEIGHT: 155 LBS | BODY MASS INDEX: 23.49 KG/M2 | HEIGHT: 68 IN

## 2021-06-05 NOTE — PROGRESS NOTES
Assessment/Plan:      Diagnoses and all orders for this visit:    Lumbar spine pain  -     DULoxetine (CYMBALTA) 20 MG capsule; Take 1 capsule (20 mg total) by mouth daily.  Dispense: 30 capsule; Refill: 2    Arthropathy of lumbar facet joint  -     DULoxetine (CYMBALTA) 20 MG capsule; Take 1 capsule (20 mg total) by mouth daily.  Dispense: 30 capsule; Refill: 2    Spondylolisthesis of lumbar region  -     DULoxetine (CYMBALTA) 20 MG capsule; Take 1 capsule (20 mg total) by mouth daily.  Dispense: 30 capsule; Refill: 2    Myofascial pain    DDD (degenerative disc disease), lumbar        Assessment: Pleasant 59 y.o. female ith a history of depression and thyroid disorder A. fib with:    1.  Persistent, slightly improved lumbar spine pain at the lumbosacral junction.  Most consistent with facet arthropathy and myofascial pain.  She is at least moderate facet arthropathy L4-5 with a grade 1 spondylolisthesis that is degenerative.  No instability from flexion to extension.  Very good results from lumbar facet injections temporarily still has a slight improvement.    2.  Degenerative disc disease multiple levels most significant L4-5.      Discussion:    1.  I do lengthy discussion today with the patient regarding her diagnosis and treatment options.  We discussed her response to facet injections and she has been working with physical therapy.  She has the spondylolisthesis L4-5 we discussed options of injections such as medial branch blocks along with medications monitoring symptoms or surgical referral.  She is not interested in surgery and wants to avoid further injections unless absolutely necessary.  She is not wanting another MRI at this time either.    2.  Trial Cymbalta for lumbar spine pain related to spondylosis.  She can take this at night but if it keeps her up she can then take this in the morning.    3.  Continue with her home exercises.    4.  Can consider medial branch blocks with potential radiofrequency  ablation or repeat facet injection if symptoms flare on her.    5.  She will follow-up with me in 4 to 6 weeks to evaluate the effect of Cymbalta.    It was our pleasure caring for your patient today, if there any questions or concerns please do not hesitate to contact us.    25 minutes were spent with this patient in addition to any procedure with greater than 50% in counseling and coordination of care.    Subjective:   Patient ID: Marilee Weston is a 59 y.o. female.    History of Present Illness: Patient presents for follow-up evaluation of lumbar spine pain after facet injections which were done nearly 1 month ago.  Injections were done at L4-5.  She had several hours of 100% relief and then the pain returned.  She feels there is 3 areas of pain right of the spine left of the spine and central spine over the S2 region.  Her pain is worse with any laying flat or standing.  Better with sitting and with her exercises.  Pain is an 8/10 at worst 4/10 today 1/10 at best.  She was riding her horse in [place] the last week although she does not sit down on the saddle but she does stand during horseback riding.  Overall she does have some improvement still with the facet injections but only up to 20% but her back does bother her constantly at least a 1/10.      Imaging: MRI report and images were personally reviewed and discussed with the patient.  A plastic model was utilized during the discussion.  MRI of the lumbar spine personally reviewed from [facility] 2017 showing degenerative disc disease fairly significant L4-5 with a central disc bulge 3 mm spondylolisthesis with moderate facet arthropathy no high-grade central stenosis.  There is a central disc bulge L5-S1 as well as left paracentral L3-4 herniation, with potential L4 compression    Flexion-extension x-rays show no instability of L4-5 on flexion to extension.    Review of Systems: Feels weakness in her back.  No numbness, tingling  .  No bowel or bladder  incontinence.  No urinary retention.  No fevers, unintentional weight loss, balance changes, headaches, frequent falling, difficulty swallowing, or coordination difficulties.      Prior interventions:  1.  Bilateral L4-5 facet injections helped significantly right away and only mild benefit after.    Past Medical History:   Diagnosis Date     Atrial fibrillation (H)      Depression      Fibrocystic breast      Osteopenia      Postsurgical hypothyroidism        The following portions of the patient's history were reviewed and updated as appropriate: allergies, current medications, past family history, past medical history, past social history, past surgical history and problem list.           Objective:   Physical Exam:    Vitals:    01/20/20 1249   BP: 131/73   Pulse: (!) 56     Body mass index is 24.63 kg/m .      General: Alert and oriented with normal affect. Attention, knowledge, memory, and language are intact. No acute distress.   Eyes: Sclerae are clear.  Respirations: Unlabored. CV: No lower extremity edema.  Skin: No rashes seen.    Gait:  Nonantalgic  Tenderness L5-S1 paraspinals and S2 segment centrally.  Sensation is intact to light touch throughout the  lower extremities.  Reflexes are  . 2+ patellar and Achilles    Manual muscle testing reveals:  Right /Left out of 5     5/5 hip flexors  5/5 knee flexors  5/5 knee extensors  5/5 ankle plantar flexors  5/5 ankle dorsiflexors  5/5 ankle evertors

## 2021-06-05 NOTE — TELEPHONE ENCOUNTER
Zestar Study Consent Visit    Study description: ECG and PPG Study: Zestar Study      Marilee Weston a 59 y.o. female , was contacted by today to discuss participation in the Zestar study.     The patient called the Clinical Trials Office to inquire about study participation.  The consent form was reviewed with the patient.     The review of the study included:    Study purpose     Conflict of interest    Device description      Study visits    Risks of participation    Benefits (if any)    Alternatives    Voluntary participation    Confidentiality     Compensation/costs of participation    Study stipends    Injury and legal rights    The subject was queried in regards to her willingness to continue and come in for scheduled appointment. her questions were answered to her satisfaction.     The patient has given her preliminary agreement to volunteer to participate in the above noted study.     Plan: Marilee Weston will come to [facility] on 1/22/2020 [date] to continue consent process. If she continues to agrees to participate, the study visit will be done on the same day.    she was instructed to eat as usual before coming for study visit and take medications as usual too. she was encouraged to wear comfortable clothes and shoes to the study appointment.       Nicolasa Wilburn RN

## 2021-06-05 NOTE — TELEPHONE ENCOUNTER
Called patient back, left voicemail with our research hotline number to call back with any further questions.

## 2021-06-06 NOTE — PATIENT INSTRUCTIONS - HE
1. Increase cymbalta to 40mg daily    2. Return to trail bilateral L4-5 and L5-S1 facet injections

## 2021-06-06 NOTE — PATIENT INSTRUCTIONS - HE
You are being tested for Corona virus and possibly Influenza and/or RSV.    We will call you with your results.    Isolate Yourself:    Isolate yourself while traveling.    Do Not allow any visitors within 6 feet.    Do Not go to work or school.    Do Not go to [employer],  centers, shopping, or other public places.    Do Not shake hands.    Avoid close contact with others (hugging, kissing).    Protect Others:    Cover Your Mouth and Nose with a mask, disposable tissue or wash cloth to avoid spreading germs to others.    Wash your hands and face frequently with soap and water    Call Back If: Breathing difficulty develops or you become worse.    For more information about COVID19 and options for caring for yourself at home, please visit the CDC website at https://www.cdc.gov/coronavirus/2019-ncov/about/steps-when-sick.html  For more options for care at [facility], please visit our website at [email or web address]

## 2021-06-06 NOTE — PROGRESS NOTES
SUBJECTIVE: Here for curbside evaluation for coronaviruse referred through [facility].     Reports no new symptoms.     OBJECTIVE: no apparent distress  Eyes appear normal  Mucous membranes moist  Non diaphoretic.   No increased work of breathing   Mental status appears normal/affect normal       1. Cough  Coronavirus SARS-CoV-2 by PCR    over the counter meds and isolation discussed until results in.   Education information given. Time of visit was 15 minutes more than half in coordination of care and counseling regarding COVID and self-isolation        MARY GRACE Carlos, CNP

## 2021-06-06 NOTE — TELEPHONE ENCOUNTER
RN triage   Call from pt   Pt states she thinks she may have sinus infection   For the past week sick -- started as sore throat and stuffy nose   No fever  Today some headache and jaw hurts-- rates headache 4/10 --   No redness or swelling of cheeks/forehead/around eyes   Eyes were ' burning ' some yesterday   Some cough   Breathing OK   Reviewed home care advice   Pt will follow advice and be seen if not better or if worse   Lydia Mancilla RN [facility] RN triage    Reason for Disposition    Sinus congestion as part of a cold, present < 10 days    Protocols used: SINUS PAIN AND CONGESTION-A-[place]

## 2021-06-06 NOTE — PROGRESS NOTES
Assessment/Plan:      Diagnoses and all orders for this visit:    Lumbar spine pain  -     DULoxetine (CYMBALTA) 20 MG capsule; Take 2 capsules (40 mg total) by mouth daily.  Dispense: 60 capsule; Refill: 2  -     OPS Paravertbral Facet Joint Inj Lumbar; Future; Expected date: 02/17/2020    Arthropathy of lumbar facet joint  -     DULoxetine (CYMBALTA) 20 MG capsule; Take 2 capsules (40 mg total) by mouth daily.  Dispense: 60 capsule; Refill: 2  -     OPS Paravertbral Facet Joint Inj Lumbar; Future; Expected date: 02/17/2020    Myofascial pain    Sacroiliac joint pain    Spondylolisthesis of lumbar region  -     DULoxetine (CYMBALTA) 20 MG capsule; Take 2 capsules (40 mg total) by mouth daily.  Dispense: 60 capsule; Refill: 2  -     OPS Paravertbral Facet Joint Inj Lumbar; Future; Expected date: 02/17/2020        Assessment: Pleasant 59 y.o. female with a history of depression and thyroid disorder A. fib with:    1.  Persistent waxing and waning lumbar spine pain lumbosacral junction consistent with facet arthropathy.  She has moderate facet arthropathy L4-5 with a grade 1 spondylolisthesis as well as facet arthropathy at L5-S1.  Had good results initially from facet injection with slight improvement for a week until massage and things have worsened again.  Quite stiff first thing in the morning.    2.  She has some myofascial pain lumbar spine.    3.  May have a component of right sacroiliac joint pain as well.      Discussion:    1.  We discussed the diagnosis and treatment options.  We discussed the option of medication changes with injections and repeat imaging.  We discussed that she did have good results initially from facet injections until massage..    2.  Increase Cymbalta to 40 mg daily.  New prescription provided.    3.  She is showing her horse down in [place] in the next month and is quite worried about the travel.  She is also skiing next week.  I would recommend bilateral L4-5 and adding bilateral  L5-S1 facet injections with Dr. Marie.    4.  Can consider medial branch blocks in the future along with repeat MRI.    5.  Follow-up with me 2 to 4 weeks after facet injections.    25 minutes were spent with this patient in addition to any procedure with greater than 50% in counseling and coordination of care.    It was our pleasure caring for your patient today, if there any questions or concerns please do not hesitate to contact us.      Subjective:   Patient ID: Marilee Weston is a 59 y.o. female.    History of Present Illness: Patient presents for follow-up of low back pain.  This is at the lumbosacral junction.  Has remained fairly stable since last visit but she is unsure.  She has quite significant pain first thing in the morning when she first gets up at the lumbosacral junction with no radiation down the legs paresthesias or weakness.  Pain is an 8/10 at worst in the morning 3/10 at the best and today.  Seems to be better with movement and exercise throughout the day.  Still able to ride her horse.  Plans on going skiing next week and wants to make sure that she is not damaging anything in her back.  She does take ibuprofen or Tylenol for pain.    Did have facet injections at L4-5.  Had 1 week of very good relief until she had a massage with downward pressure on her back which increased her pain.  She has been taking Cymbalta without any side effects 20 mg at bedtime unsure if it is helpful but having no adverse effects.      Imaging: MRI report and images were personally reviewed and discussed with the patient.  A plastic model was utilized during the discussion.  MRI of the lumbar spine from [facility] personally reviewed.  This is from October 2017.  Multilevel degenerative disc disease most significant finding moderate facet arthropathy at L4-5 with grade 1 spondylolisthesis.  There also appears to be mild to moderate facet arthropathy at L5-S1 on my review.  No foraminal stenosis or nerve  impingement.    Review of Systems: No numbness, tingling or weakness.  No bowel or bladder incontinence.  No urinary retention.  No fevers, unintentional weight loss, balance changes, headaches, frequent falling, difficulty swallowing, or coordination difficulties.*      Prior interventions:  1.  Bilateral L4-5 facet injections.    Past Medical History:   Diagnosis Date     Atrial fibrillation (H) 2010     Fibrocystic breast      Osteopenia      Postsurgical hypothyroidism        The following portions of the patient's history were reviewed and updated as appropriate: allergies, current medications, past family history, past medical history, past social history, past surgical history and problem list.           Objective:   Physical Exam:    There were no vitals filed for this visit.  Body mass index is 24.48 kg/m .      General: Alert and oriented with normal affect. Attention, knowledge, memory, and language are intact. No acute distress.   Eyes: Sclerae are clear.  Respirations: Unlabored. CV: No lower extremity edema.  Skin: No rashes seen.    Gait:  Nonantalgic  Negative scour maneuver.  Mild positive Haroldo's test on the right for left-sided SI pain.  Sensation is intact to light touch throughout the  lower extremities.  Reflexes are  2+ patellar and Achilles      Manual muscle testing reveals:  Right /Left out of 5     5/5 hip flexors  5/5 knee flexors  5/5 knee extensors  5/5 ankle plantar flexors  5/5 ankle dorsiflexors  5/5   ankle evertors

## 2021-06-07 NOTE — TELEPHONE ENCOUNTER
Patient is scheduled at [facility] for a nurse only appt on 04/23/20 at 11AM for her second Shingrix Vaccine. Please place order if appropriate.    Thank you!

## 2021-06-07 NOTE — TELEPHONE ENCOUNTER
Dr. Vora,  Order has been set up for you to review.  Barbie EASON, CMA/CMT....................10:26 AM

## 2021-06-12 NOTE — PROGRESS NOTES
Assessment:     Diagnoses and all orders for this visit:    Lumbar spine pain  -     OPS Paravertbral Facet Joint Inj Lumbar; Future; Expected date: 11/04/2020    Arthropathy of lumbar facet joint  -     OPS Paravertbral Facet Joint Inj Lumbar; Future; Expected date: 11/04/2020    Myofascial pain    Spondylolisthesis of lumbar region  -     OPS Paravertbral Facet Joint Inj Lumbar; Future; Expected date: 11/04/2020    DDD (degenerative disc disease), lumbar  -     OPS Paravertbral Facet Joint Inj Lumbar; Future; Expected date: 11/04/2020       Marilee Weston is a 60 y.o. y.o. female with past medical history significant for depression and thyroid disorder, A. fib who presents today for follow-up regarding:    -Chronic bilateral low back pain at the belt line and lumbosacral junction consistent with facet mediated pain as she does have increased pain with facet loading on exam today.  She did get relief with previous facet joint steroid injection for 6 months of her pain at the belt line however still was having pain at the lower lumbar sacral junction.  Patient does have facet arthropathy at L4-5 and L5-S1 noted on MRI with spondylolisthesis.    No current radicular pain.    *Primary spine provider Dr. Ibrahim.     Plan:     A shared decision making plan was used. The patient's values and choices were respected. Prior medical records from 2/17/2020 to current were reviewed today. The following represents what was discussed and decided upon by the provider and the patient.        -DIAGNOSTIC TESTS: Images were personally reviewed and interpreted.   --MR spine MRI CDI with significant degenerative disc changes at L5-S1 and L4-5 with facet arthropathy left greater than right.  3 mm L4-5 degenerative spondylolisthesis.  -Lumbar spine flexion-extension x-ray 11/11/2019 with grade one 3 mm L4-5 spondylolisthesis, no instability.  Chronic L1 compression fracture.  Advanced disc height with moderate facet arthropathy L4-5  and L5-S1.    -INTERVENTIONS: Ordered bilateral L4-5 and L5-S1 facet joint steroid injection to see if we get further relief of her facet mediated pain as she does have facet arthropathy at L4-5 and L5-S1.  She did get some relief with previous L4 5 injection back in December 2019 of her low back pain at the belt line however minimal relief with her back pain at the lumbosacral junction.  Currently her pain is both at the lumbosacral junction and low back.  We did discuss trialing medial branch block for radiofrequency ablation option however patient defers this at this time as she is not interested in this option as she is leaving out of state in the next couple of weeks.  Patient is hoping for facet joint steroid injection as this was helpful for her previously at the belt line.  -Dr. Malagon had previously ordered bilateral L4-5 and L5-S1 facet joint steroid injection 2/17/2020 and [id number] review was completed at that time with authorization.  However she did not complete the injection at that time due to Covid and improved symptoms.    -MEDICATIONS: Advised patient to continue with Tylenol and Celebrex which gives her short-term minimal relief only and Cymbalta.  Discussed side effects of medications and proper use. Patient verbalized understanding.    -PHYSICAL THERAPY: Advised patient to continue with home exercises from prior physical therapy sessions which she is diligent about doing she states.  Patient had 25 sessions of physical therapy previously with physicians neck and back.  Discussed the importance of core strengthening, ROM, stretching exercises with the patient and how each of these entities is important in decreasing pain.  Explained to the patient that the purpose of physical therapy is to teach the patient a home exercise program.  These exercises need to be performed every day in order to decrease pain and prevent future occurrences of pain.        -PATIENT EDUCATION:  20 minutes of total  visit time was spent face to face with the patient today, 60 % of the visit was spent on counseling, education, and coordinating care.   -5 minutes spent outside of visit time, non-face-to-face time, reviewing chart.  -Today we also discussed the issues related to the current COVID-19 pandemic, the pros and cons of the current treatment plan, the CDC guidelines such as social distancing, washing the hands, and covering the cough.    -FOLLOW UP: Follow-up for injection with Dr. Ngo 2 weeks postinjection.  Advised to contact clinic if symptoms worsen or change.    Subjective:     Marilee Weston is a 60 y.o. female who presents today for follow-up regarding chronic bilateral low back pain at the belt line and lumbosacral junction that radiates in the upper buttock region that is currently a constant 4/10, and 8 at its worst most bothersome first thing in the morning with a lot of stiffness in her back and she also has some increased pain with standing and walking does improve with sitting as well as her physical therapy home exercises however she does deal with this on a regular basis and is frustrated with this.  She did have a L4-5 facet injection back in December 2019 and did feel that that helped with her back pain on the upper component at the belt line however she was still having pain at the lower lumbar sacral junction and now her pain is back to both spots at the belt line and lumbosacral junction.  Patient denies lower extremity pain, denies lower extremity weakness, denies any recent trips or falls or balance changes, denies numbness or tingling sensations.    Treatment to Date:     Bilateral L4-5 facet joint steroid injection 12/27/2019 with significant relief x1 week, minimal lasting benefit.  Preprocedure pain 5/10, post 1/10.    -Medications:  Tylenol 500 mg  Celebrex 200 mg  Cymbalta 20 mg    Patient Active Problem List   Diagnosis     Paroxysmal atrial fibrillation (H)     Postsurgical  hypothyroidism     Osteopenia, unspecified location       Current Outpatient Medications on File Prior to Encounter   Medication Sig Dispense Refill     acetaminophen (TYLENOL) 500 MG tablet Take 1-2 tablets (500-1,000 mg total) by mouth every 6 (six) hours as needed for pain.  0     DULoxetine (CYMBALTA) 20 MG capsule TAKE 2 CAPSULES BY MOUTH ONCE DAILY 60 capsule 2     calcium citrate/vitamin D3 (CITRACAL + D ORAL) Take 2 tablets by mouth daily.        estradiol (ESTRACE) 0.01 % (0.1 mg/gram) vaginal cream Insert 2 g into the vagina 2 (two) times a week.       estradiol (VAGIFEM) 10 mcg Tab Insert 10 mcg into the vagina 2 (two) times a week.       estradiol-levonorgestrel (CLIMARAPRO) 0.045-0.015 mg/24 hr Place 1 patch on the skin once a week.       FLAXSEED ORAL Take 1 tablet by mouth daily.        levothyroxine (SYNTHROID, LEVOTHROID) 100 MCG tablet Take 100 mcg by mouth daily.       metoprolol succinate (TOPROL-XL) 50 MG 24 hr tablet Take 1.5 tablets (75 mg total) by mouth daily.       OMEGA-3S-DHA-EPA-FISH OIL-D3 ORAL Take 2,400 Units by mouth.       polyethylene glycol (MIRALAX) 17 gram packet Take 17 g by mouth daily.       [DISCONTINUED] celecoxib (CELEBREX) 200 MG capsule Take 1 capsule (200 mg total) by mouth daily. 90 capsule 3     No current facility-administered medications on file prior to encounter.        Allergies   Allergen Reactions     Penicillins Rash     She was on cough syrup at the same time and suspects rash was actually due to cough syrup.       Past Medical History:   Diagnosis Date     Atrial fibrillation (H) 2010     Fibrocystic breast      Osteopenia      Postsurgical hypothyroidism         Review of Systems  ROS: Positive for increased pain in the evening hours and first thing in the morning.  Specifically negative for bowel/bladder dysfunction, balance changes, headache, dizziness, foot drop, fevers, chills, appetite changes, nausea/vomiting, unexplained weight loss. Otherwise 13  "systems reviewed are negative. Please see the patient's intake questionnaire from today for details.    Reviewed Social, Family, Past Medical and Past Surgical history with patient, no significant changes noted since prior visit.     Objective:     /84 (Patient Site: Right Arm, Patient Position: Sitting)   Ht 5' 8\" (1.727 m)   Wt 155 lb (70.3 kg)   BMI 23.57 kg/m      PHYSICAL EXAMINATION:    --CONSTITUTIONAL: Well developed, well nourished, healthy appearing individual.  --PSYCHIATRIC: Appropriate mood and affect. No difficulty interacting due to temper, social withdrawal, or memory issues.  --SKIN: Lumbar region is dry and intact.   --RESPIRATORY: Normal rhythm and effort. No abnormal accessory muscle breathing patterns noted.   --MUSCULOSKELETAL:  Normal lumbar lordosis noted, no lateral shift.  --GROSS MOTOR: Easily arises from a seated position. Gait is non-antalgic  --LUMBAR SPINE:  Inspection reveals no evidence of deformity. Range of motion is not limited in lumbar flexion, increased pain with lumbar extension and lateral rotation simultaneously bilaterally.  Tenderness to palpation lower lumbar spine at the belt line and lumbosacral junction bilaterally. Straight leg raising in the seated position is negative to radicular pain. Sciatic notch non-tender.   --SACROILIAC JOINT: Negative one Finger point test.  --LOWER EXTREMITY MOTOR TESTING:  Plantar flexion left 5/5, right 5/5   Dorsiflexion left 5/5, right 5/5   Great toe MTP extension left 5/5, right 5/5  Knee flexion left 5/5, right 5/5  Knee extension left 5/5, right 5/5   Hip flexion left 5/5, right 5/5  Hip abduction left 5/5, right 5/5  Hip adduction left 5/5, right 5/5   --HIPS: Full range of motion bilaterally. Negative FABERs on the involved lower extremity.   --NEUROLOGIC: Bilateral patellar and achilles reflexes are physiologic and symmetric. Sensation to light touch is intact in the bilateral L4, L5, and S1 dermatomes.    RESULTS: "   Imaging: Lumbar spine imaging was reviewed today. The images were shown to the patient and the findings were explained using a spine model.      XR LUMBAR SPINE FLEX AND EXT 2 OR 3 VWS  LOCATION: [facility]  DATE/TIME: 11/11/2019   INDICATION: Low back pain  COMPARISON: X-ray lumbar spine dated 05/22/2018  IMPRESSION:   Grade 1 anterolisthesis of L4 over L5 (3 mm), which is unchanged during flexion and extension. Chronic superior endplate deformity of L1 with minimal height loss. No acute fracture. Multilevel degenerative change most prominent at L4-L5 and   L5-S1 where there is advanced disc height loss and moderate facet arthropathy.

## 2021-06-12 NOTE — PATIENT INSTRUCTIONS - HE
~Please call our [facility] Nurse Navigation line [phone] with any questions or concerns about your treatment plan, if symptoms worsen and you would like to be seen urgently, or if you have problems controlling bladder and bowel function.    A bilateral L4-5 and L5-S1 facet joint steroid injection has been ordered today to potentially help with your pain symptoms. These injections do not fix what is going on in your back, therefore they typically do not take away the pain completely, however they can many times help improve symptoms. Injections should always be completed along with other modalities such as physical therapy for the best long term outcomes. If injections alone are done, then pain will likely return.     [facility] Injection Requirements      A  is required for all fluoroscopically-guided injections.    Injection appointments may be cancelled if there are signs/symptoms of an active infection or if the patient is being actively treated with antibiotics for a diagnosed infection.    Patients may have their steroid injection cancelled if they have had another steroid injection within 2 weeks.    Diabetic patients will have their blood glucose levels checked the day of their injection and the appointment will be rescheduled if the blood glucose level is 300 or higher.    Patients with allergies to cortisone, local anesthetics, iodine, or contrast dye should contact the Spine Center to further discuss these considerations.    Patients scheduled for medial branch block diagnostic injections should refrain from taking pain medication the day of the procedure.  The medial branch block injection appointment will be rescheduled if the patient's pain rating is not 5/10 or greater at the time of the procedure.    Patients taking warfarin/Coumadin will have their INR checked the day of the procedure and the procedure may be rescheduled if the INR is greater than  3.0.    Please contact the Spine Center (#[phone]) if you are taking any prescription blood-thinning medications (warfarin, Plavix, Lovenox, Eliquis, Brilinta, Effient, etc.) as special dosing adjustments may need to be made depending on the type of injection you are scheduled to receive.    It is recommended that you delay having your steroid injection if you have received a flu shot or shingles vaccine within 2 weeks.

## 2021-06-13 NOTE — TELEPHONE ENCOUNTER
If she is having some new pain down her leg/in a radicular fashion, please have her schedule a video visit with me to discuss her pain we can discuss if that injection is appropriate or if we need to reconsider the type of injection.

## 2021-06-13 NOTE — TELEPHONE ENCOUNTER
"PSP:  Dr. Malagon  Last clinic visit:  11/4/20 w/ Evangelina Del Valle CNP  Reason for call: back pain and injection status  Clinical information:  Reports she has some new right buttock and outer right leg pain \"I think it is more muscle pain. I am also supposed to have an injection and I want to make sure that it is the right one. It hasn't been approved yet.\" requesting to come in and be seen by her PSP.   Advice given to patient: Explained PA team has been working on this and a message was sent to PSP that a Peer to peer needs to be done (see staff message for more info).   Provider to address: FYI  On your schedule for tomorrow AM  "

## 2021-06-13 NOTE — PROGRESS NOTES
Assessment/Plan:      Diagnoses and all orders for this visit:    Lumbar radicular pain  -     MR Lumbar Spine Without Contrast; Future; Expected date: 12/18/2020    Spondylolisthesis of lumbar region  -     MR Lumbar Spine Without Contrast; Future; Expected date: 12/18/2020  -     DULoxetine (CYMBALTA) 30 MG capsule; Take 2 capsules (60 mg total) by mouth daily.  Dispense: 60 capsule; Refill: 2    Arthropathy of lumbar facet joint  -     MR Lumbar Spine Without Contrast; Future; Expected date: 12/18/2020    DDD (degenerative disc disease), lumbar  -     MR Lumbar Spine Without Contrast; Future; Expected date: 12/18/2020  -     DULoxetine (CYMBALTA) 30 MG capsule; Take 2 capsules (60 mg total) by mouth daily.  Dispense: 60 capsule; Refill: 2    Lumbar spine pain    Myofascial pain        Assessment: Pleasant 60 y.o. female with past medical history significant for depression and thyroid disorder, A. fib with:    1.  6-week history of right greater than left gluteal pain and right lower extremity radicular pain to the knee.  She has at least moderate right foraminal stenosis on MRI from 2017 related to spondylolisthesis L4 and L5.  She also has central disc herniation L1-2 with some lateral recess stenosis.    2.  Chronic lumbar spine pain flared/progressed recently.  Unknown if back pain is related to new radicular pain.  She does have spondylolisthesis with severe facet arthropathy L4-5 and facet arthropathy L5-S1.    3.  Myofascial pain gluteal region greater trochanters.      Discussion:    1.  Discussed the diagnosis and treatment options.  We discussed options of injection such as facet injection along with lumbar epidural.  We also discussed therapy and medication changes.    2.  Recommend new MRI lumbar spine to evaluate for progression of spondylolisthesis and foraminal stenosis on the right at L4-5 as well as central stenosis L1-2.    3.  She had been ordered facet injections bilateral L4-5 and L5-S1 for  the lumbar spine pain.  She had gotten some good relief after the L4-5 injections but only for several weeks with a plan to add the L5-S1 facet injections.  It has been a year since her last injections.  There would be an option to progress to facet radiofrequency ablation but the patient is declining more aggressive ablation procedures and would like to trial facet injections with steroids.  This would be potentially good option for the lumbar spine pain but need to evaluate the new MRI and if there is progressed foraminal stenosis or nerve compression then an epidural at this point might be more appropriate.    4.  Increase Cymbalta to 60 mg daily from 40 mg.  She is tolerating this well unsure if it is helping with her pain but will trial the increase.    5.  Follow-up with phone recommendations after MRI.    It was our pleasure caring for your patient today, if there any questions or concerns please do not hesitate to contact us.      Subjective:   Patient ID: Marilee Weston is a 60 y.o. female.    History of Present Illness: Patient presents for evaluation of low back pain with new right lower extremity pain in the back of the leg just past the knee.  She is having some on the left gluteal region as well.  She has had chronic history of low back pain lumbosacral junction she points to the PSIS region and across the S2 segment bilaterally.  She has an L4 and L5 spondylolisthesis with severe facet arthropathy and also facet arthropathy L5-S1.  Had facet injections 1 year ago which gave her some relief that was fairly good but short-term and in February we opted for bilateral L4-5 and L5-S1 facet injections.  The injections were approved at that time after [id number] review and subsequently she did not have the injection is performed due to Covid.  She is then seen by Evangelina several weeks ago and the injections were reordered.  They pending approval and now needing a [id number] review.  She still has low  back pain as well was doing her home exercises not as much over the past month.  This was from [facility].        She still has low back pain across the buttock PSIS region however she has developed new right lower extremity pain down the back of the leg to the upper calf region.  This started after a trip to [place] over a month ago.  She was there a month returning a few days ago.  She began having the pain on the trip down but then while riding the horse her horse stopped suddenly and had severe pain at that point.  Any driving causes severe pain down the leg better with heat or hot tub.  Pain is a 9/10 at worst 6/10 today 3/10 at best.  Posterior lateral leg pain.  No numbness tingling or weakness in the legs.    She is tolerating Cymbalta 40 mg daily.  Feels happier mood.  Unsure if her pain is improved.  Does take Celebrex occasionally but causes stomach issues.    Imaging: *MRI report and images were personally reviewed and discussed with the patient.  A plastic model was utilized during the discussion.  MRI of the lumbar spine from [facility] from 2017 was personally reviewed.  This shows severe degenerative disc disease L4-5 with grade 1 spondylolisthesis central disc extrusion.  Moderate right L5 nerve impingement there also appears to be a foraminal disc protrusion impinging on the right L4 nerve and I would note at least moderate foraminal stenosis.  L5-S1 severe disc height loss/degenerative disc disease with mild to moderate facet joint arthropathy left greater than right.    Flexion-extension x-raysPerformed November of last year showed no instability from flexion to extension.    Review of Systems: Pertinent positives: She has headaches.  Some urinary leakage but no bowel or bladder incontinence right..  Pertinent negatives: No numbness, tingling or weakness of the legs.  No bowel or bladder incontinence.  No urinary retention.  No fevers, unintentional weight loss, balance changes,   frequent  falling, difficulty swallowing, or coordination difficulties.  All others reviewed are negative.           Past Medical History:   Diagnosis Date     Atrial fibrillation (H) 2010     Fibrocystic breast      Osteopenia      Postsurgical hypothyroidism        The following portions of the patient's history were reviewed and updated as appropriate: allergies, current medications, past family history, past medical history, past social history, past surgical history and problem list.           Objective:   Physical Exam:    Vitals:    12/18/20 0830   BP: 137/74   Pulse: 61     There is no height or weight on file to calculate BMI.      General: Alert and oriented with normal affect. Attention, knowledge, memory, and language are intact. No acute distress.   Eyes: Sclerae are clear.  Respirations: Unlabored. CV: No lower extremity edema.  Skin: No rashes seen.    Gait:  Nonantalgic  Tenderness palpation greater trochanters bilaterally right greater than left right SI joint and sciatic notch.  Positive facet loading bilaterally.  Sensation is intact to light touch throughout the upper and lower extremities.  Reflexes are 2+ and symmetric in the biceps triceps and brachioradialis with negative Hoffmans. 2+ patellar and 1+  Achilles   Negative SHI test, Gaenslen's, SI distraction today.    Manual muscle testing reveals:  Right /Left out of 5     5/5 hip flexors  5/5 knee flexors  5/5 knee extensors  5/5 ankle plantar flexors  5/5 ankle dorsiflexors  5/5  EHL

## 2021-06-13 NOTE — PATIENT INSTRUCTIONS - HE
1. An MRI was ordered for you today.  You will be contacted by scheduling within 3 days.    If you are not contacted, please call Radiology at [phone].      2. Increase Cymbalta to 60mg daily

## 2021-06-14 ENCOUNTER — OFFICE VISIT - HEALTHEAST (OUTPATIENT)
Dept: INTERNAL MEDICINE | Facility: CLINIC | Age: 61
End: 2021-06-14

## 2021-06-14 DIAGNOSIS — R10.13 DYSPEPSIA: ICD-10-CM

## 2021-06-14 DIAGNOSIS — I48.0 PAROXYSMAL ATRIAL FIBRILLATION (H): ICD-10-CM

## 2021-06-14 DIAGNOSIS — R10.11 POSTPRANDIAL ABDOMINAL PAIN IN RIGHT UPPER QUADRANT: ICD-10-CM

## 2021-06-14 LAB
ALBUMIN SERPL-MCNC: 3.9 G/DL (ref 3.5–5)
ALP SERPL-CCNC: 55 U/L (ref 45–120)
ALT SERPL W P-5'-P-CCNC: 20 U/L (ref 0–45)
ANION GAP SERPL CALCULATED.3IONS-SCNC: 10 MMOL/L (ref 5–18)
AST SERPL W P-5'-P-CCNC: 20 U/L (ref 0–40)
BILIRUB SERPL-MCNC: 0.8 MG/DL (ref 0–1)
BUN SERPL-MCNC: 18 MG/DL (ref 8–22)
CALCIUM SERPL-MCNC: 8.9 MG/DL (ref 8.5–10.5)
CHLORIDE BLD-SCNC: 106 MMOL/L (ref 98–107)
CO2 SERPL-SCNC: 25 MMOL/L (ref 22–31)
CREAT SERPL-MCNC: 0.71 MG/DL (ref 0.6–1.1)
ERYTHROCYTE [DISTWIDTH] IN BLOOD BY AUTOMATED COUNT: 12.4 % (ref 11–14.5)
GFR SERPL CREATININE-BSD FRML MDRD: >60 ML/MIN/1.73M2
GLUCOSE BLD-MCNC: 101 MG/DL (ref 70–125)
HCT VFR BLD AUTO: 40.3 % (ref 35–47)
HGB BLD-MCNC: 13.1 G/DL (ref 12–16)
LIPASE SERPL-CCNC: 37 U/L (ref 0–52)
MCH RBC QN AUTO: 31.9 PG (ref 27–34)
MCHC RBC AUTO-ENTMCNC: 32.5 G/DL (ref 32–36)
MCV RBC AUTO: 98 FL (ref 80–100)
PLATELET # BLD AUTO: 175 THOU/UL (ref 140–440)
PMV BLD AUTO: 9.9 FL (ref 7–10)
POTASSIUM BLD-SCNC: 4.3 MMOL/L (ref 3.5–5)
PROT SERPL-MCNC: 6.6 G/DL (ref 6–8)
RBC # BLD AUTO: 4.11 MILL/UL (ref 3.8–5.4)
SODIUM SERPL-SCNC: 141 MMOL/L (ref 136–145)
WBC: 4.7 THOU/UL (ref 4–11)

## 2021-06-14 ASSESSMENT — MIFFLIN-ST. JEOR: SCORE: 1335.15

## 2021-06-14 NOTE — TELEPHONE ENCOUNTER
PSP:  Dr. Malagon  Last clinic visit:  1/8/2021 Injection; 12/18/2020 OV  Reason for call: 2 week post-injection follow-up call  Clinical information:  Pt returned call. Pt reports she feels 100% better after her injection. She states at times she still experiences intermittent pain across her right groin area that bothers her when she is sitting.   Pt inquires if she could get referral for physical therapy in [place]. She requests that referral be written if possible and faxed to her. Fax # [phone]. Pt states if unable to fax it can be mailed to her at:  [address] [place] [address]  Pt requesting to be called back if referral placed and how it was sent to her so she is aware.   Pt additionally asking about her injection as she paid for this out of pocket and is hoping to get reimbursed by her insurance. Advised pt that she will need to contact her insurance company about this. She stated understanding.   Advice given to patient: Informed pt that she will be called back with provider's response. Pt aware that provider is not in clinic today, returning next week. Detailed message ok.   Provider to address: Please place external PT referral if appropriate.

## 2021-06-14 NOTE — TELEPHONE ENCOUNTER
----- Message from David Malagon DO sent at 12/28/2020 12:26 PM CST -----  MRI reviewed showing mild spondylolisthesis L4 and L5 with facet arthropathy.  There is severe foraminal stenosis on the right at L4-5.  And moderate on the left at L5-S1 she does have a central disc bulge at L2-3 as well which is unchanged.    Given her symptoms of right greater than left low back pain and gluteal pain, I recommend trial of a right L4-5 transforaminal epidural steroid injection.  If she continues to have left-sided gluteal pain can consider further injections on the left side.  
Call placed to pt with provider's results and recommendations. Pt stated understanding. Pt would like to proceed with recommended injection. Order placed for Right L4-5 TFESI with Dr. Marie. Transferred to scheduling to make appt.   
<-- Click to add NO significant Past Surgical History

## 2021-06-14 NOTE — PATIENT INSTRUCTIONS - HE
A nurse from the [facility] will contact you in 2 weeks to see how you're doing after the injection.       DISCHARGE INSTRUCTIONS    During office hours (8:00 a.m.- 4:00 p.m.) questions or concerns may be answered  by calling [facility] Navigation Nurses at  [phone].  Messages received after hours will be returned the following business day.      In the case of an emergency, please dial 911 or seek assistance at the nearest Emergency Room/Urgent Care facility.     All Patients:    ? You may experience an increase in your symptoms for the first 2 days (It may take anywhere between 2 days- 2 weeks for the steroid to have maximum effect).    ? You may use ice on the injection site, as frequently as 20 minutes each hour if needed.    ? You may take your pain medicine.    ? You may continue taking your regular medication after your injection. If you have had a Medial Branch Block you may resume pain medication once your pain diary is completed.    ? You may shower. No swimming, tub bath or hot tub for 48 hours.  You may remove your bandaid/bandage as soon as you are home.    ? You may resume light activities, as tolerated.    ? Resume your usual diet as tolerated.    ? It is strongly advised that you do not drive for 1-3 hours post injection.    ? If you have had oral sedation:  Do not drive for 8 hours post injection.      ? If you have had IV sedation:  Do not drive for 24 hours post injection.  Do not operate hazardous machinery or make important personal/business decisions for 24 hours.      POSSIBLE STEROID SIDE EFFECTS (If steroid/cortisone was used for your procedure)    -If you experience these symptoms, it should only last for a short period      Swelling of the legs                Skin redness (flushing)       Mouth (oral) irritation     Blood sugar (glucose) levels              Sweats                      Mood changes    Headache    Sleeplessness    Weakened immune system for up to 14 days, which  could increase the risk of jaylyn the COVID-19 virus and/or experiencing more severe symptoms of the disease, if exposed.    Decreased effectiveness of the flu vaccine if given within 2 weeks of the steroid.         POSSIBLE PROCEDURE SIDE EFFECTS  -Call the [facility] if you are concerned    Increased Pain             Increased numbness/tingling        Nausea/Vomiting            Bruising/bleeding at site        Redness or swelling                                                Difficulty walking        Weakness             Fever greater than 100.5    *In the event of a severe headache after an epidural steroid injection that is relieved by lying down, please call the [facility] to speak with a clinical staff member*

## 2021-06-14 NOTE — TELEPHONE ENCOUNTER
"  Patient called to check on progress of PA earlier today. Explained it is in process of awaiting word from insurance company. Encouraged her to contact her insurance company.     Patient called in now stating she spoke to  and had conference call with Alex. This most recent injection \"has been denied because they say I have not been exercising or doing PT or taking anti-inflammatories. Which is not true. I have been to PT, most recently in June 2020 at [facility] with Luis Felipe Kaur. I do my exercises and stretches religiously. I also have Celebrex here that I take. They said to do this:     Send in a REQUEST FOR RECONSIDERATION for URGENT REVIEW due to extreme pain  Fax the information to [phone] and use case #[id number].   Patient is scheduled for this injection on Monday 1/11/21.   "

## 2021-06-14 NOTE — TELEPHONE ENCOUNTER
"PSP:  Dr. Malagon   Last clinic visit:  12/18/20 follow up; 1/8/21 for Right L4-L5 TFESI  Reason for call: Left message on nurse navigation line asking if PSP could send in a prescription for Celebrex.   Clinical information:  \"I have been using an old script that I had left over. I take 1 a day and it helps me with the pain. Could he send it to my [place] pharmacy and if he can't use the one in on [address].\"   Advice given to patient: Phone call to discuss. Reached patient's voice mail. Left message that PSP would address this request upon his return.  Provider to address: New script for Celebrex  "

## 2021-06-14 NOTE — TELEPHONE ENCOUNTER
Per information from PA team:     Please note the reference number in the denial letter is [id number], which differs from the one Marilee provided. Might be a good idea to use both in the fax.     Peer to Peer Request     Patient Name:            Marilee Weston   : 1960   MRN: [id number]     The authorization for procedure TFESI l/s on date of service 2021 has been denied. We were unsuccessful in obtaining approval through clinical review. A kobx-hx-xlox review can be done by calling the insurance/third party authorization vendor with the following information:     Insurance: [employer] [place]   Auth Vendor: [employer]   Phone: [phone] - For clarification of decision and directions for urgent reconsideration.       Patient ID: [id number]   Case/Ref #: [id number]

## 2021-06-14 NOTE — TELEPHONE ENCOUNTER
"Patient calling in to report she has been in contact with her insurance company again. \"They said there is no way they will be able to approve the scheduled injection for Monday.\" Explained PSP and MA are in process of getting reconsideration/appeal set up.     Patient is inquiring about cost of injection as she wants to pay out of pocket at this point. She does state she wants PSP to continue to work on getting it authorized and will work through insurance for reimbursement if they do end up approving it. Billing department/office number provided for her to reach out to them regarding cost.     Also wanting to know if she can have the injection tomorrow instead of waiting for Monday as not waiting for PA to move forward. Schedule reviewed. Dr. Marie does have opening. Transferred to scheduling to change appointment.    "

## 2021-06-14 NOTE — TELEPHONE ENCOUNTER
Follow-up call placed to pt after Right L4-5 TFESI with Dr. Marie on 1/8/2021. Left message to return call.

## 2021-06-16 NOTE — TELEPHONE ENCOUNTER
Please contact the patient and inform her that I will provide Celebrex 200 mg daily for her.  Medication will be sent to the pharmacy.

## 2021-06-16 NOTE — TELEPHONE ENCOUNTER
Phone call to patient to let her know that the provider prescribed Celebrex and was sent to the pharmacy. Patient was thankful.

## 2021-06-16 NOTE — TELEPHONE ENCOUNTER
I will not refill Celebrex 100 mg daily.  She is taking 200 mg daily.  Please contact her to see if she needs 200 mg refilled.

## 2021-06-18 ENCOUNTER — HOSPITAL ENCOUNTER (OUTPATIENT)
Dept: ULTRASOUND IMAGING | Facility: HOSPITAL | Age: 61
Discharge: HOME | End: 2021-06-18
Attending: INTERNAL MEDICINE
Payer: COMMERCIAL

## 2021-06-18 DIAGNOSIS — R10.13 DYSPEPSIA: ICD-10-CM

## 2021-06-18 DIAGNOSIS — R10.11 POSTPRANDIAL ABDOMINAL PAIN IN RIGHT UPPER QUADRANT: ICD-10-CM

## 2021-06-21 ENCOUNTER — COMMUNICATION - HEALTHEAST (OUTPATIENT)
Dept: PHYSICAL MEDICINE AND REHAB | Facility: CLINIC | Age: 61
End: 2021-06-21

## 2021-06-21 DIAGNOSIS — M51.369 DDD (DEGENERATIVE DISC DISEASE), LUMBAR: ICD-10-CM

## 2021-06-21 DIAGNOSIS — M43.16 SPONDYLOLISTHESIS OF LUMBAR REGION: ICD-10-CM

## 2021-06-23 ENCOUNTER — HOSPITAL ENCOUNTER (OUTPATIENT)
Dept: MAMMOGRAPHY | Facility: CLINIC | Age: 61
Discharge: HOME | End: 2021-06-23
Attending: INTERNAL MEDICINE
Payer: COMMERCIAL

## 2021-06-23 DIAGNOSIS — Z12.31 VISIT FOR SCREENING MAMMOGRAM: ICD-10-CM

## 2021-06-26 NOTE — PROGRESS NOTES
Office Visit - Follow Up   Marilee Weston   61 y.o. female    Date of Visit: 6/14/2021    Chief Complaint   Patient presents with     Gastroesophageal Reflux     has not improved since over the winter, no relief with omeprazole     Nausea     patient gets nausea if she does not eat        Assessment and Plan   1. Dyspepsia  Discussed with her evaluation including labs imaging studies and upper endoscopy.  She would like to defer on the endoscopy.  Therefore I recommended omeprazole 20 mg daily, cessation of Celebrex, use of acetaminophen for pain control and avoidance of alcohol for 1 month.  If her symptoms improve she could consider resuming Celebrex if needed while concurrently taking omeprazole.  If her symptoms do not improve I will recommend that she obtain an upper endoscopy  - omeprazole (PRILOSEC) 20 MG capsule; Take 1 capsule (20 mg total) by mouth daily before breakfast.  Dispense: 90 capsule; Refill: 3  - Comprehensive Metabolic Panel  - HM2(CBC w/o Differential)  - Lipase  - US Abdomen Limited; Future    2. Postprandial abdominal pain in right upper quadrant  - Comprehensive Metabolic Panel  - HM2(CBC w/o Differential)  - Lipase  - US Abdomen Limited; Future    3. Paroxysmal atrial fibrillation (H)  She is not on anticoagulation with low stroke risk.  She approximates 63 this will need to be reconsidered    Return in about 4 weeks (around 7/12/2021) for recheck.     History of Present Illness   This 61 y.o. old woman comes in for evaluation of dyspepsia.  Her symptoms started in the fall, shortly after resuming Celebrex and starting duloxetine.  She feels that she has an emptiness in her upper abdomen lower chest if she does not eat.  If she eats this improves but then she will occasionally get some pain after she eats as well.  She feels nauseated which is improved with eating.  She has not vomited.  She has no blood in her stool or black tarry stool.  She does not feel like she has any reflux  "symptoms.  No significant cough.  She has been taking MiraLAX for redundant colon which has been helpful.    Review of Systems: A comprehensive review of systems was negative except as noted.     Medications, Allergies and Problem List   Reviewed, reconciled and updated  Post Discharge Medication Reconciliation Status:      Physical Exam   General Appearance:   No acute distress    /74 (Patient Site: Left Arm, Patient Position: Sitting, Cuff Size: Adult Regular)   Pulse 60   Ht 5' 8\" (1.727 m)   Wt 161 lb 4.8 oz (73.2 kg)   SpO2 97%   BMI 24.53 kg/m      HEENT exam is unremarkable  Cardiovascular regular rate and rhythm no murmur gallop or rub  Pulmonary lungs are clear to auscultation bilaterally  Gastrointestinal abdomen soft nontender nondistended no organomegaly  Neurologic exam is non focal  Psychiatric pleasant, no confusion or agitation        Additional Information   Current Outpatient Medications   Medication Sig Dispense Refill     acetaminophen (TYLENOL) 500 MG tablet Take 1-2 tablets (500-1,000 mg total) by mouth every 6 (six) hours as needed for pain.  0     calcium carbonate-vit D3-min 600 mg calcium- 400 unit Tab Take 1 tablet by mouth.       calcium citrate/vitamin D3 (CITRACAL + D ORAL) Take 2 tablets by mouth daily.        celecoxib (CELEBREX) 200 MG capsule Take 1 capsule (200 mg total) by mouth daily. 30 capsule 2     docosahexaenoic acid-epa 120-180 mg cap Take 1 g by mouth.       DULoxetine (CYMBALTA) 30 MG capsule TAKE 2 CAPSULES(60 MG) BY MOUTH DAILY 60 capsule 2     estradiol (ESTRACE) 0.01 % (0.1 mg/gram) vaginal cream Insert 2 g into the vagina 2 (two) times a week.       estradiol (VAGIFEM) 10 mcg Tab Insert 10 mcg into the vagina 2 (two) times a week.       estradiol-levonorgestrel (CLIMARAPRO) 0.045-0.015 mg/24 hr Place 1 patch on the skin once a week.       FLAXSEED ORAL Take 1 tablet by mouth daily.        levothyroxine (SYNTHROID, LEVOTHROID) 100 MCG tablet Take 100 mcg by " mouth daily.       metoprolol succinate (TOPROL-XL) 50 MG 24 hr tablet Take 1.5 tablets (75 mg total) by mouth daily.       OMEGA-3S-DHA-EPA-FISH OIL-D3 ORAL Take 2,400 Units by mouth.       polyethylene glycol (MIRALAX) 17 gram packet Take 17 g by mouth daily.       omeprazole (PRILOSEC) 20 MG capsule Take 1 capsule (20 mg total) by mouth daily before breakfast. 90 capsule 3     No current facility-administered medications for this visit.      Allergies   Allergen Reactions     Penicillins Rash     She was on cough syrup at the same time and suspects rash was actually due to cough syrup.     Social History     Tobacco Use     Smoking status: Never Smoker     Smokeless tobacco: Never Used   Substance Use Topics     Alcohol use: Yes     Alcohol/week: 7.0 standard drinks     Types: 7 Glasses of wine per week     Drug use: No       Review and/or order of clinical lab tests:  Review and/or order of radiology tests:  Review and/or order of medicine tests:  Discussion of test results with performing physician:  Decision to obtain old records and/or obtain history from someone other than the patient:  Review and summarization of old records and/or obtaining history from someone other than the patient and.or discussion of case with another health care provider:  Independent visualization of image, tracing or specimen itself:    Time:      Chang Vora MD

## 2021-06-28 NOTE — PROGRESS NOTES
Progress Notes by Yissel Linder CNP at 1/22/2020 12:30 PM     Author: Yissel Linder CNP Service: -- Author Type: Nurse Practitioner    Filed: 1/22/2020  2:53 PM Encounter Date: 1/22/2020 Status: Signed    : Yissel Linder CNP (Nurse Practitioner)        Zestar Study    Physical Examination  For abnormal findings, please evaluate if the finding is Clinically Significant (by 'CS') or Not Clinically Significant (by 'NCS')  General Appearance Normal  Head and Neck  Normal  Lungs    Normal  Cardiovascular  Normal  Abdomen   Normal  Musculoskeletal/Extremities Normal   Lymph Nodes  Normal  Skin    Normal  Neurological   Normal   Tremor absent     If present, evaluate severity on 1-10 scale    Yissel Linder CNP

## 2021-06-28 NOTE — PROGRESS NOTES
Progress Notes by Behr-Holewinski, Sierra, RN at 1/22/2020 12:30 PM     Author: Behr-Holewinski, Sierra, RN Service: -- Author Type: Patient Access    Filed: 1/27/2020 10:31 AM Encounter Date: 1/22/2020 Status: Addendum    : Behr-Holewinski, Sierra, RN (Registered Nurse)    Related Notes: Original Note by Behr-Holewinski, Sierra, RN (Registered Nurse) filed at 1/22/2020  2:53 PM         Zestar Study Consent Visit    Study description: ECG and PPG Study: Zestar Study      Note time seated: 1245     Marilee Weston a 59 y.o. female , was seen in [facility] today to discuss participation in the Zestar study.   The consent discussion began on 1/21/20.  Please refer to phone call note from Nicolasa Wilburn for more details.  The consent form was reviewed with the patient.     The review of the study included:    Study purpose     Conflict of interest    Device description      Study visits    Risks of participation    Benefits (if any)    Alternatives    Voluntary participation    Confidentiality     Compensation/costs of participation    Study stipends    Injury and legal rights    The subject was provided time to review the consent form and consider participation. her questions were answered to her satisfaction.   The patient has voluntarily agreed to participate in the above noted study.     The consent form version 25 Nov 2019 and HIPAA form version 11 June 2019 was signed 01/22/20 at 1256    The subject was provided with a copy of the consent form and HIPAA. A copy of the signed forms was forwarded to medical records.    No study procedures were done prior to Marilee K Trista providing informed consent.     Sierra Behr-Holewinski    Subject Restrictions During Study -Confirmed with Subject prior to any study procedures completed    Restrictions on jewelry, recreational drugs, caffeine, and exercise few days prior and during study.   1. Subjects should not consume excessive amount of caffeine (6 or more 8-oz cups of  "coffee, or more than 570 mg of caffeine from energy drinks, pills or similar substance) during their participation in the study.   2. Subjects should not consume excessive amount of alcohol for the duration of their participation in the study. A typical moderate amount is allowed during stage 3.   3. Subjects should not take any recreational drugs (including, but not limited to methamphetamines, cocaine, opioids, cannabis, LSD) for the duration of their participation in the study.   4. Subjects should not wear underwire bra or jewelry during the in-lab study (to not interfere with electrode placement and ECG data recordings).   5. Subject will not be permitted to have their cell phone or any electronic recording device on or with them during the in-lab test session(s).   6. Subjects under 22 years old will not be permitted to take ECG recordings through the ECG nikki on the wrist-worn devices.     For study stage 3 only   1. Subjects should only do high intensity exercise (e.g. sprinting, heavy lifting, etc.) in the morning upon awakening or else not at all   2. Subjects should abstain from swimming during the time of the study   3. Subjects should only shower in the morning upon awakening (or else not at all)   4. Female subjects are strongly suggested to wear non-underwire bras throughout this stage of the study     Study Data collections   Vitals  (TPBP)     Vitals:    01/22/20 1256 01/22/20 1304 01/22/20 1305   BP: 149/86 128/78 128/78   Patient Site: Left Arm Left Arm Left Arm   Patient Position: Sitting Sitting Sitting   Cuff Size: Adult Regular Adult Regular Adult Regular   Pulse: (!) 54 (!) 55 (!) 53   Resp:   12   Temp: 97.5  F (36.4  C)     TempSrc: Oral     Weight:   149 lb 8 oz (67.8 kg)   Height:   5' 8\" (1.727 m)      VS taken after 5 min rest     MAP 1    105  MAP 2      95   MAP 3             95          Body mass index is 22.73 kg/m .  female  1960  59 y.o.    Time subject placed supine: " 1307    Ethnicity   []   or    [x]  Not  or   Race   []   or    []    []  Black or   []   or Other   [x]  White  Physical Activity Level  per subject report:   []  0- Extremely Inactive []  1- Sedentary []  2- Moderately Active  [x]  3- Vigorously Active []  4- Extremely Active  Trained Athlete   [] Yes  [x] No     Melendez's' Skin type   [] Type 1 [] Type 2 [x] Type 3    [] Type 4 [] Type 5 [] Type 6    Subject participated in previous ECG study at [facility]: [] Yes    [x] No    Past Medical History:   Diagnosis Date   ? Atrial fibrillation (H) 2010   ? Fibrocystic breast    ? Osteopenia    ? Postsurgical hypothyroidism        HISTORY OF HEART RHYTHM ABNORMALITIES (check only group subject is 'randomized[' to-only 1)  []  Group 1: History of paroxysmal atrial fibrillation (no excluded medications)  []  Group 2: History of paroxysmal atrial fibrillation (on excluded medications)    []  Group 3: History of high-rate atrial fibrillation   [x]  Group 4: History of atrial fibrillation with rate control medications    []  Group 5: Permanent/persistent atrial fibrillation    []  Group 6: history of atrial flutter  []  Group 7: Frequent PVCs  []  Group 8: Frequent PACs  []  Group 9: BBB (left or right)  []  Group 10: History of 2nd Heart Block (any type)  []  Group 11: History of bigeminy, trigeminy, and/or quadgeminy  []  Group 12: History of tachycardia     Special interest allergies: active allergic skin reactions  Allergies   Allergen Reactions   ? Penicillins Rash     She was on cough syrup at the same time and suspects rash was actually due to cough syrup.       Current Outpatient Medications:   ?  acetaminophen (TYLENOL) 500 MG tablet, Take 1-2 tablets (500-1,000 mg total) by mouth every 6 (six) hours as needed for pain., Disp: , Rfl: 0  ?  calcium citrate/vitamin D3 (CITRACAL + D ORAL), Take 2 tablets by  "mouth daily. , Disp: , Rfl:   ?  celecoxib (CELEBREX) 200 MG capsule, Take 1 capsule (200 mg total) by mouth daily., Disp: 90 capsule, Rfl: 3  ?  DULoxetine (CYMBALTA) 20 MG capsule, Take 1 capsule (20 mg total) by mouth daily., Disp: 30 capsule, Rfl: 2  ?  estradiol (ESTRACE) 0.01 % (0.1 mg/gram) vaginal cream, Insert 2 g into the vagina 2 (two) times a week., Disp: , Rfl:   ?  estradiol (VAGIFEM) 10 mcg Tab, Insert 10 mcg into the vagina 2 (two) times a week., Disp: , Rfl:   ?  estradiol-levonorgestrel (CLIMARAPRO) 0.045-0.015 mg/24 hr, Place 1 patch on the skin once a week., Disp: , Rfl:   ?  FLAXSEED ORAL, Take 1 tablet by mouth daily. , Disp: , Rfl:   ?  levothyroxine (SYNTHROID, LEVOTHROID) 100 MCG tablet, Take 100 mcg by mouth daily., Disp: , Rfl:   ?  metoprolol succinate (TOPROL-XL) 50 MG 24 hr tablet, Take 1.5 tablets (75 mg total) by mouth daily., Disp: , Rfl:   ?  OMEGA-3S-DHA-EPA-FISH OIL-D3 ORAL, Take 2,400 Units by mouth., Disp: , Rfl:   ?  polyethylene glycol (MIRALAX) 17 gram packet, Take 17 g by mouth daily., Disp: , Rfl:       10-sec 12-lead ECG & 30-sec 12-lead ECG rhythm strip done; reviewed by & PE done by Sarah Linder   Subject Questionnaire    OCCUPATION: Self Employed   Predominately works outdoors  [] Yes    [x] No      Hours/week spent outdoors (total, not only for work): 7    Frequently participates in \"hand intensive\" activities [] Yes [x] No  Caffeine  []  Never  [] Occasionally        []  Daily (1 cup/day)     [x]  Daily (>1 cup/day)    Alcohol   []  Never  [] Light (drink or 2 occasionally) [x]  Moderate (a drink or 2 almost daily)   []  Occasional-heavy (more than a few drinks <2x / month)  [] Heavy (more than a few drinks >2x / month)    Tobacco/nicotine  [x]  Never  [] Rarely  []  Frequently/ Daily     Mattress Information    Mattress type:  []  Memory foam [] Gel  [] Innerspring (coil)  [] Airbed  [] Waterbed [] Sh[place]ibuton  [] Hybrid  [] No mattress  [x] Other (comment): Sleep " "Number   Mattress foundation   [] Mattress on floor/ground    [] Mattress on foundation/box spring on floor/ground  [x] Mattress on foundation/box spring on bed frame  [] Mattress on tatami on floor/ground  [] Other (comment):    Mattress topper   [x] No mattress topper  [] Pillow top  [] Foam top - flat style  [] Foam top - \"egg crate\" style  [] Other (comment):    Co-sleeper    [x]  Yes  []  No    CPAP use   [] Yes  [x] No      Dominant hand [] left  [x] right [] ambidextrous  Preferred Wrist to wear band on   [x]  left   []  right   Were screening day & study day: [x]  same [] different   Same: wrist circumference:      144   mm   Device wearing wrist skin fold thickness:       3.8  mm  Wrist Band Size:     [x]  Flush Fit S/M  []  Non-Flush Fit S/M   []  Flush Fit M/L  []  Non-Flush Fit M/L  []  Flush Fit XL  []  Non-Flush Fit XL    Preferred/natural band notch: 2  Secure band notch: 2  Crown orientation:  []  left   [x]  right  Device wearing wrist hairiness:     [x]  Light []  Medium       []  Heavy  Spectophotometer   L A B   Reading #1  65.91  8.75  18.56   Reading #2  66.98  9.40  18.43   Reading #3  66.40  10.02  18.74     Pregnancy test    [] WOCBP (age <55 yrs, no tubal ligation, no hysterectomy)    [x] n/a male or female not child bearing potential  For Stage 2: subject will use exercise bike for exercise portion of the study  Room Temperature: 20 C   Laptop ID: [id number]  CS Cam ID:[id number]  Device Set ID:[id number]  Wrist Device ID:[id number]  Subject has now completed their in-house participation in the Zestar study. Subject will complete Stage 3 at home for the next 3 days and return the equipment on 1/27/20 [device return date].  Sierra Behr-Holewinski "

## 2021-06-28 NOTE — PROGRESS NOTES
Progress Notes by Behr-Holewinski, Sierra, RN at 1/22/2020 12:30 PM     Author: Behr-Holewinski, Sierra, RN Service: -- Author Type: Patient Access    Filed: 1/23/2020  9:32 AM Encounter Date: 1/22/2020 Status: Signed    : Justin Rich MD (Physician)    Related Notes: Original Note by Behr-Holewinski, Sierra, RN (Registered Nurse) filed at 1/22/2020  2:53 PM           [facility] Study Inclusion / Exclusion Criteria  Protocol Version 4.0 ([id number])    Inclusion Criteria    Yes No Criteria # Subject must meet all inclusion criteria:      [x]    []  1 At least 18 years old for NSR and 22 years old for Non-NSR. Inclusive for both cohorts, at time of screening, with no upper limit on age.        [x]      []  2 To be enrolled as a non-NSR subject the volunteer must have one of the following conditions: Permanent/Persistent AF, Hx of paroxysmal AF, Hx of High-rate AF, AF + rate control medication, Hx Atrial Flutter, PVC burden >1%, Frequent PACs, Hx BBB, Hx 2nd degree block (any type), Hx Bigeminy/Trigeminy/Quadgeminy, Hx Tachycardia. For subjects with any of the following diagnoses, the condition must be present at the time of screening:   ? Permanent/persistent AF  ? PVC [place]  ? Frequent PACs   Note: If not present at screening, subjects may not be enrolled as a non-NSR subject or NSR subject.         3  [x] N/A HE site For Subjects to be enrolled as NSR they must be in NSR at the time of screening as determined by the investigator. For subjects ?65 years old with PVC burden of ?1% or infrequent PACs (<3 ectopic beats in 30 seconds), they may qualify as individuals in the NSR cohort as long as they don't have a medical history/diagnosis of significant ectopic burden. For subjects ? 66 years old with PVC burden > 1% but ?10%, they may qualify as individuals in the NSR cohort as long as they don't have a medical history/diagnosis of significant ectopic burden.    [x]  []  4 Able to read and understand a written  ICF    [x]  []  5 Willing and able to participate in the study procedures and comply with its restrictions    [x]  []  6 Able to communicate effectively with study staff as well as understand and follow directions    All must be Yes    Exclusion Criteria-all must be no  Yes No Criteria # Subject must not meet any exclusion criteria:    []  [x]  1 Physical disability that prevents safe and adequate testing.   []  [x]  2 Pregnant women or women planning to become pregnant   []  [x]  3 Any acute illness or condition that may interfere with study procedures (e.g. cough, fever, sore throat, headache, sunburn, etc.)   []  [x]  4 Clinically significant hand tremors, as judged by the Investigator   []  [x]  5 Resting hypertension with systolic blood pressure ?161 mmHg or diastolic blood pressure ?101 mmHg (if at least 2 of 3 measurements meet this criteria)   []  [x]  6 Subjects with a pacemaker or an automated implantable cardioverter- defibrillator (AICD)   []  [x]  7 Acute myocardial infarction (MI) within 90 days from the screening visit   []  [x]  8 Other cardiovascular disease that increases the risk to the subject or would render the data uninterpretable in the opinion of the Investigator (e.g., recent or ongoing unstable angina, significant valvular heart disease or chronic heart failure, myocarditis or pericarditis)    []  [x]  9 Acute pulmonary embolism, pulmonary infarction, or deep vein thrombosis within 90 days from the screening visit    []  [x]  10 Stroke or transient ischemic attack within 90 days from the screening visit    []  [x]  11 Known untreated medical conditions as determined by the Investigator, such as but not limited to significant anemia, important electrolyte imbalance and untreated or uncontrolled thyroid disease.    []  [x]  12 Any history of wrist surgery with scarring in the area of the sensor location on the wrist where the subject will be wearing the watch;    []  [x]  13 Open wound(s) on  the wrist and forearm where the subject will be wearing the watch    []  [x]  14 Severe symptomatic (or active) overly dry/injured skin, skin disorders, or allergic skin reactions such as eczema, rosacea, impetigo, dermatomyositis or allergic contact dermatitis on wrist and locations where the electrodes will be placed (e.g. chest, forearms, stomach), as determined by the investigator.    []  [x]  15 Tattoos, scars or moles in the area of the sensor location on the wrist where the subject will be wearing the watch    []  [x]  16 Device wearing Wrist circumference ? 129 mm or ? 246 mm    []  [x]  17 Known significant sensitivity to medical adhesives or isopropyl alcohol (for ECG electrode placement)    []  [x]  18 Known allergy or sensitivity to fluorocarbon-based synthetic rubber, such as contact dermatitis with fluoroelastomer bands primarily used in wrist worn fitness devices    []  [x]  19 Subjects with any Medical History, Physical exam, vital sign or any other study procedure finding/assessment that in the opinion of the investigator could compromise subject safety during study participation or interfere with the study integrity and/or the accurate assessment of the study objectives    []  [x]  20 Subject works for a company that develops or sells medical and/or fitness devices (e.g., ECG monitors, wearable fitness bands, sleep monitors, etc.) or are technology journalists (e.g., professional bloggers, TV, magazine, newspaper reporters, etc.)    []  [x]  21 Weight > 181 kg for subjects using the stationary bike and/or treadmill. Weight of ?138 kg for NSR subjects.    []  [x]  22 Subject is employed in shift work, or otherwise does not maintain a reasonably consistent day/night schedule (e.g. Subjects who go to bed after 4am).    []  [x]  23 Overnight travel planned during data collection nights    []  [x]  24 Non-NSR subjects should not have partaken in strenuous physical activity within 12 hours prior to  screening    []  [x]  25 Non-NSR subjects with Atrial fibrillation categories: Subjects taking Class 1 or Class 3 antiarrhythmic agents such as the following may not take part in any stage of the study: amiodarone, sotalol, dronedarone, ibutilide, dofetilide, propafenone, quinidine, procainamide, disopyramide, flecainide (Subjects taking class 2, 4 or 5 antiarrhythmic agents may take part the study).    []  [x]  26 Subjects who have both a history of paroxysmal AF and a Melendez skin type measurement of VI    []  [x]  27 Subjects who have missing index fingers on both hands      Subject has met all inclusion criteria and no exclusion criteria have been met.   Subject is ready to fully enrolled in the [facility] study.    Sierra Behr-Holewinski

## 2021-06-28 NOTE — PROGRESS NOTES
Progress Notes by Melanie Wayne at 1/27/2020 11:01 AM     Author: Melanie Wayne Service: -- Author Type: Patient Access    Filed: 1/27/2020 11:01 AM Encounter Date: 1/27/2020 Status: Signed    : Melanie Wayne (Patient Access)         [facility] Study Device Return    Marilee Weston returned all the devices for the [facility] study.  Marilee Weston denies medication changes or adverse events since last visit.    Marilee K Trista has now completed their participation in the [facility] study.   Thank you for your gift of participation.    Melanie Wayne

## 2021-07-03 NOTE — ADDENDUM NOTE
Addendum Note by Dalila Correia at 3/17/2020  8:00 AM     Author: Dalila Correia Service: -- Author Type:     Filed: 3/19/2020  6:26 PM Encounter Date: 3/17/2020 Status: Signed    : Dalila Correia ()    Addended by: DALILA CORREIA on: 3/19/2020 06:26 PM        Modules accepted: Orders

## 2021-07-06 VITALS
HEART RATE: 60 BPM | DIASTOLIC BLOOD PRESSURE: 74 MMHG | OXYGEN SATURATION: 97 % | WEIGHT: 161.3 LBS | BODY MASS INDEX: 24.44 KG/M2 | HEIGHT: 68 IN | SYSTOLIC BLOOD PRESSURE: 124 MMHG

## 2021-07-13 ENCOUNTER — RECORDS - HEALTHEAST (OUTPATIENT)
Dept: ADMINISTRATIVE | Facility: CLINIC | Age: 61
End: 2021-07-13

## 2021-07-14 ENCOUNTER — OFFICE VISIT (OUTPATIENT)
Dept: PHYSICAL MEDICINE AND REHAB | Facility: CLINIC | Age: 61
End: 2021-07-14
Payer: COMMERCIAL

## 2021-07-14 VITALS
HEIGHT: 68 IN | HEART RATE: 62 BPM | BODY MASS INDEX: 24.4 KG/M2 | WEIGHT: 161 LBS | DIASTOLIC BLOOD PRESSURE: 74 MMHG | SYSTOLIC BLOOD PRESSURE: 128 MMHG

## 2021-07-14 DIAGNOSIS — M53.3 SACRAL PAIN: ICD-10-CM

## 2021-07-14 DIAGNOSIS — M47.816 ARTHROPATHY OF LUMBAR FACET JOINT: ICD-10-CM

## 2021-07-14 DIAGNOSIS — M54.50 LUMBAR SPINE PAIN: Primary | ICD-10-CM

## 2021-07-14 DIAGNOSIS — M79.18 MYOFASCIAL PAIN: ICD-10-CM

## 2021-07-14 DIAGNOSIS — M25.651 DECREASED RANGE OF MOTION OF BOTH HIPS: ICD-10-CM

## 2021-07-14 DIAGNOSIS — M51.369 DDD (DEGENERATIVE DISC DISEASE), LUMBAR: ICD-10-CM

## 2021-07-14 DIAGNOSIS — M25.652 DECREASED RANGE OF MOTION OF BOTH HIPS: ICD-10-CM

## 2021-07-14 PROCEDURE — 99214 OFFICE O/P EST MOD 30 MIN: CPT | Performed by: PHYSICAL MEDICINE & REHABILITATION

## 2021-07-14 RX ORDER — CELECOXIB 100 MG/1
CAPSULE ORAL
COMMUNITY
Start: 2021-03-19 | End: 2021-07-16

## 2021-07-14 ASSESSMENT — PAIN SCALES - GENERAL: PAINLEVEL: MODERATE PAIN (5)

## 2021-07-14 ASSESSMENT — MIFFLIN-ST. JEOR: SCORE: 1333.79

## 2021-07-14 NOTE — PROGRESS NOTES
Assessment/Plan:      Diagnoses and all orders for this visit:    Lumbar spine pain  -     Physical Therapy Referral; Future    Decreased range of motion of both hips  -     Physical Therapy Referral; Future    Sacral pain  -     Physical Therapy Referral; Future    Arthropathy of lumbar facet joint  -     Physical Therapy Referral; Future    DDD (degenerative disc disease), lumbar  -     Physical Therapy Referral; Future    Myofascial pain         Assessment: Pleasant 61 year old female with past medical history significant for depression and thyroid disorder, A. fib with:     1.  Increased pain over the sacrum and PSIS and gluteal region.  Likely to accommodation of SI pain spondylolisthesis myofascial pain.  Likely mechanical also related to decreased range of motion of the hips.  She is having some increased arthralgias in general with stopping Celebrex but this is due to reflux issues.    2. Improved  right lower extremity radicular pain to the knee.    Severe right foraminal stenosis L4-5 and moderate left L5-S1.  L4-5 mild spondylolisthesis..  This is following a lumbar injection.      .     3.  Myofascial pain gluteal region greater trochanters.      Discussion:    1.  Discussed the diagnosis and treatment options.  Reviewed MRI.  Discussed options of medication changes along with therapy and injections.    2 preserved physical therapy for hip range of motion SI range of motion stabilization.  This will be sent to [place].  If the wait is too long she will also try to get into [facility] closer to home.    3.  Trial Voltaren gel over-the-counter in place of Celebrex for her arthralgias particularly over the sacrum.    4.  Continue to work with her PCP for reflux symptoms.    5.  Continue with duloxetine    6.  Follow-up with me in 6 weeks can consider SI joint injections versus medial branch blocks.    It was our pleasure caring for your patient today, if there any questions or concerns please do not hesitate to  contact us.      Subjective:   Patient ID: Marilee Weston is a 61 year old female.    History of Present Illness: Patient presents for follow-up of low back pain.  Has worsening pain in general.  Had to stop Celebrex 1 month ago due to reflux type issues on omeprazole per PCP also stopped alcohol.  That pain resolved.  Had increased pain in general and took Celebrex 3 days this week having some GI issues seeing PCP later this week.    During this time she had increased pain in general is her knees and back.  Her most significant pain low back at the PSIS and mid pole of the sacrum.  Worse first thing in the morning.  Better with Celebrex along with movement.  Pain is an 8/10 at worst 5/10 today 3/10 at best.  Does improved with Celebrex.    Previous right lower extremity radicular pain has dramatically improved following right L4-5 transforaminal epidural steroid injection in January of this year.  Has not had recent physical therapy.  Continues to take duloxetine 60 mg daily.      Imaging: MRI lumbar spine from December 2020 personally reviewed.  Moderate disc height loss L4-5 mild disc height loss L5-S1 moderate left foraminal stenosis L5-S1 severe right foraminal stenosis L4-5.Mild grade 1 spondylolisthesis L4-5.  With broad-based disc bulge.    Review of Systems: Pertinent positives: Sense of weakness in the low back.  Pain worse at night and first thing in the morning.  Denies numbness or tingling in the legs, bowel or bladder incontinence, falls, headaches, difficulty with coordination fevers or weight loss.           Past Medical History:   Diagnosis Date     Atrial fibrillation (H) 2010     Depression      Fibrocystic breast      Osteopenia      Osteopenia      Paroxysmal atrial fibrillation (H)      Postsurgical hypothyroidism        The following portions of the patient's history were reviewed and updated as appropriate: allergies, current medications, past family history, past medical history, past social  "history, past surgical history and problem list.           Objective:   Physical Exam:    /74 (BP Location: Left arm, Patient Position: Sitting, Cuff Size: Adult Regular)   Pulse 62   Ht 5' 8\" (1.727 m)   Wt 161 lb (73 kg)   BMI 24.48 kg/m    Body mass index is 24.48 kg/m .      General: Alert and oriented with normal affect. Attention, knowledge, memory, and language are intact. No acute distress.   Eyes: Sclerae are clear.  Respirations: Unlabored. CV: No lower extremity edema.  Skin: No rashes seen.    Gait:  Nonantalgic  Tenderness over the sacrum today S2 segment.  Some tenderness of the SI joint and PSIS.  Negative thigh thrust bilaterally, Gaenslen's bilaterally, SHI bilaterally although decreased range of motion of the hips and external rotation with some hip pain with SHI.  Sensation is intact to light touch throughout the   lower extremities.  Reflexes are  2+ patellar and Achilles with downgoing toes.    Manual muscle testing reveals:  Right /Left out of 5     5/5 hip flexors  5/5 knee flexors  5/5 knee extensors  5/5 ankle plantar flexors  5/5 ankle dorsiflexors  5/5  EHL    "

## 2021-07-14 NOTE — LETTER
7/14/2021         RE: Marilee Weston  [address]  [facility] [address]        Dear Colleague,    Thank you for referring your patient, Marilee Weston, to the [facility] [place]. Please see a copy of my visit note below.    Assessment/Plan:      Diagnoses and all orders for this visit:    Lumbar spine pain  -     Physical Therapy Referral; Future    Decreased range of motion of both hips  -     Physical Therapy Referral; Future    Sacral pain  -     Physical Therapy Referral; Future    Arthropathy of lumbar facet joint  -     Physical Therapy Referral; Future    DDD (degenerative disc disease), lumbar  -     Physical Therapy Referral; Future    Myofascial pain         Assessment: Pleasant 61 year old female with past medical history significant for depression and thyroid disorder, A. fib with:     1.  Increased pain over the sacrum and PSIS and gluteal region.  Likely to accommodation of SI pain spondylolisthesis myofascial pain.  Likely mechanical also related to decreased range of motion of the hips.  She is having some increased arthralgias in general with stopping Celebrex but this is due to reflux issues.    2. Improved  right lower extremity radicular pain to the knee.    Severe right foraminal stenosis L4-5 and moderate left L5-S1.  L4-5 mild spondylolisthesis..  This is following a lumbar injection.      .     3.  Myofascial pain gluteal region greater trochanters.      Discussion:    1.  Discussed the diagnosis and treatment options.  Reviewed MRI.  Discussed options of medication changes along with therapy and injections.    2 preserved physical therapy for hip range of motion SI range of motion stabilization.  This will be sent to [place].  If the wait is too long she will also try to get into [facility] closer to home.    3.  Trial Voltaren gel over-the-counter in place of Celebrex for her arthralgias particularly over the sacrum.    4.  Continue to work with her PCP for reflux  symptoms.    5.  Continue with duloxetine    6.  Follow-up with me in 6 weeks can consider SI joint injections versus medial branch blocks.    It was our pleasure caring for your patient today, if there any questions or concerns please do not hesitate to contact us.      Subjective:   Patient ID: Marilee Weston is a 61 year old female.    History of Present Illness: Patient presents for follow-up of low back pain.  Has worsening pain in general.  Had to stop Celebrex 1 month ago due to reflux type issues on omeprazole per PCP also stopped alcohol.  That pain resolved.  Had increased pain in general and took Celebrex 3 days this week having some GI issues seeing PCP later this week.    During this time she had increased pain in general is her knees and back.  Her most significant pain low back at the PSIS and mid pole of the sacrum.  Worse first thing in the morning.  Better with Celebrex along with movement.  Pain is an 8/10 at worst 5/10 today 3/10 at best.  Does improved with Celebrex.    Previous right lower extremity radicular pain has dramatically improved following right L4-5 transforaminal epidural steroid injection in January of this year.  Has not had recent physical therapy.  Continues to take duloxetine 60 mg daily.      Imaging: MRI lumbar spine from December 2020 personally reviewed.  Moderate disc height loss L4-5 mild disc height loss L5-S1 moderate left foraminal stenosis L5-S1 severe right foraminal stenosis L4-5.Mild grade 1 spondylolisthesis L4-5.  With broad-based disc bulge.    Review of Systems: Pertinent positives: Sense of weakness in the low back.  Pain worse at night and first thing in the morning.  Denies numbness or tingling in the legs, bowel or bladder incontinence, falls, headaches, difficulty with coordination fevers or weight loss.           Past Medical History:   Diagnosis Date     Atrial fibrillation (H) 2010     Depression      Fibrocystic breast      Osteopenia      Osteopenia   "    Paroxysmal atrial fibrillation (H)      Postsurgical hypothyroidism        The following portions of the patient's history were reviewed and updated as appropriate: allergies, current medications, past family history, past medical history, past social history, past surgical history and problem list.           Objective:   Physical Exam:    /74 (BP Location: Left arm, Patient Position: Sitting, Cuff Size: Adult Regular)   Pulse 62   Ht 5' 8\" (1.727 m)   Wt 161 lb (73 kg)   BMI 24.48 kg/m    Body mass index is 24.48 kg/m .      General: Alert and oriented with normal affect. Attention, knowledge, memory, and language are intact. No acute distress.   Eyes: Sclerae are clear.  Respirations: Unlabored. CV: No lower extremity edema.  Skin: No rashes seen.    Gait:  Nonantalgic  Tenderness over the sacrum today S2 segment.  Some tenderness of the SI joint and PSIS.  Negative thigh thrust bilaterally, Gaenslen's bilaterally, SHI bilaterally although decreased range of motion of the hips and external rotation with some hip pain with SHI.  Sensation is intact to light touch throughout the   lower extremities.  Reflexes are  2+ patellar and Achilles with downgoing toes.    Manual muscle testing reveals:  Right /Left out of 5     5/5 hip flexors  5/5 knee flexors  5/5 knee extensors  5/5 ankle plantar flexors  5/5 ankle dorsiflexors  5/5  EHL        Again, thank you for allowing me to participate in the care of your patient.        Sincerely,        David Malagon, DO    "

## 2021-07-14 NOTE — PATIENT INSTRUCTIONS
1. A physical therapy order was provided for you today.  You can schedule physical therapy before you leave today or will be contacted by physical therapy.  If nobody contacts you within 3 to 5 days, please contact the clinic at [phone].  It will be very important for you to do your physical therapy exercises on a regular basis to decrease your pain and prevent future pain flares.    2. Please try Voltaren gel OTC to painful areas in place of Celebrex.  Work with Dr Vora on you reflux isuues

## 2021-07-16 ENCOUNTER — HOSPITAL ENCOUNTER (OUTPATIENT)
Dept: PHYSICAL THERAPY | Facility: REHABILITATION | Age: 61
End: 2021-07-16
Attending: PHYSICAL MEDICINE & REHABILITATION
Payer: COMMERCIAL

## 2021-07-16 ENCOUNTER — OFFICE VISIT (OUTPATIENT)
Dept: INTERNAL MEDICINE | Facility: CLINIC | Age: 61
End: 2021-07-16
Payer: COMMERCIAL

## 2021-07-16 VITALS
SYSTOLIC BLOOD PRESSURE: 114 MMHG | WEIGHT: 160.4 LBS | BODY MASS INDEX: 24.31 KG/M2 | RESPIRATION RATE: 98 BRPM | HEART RATE: 68 BPM | HEIGHT: 68 IN | DIASTOLIC BLOOD PRESSURE: 74 MMHG

## 2021-07-16 DIAGNOSIS — M25.652 DECREASED RANGE OF MOTION OF BOTH HIPS: ICD-10-CM

## 2021-07-16 DIAGNOSIS — M51.369 DDD (DEGENERATIVE DISC DISEASE), LUMBAR: ICD-10-CM

## 2021-07-16 DIAGNOSIS — M54.50 LUMBAR SPINE PAIN: Primary | ICD-10-CM

## 2021-07-16 DIAGNOSIS — R10.11 POSTPRANDIAL ABDOMINAL PAIN IN RIGHT UPPER QUADRANT: Primary | ICD-10-CM

## 2021-07-16 DIAGNOSIS — M53.3 SACRAL PAIN: ICD-10-CM

## 2021-07-16 DIAGNOSIS — M47.816 OSTEOARTHRITIS OF LUMBAR SPINE, UNSPECIFIED SPINAL OSTEOARTHRITIS COMPLICATION STATUS: ICD-10-CM

## 2021-07-16 DIAGNOSIS — M62.81 GENERALIZED MUSCLE WEAKNESS: ICD-10-CM

## 2021-07-16 DIAGNOSIS — M47.816 ARTHROPATHY OF LUMBAR FACET JOINT: ICD-10-CM

## 2021-07-16 DIAGNOSIS — E89.0 POSTSURGICAL HYPOTHYROIDISM: ICD-10-CM

## 2021-07-16 DIAGNOSIS — R10.13 DYSPEPSIA: ICD-10-CM

## 2021-07-16 DIAGNOSIS — M25.651 DECREASED RANGE OF MOTION OF BOTH HIPS: ICD-10-CM

## 2021-07-16 PROBLEM — M85.80 OSTEOPENIA, UNSPECIFIED LOCATION: Status: ACTIVE | Noted: 2021-07-16

## 2021-07-16 PROBLEM — Z98.890 H/O THYROIDECTOMY: Status: ACTIVE | Noted: 2019-02-13

## 2021-07-16 PROBLEM — Z90.89 H/O THYROIDECTOMY: Status: ACTIVE | Noted: 2019-02-13

## 2021-07-16 LAB — TSH SERPL DL<=0.005 MIU/L-ACNC: 0.52 UIU/ML (ref 0.3–5)

## 2021-07-16 PROCEDURE — 97161 PT EVAL LOW COMPLEX 20 MIN: CPT | Mod: GP | Performed by: PHYSICAL THERAPIST

## 2021-07-16 PROCEDURE — 99214 OFFICE O/P EST MOD 30 MIN: CPT | Performed by: INTERNAL MEDICINE

## 2021-07-16 PROCEDURE — 84443 ASSAY THYROID STIM HORMONE: CPT | Performed by: INTERNAL MEDICINE

## 2021-07-16 PROCEDURE — 97140 MANUAL THERAPY 1/> REGIONS: CPT | Mod: GP | Performed by: PHYSICAL THERAPIST

## 2021-07-16 PROCEDURE — 97110 THERAPEUTIC EXERCISES: CPT | Mod: GP | Performed by: PHYSICAL THERAPIST

## 2021-07-16 PROCEDURE — 36415 COLL VENOUS BLD VENIPUNCTURE: CPT | Performed by: INTERNAL MEDICINE

## 2021-07-16 RX ORDER — DULOXETIN HYDROCHLORIDE 30 MG/1
60 CAPSULE, DELAYED RELEASE ORAL DAILY
COMMUNITY
Start: 2021-06-21 | End: 2021-10-06

## 2021-07-16 RX ORDER — LEVOTHYROXINE SODIUM 100 UG/1
100 TABLET ORAL DAILY
Qty: 90 TABLET | Refills: 3 | Status: SHIPPED | OUTPATIENT
Start: 2021-07-16 | End: 2022-07-29

## 2021-07-16 ASSESSMENT — MIFFLIN-ST. JEOR: SCORE: 1331.07

## 2021-07-16 NOTE — PROGRESS NOTES
Office Visit - Follow Up   Marilee Weston   61 year old female    Date of Visit: 7/16/2021    Chief Complaint   Patient presents with     RECHECK        Assessment and Plan   1. Postprandial abdominal pain in right upper quadrant  She got significantly better with elimination of Celebrex, illumination of alcohol and initiation of omeprazole.  Return of symptoms with alcohol.  She plans at this point to minimize her eliminate alcohol.  We discussed role of alcohol and acid reflux in also some behavioral mechanical things she can do such as elevating her bed.  We discussed EGD and again deferred.  Her weight has been stable and no other symptoms.    2. Dyspepsia  As above continue omeprazole behavioral modification    3. Osteoarthritis of lumbar spine, unspecified spinal osteoarthritis complication status  I would like.  To consider retrial of Celebrex as it was quite effective.  She was started on 100 mg and see if this is sufficient    4. Postsurgical hypothyroidism  - levothyroxine (SYNTHROID) 100 MCG tablet; Take 1 tablet (100 mcg) by mouth daily  Dispense: 90 tablet; Refill: 3  - TSH with free T4 reflex; Future  - TSH with free T4 reflex    Return in about 3 months (around 10/16/2021).     History of Present Illness   This 61 year old old woman is in for follow-up.  When I saw her last she was having some postprandial abdominal pain and dyspepsia.  We discussed evaluation and she elected to stop Celebrex, eliminate alcohol and start on omeprazole.  This completely resolved her symptoms.  It was a little bit hard to stop Celebrex because of her osteoarthritic pains.  She started Celebrex back up and did okay but then had a couple glasses of wine last night and had return of symptoms.  She feels a little better today but not completely back to normal.    Review of Systems: A comprehensive review of systems was negative except as noted.     Medications, Allergies and Problem List   Reviewed, reconciled and  "updated  Post Discharge Medication Reconciliation Status:      Physical Exam   General Appearance:   Distress    /74 (BP Location: Right arm, Patient Position: Sitting, Cuff Size: Adult Regular)   Pulse 68   Resp (!) 98   Ht 1.727 m (5' 8\")   Wt 72.8 kg (160 lb 6.4 oz)   BMI 24.39 kg/m           Additional Information   Current Outpatient Medications   Medication Sig Dispense Refill     calcium carbonate 600 mg-vitamin D 400 units (CALTRATE) 600-400 MG-UNIT per tablet Take 1 tablet by mouth 2 times daily       Collagen Hydrolysate, Bovine, POWD        DULoxetine (CYMBALTA) 20 MG capsule Take 20 mg by mouth 2 times daily       DULoxetine (CYMBALTA) 30 MG capsule        estradiol (VAGIFEM) 10 MCG TABS Place 10 mcg vaginally daily       Estradiol-Levonorgestrel (CLIMARA PRO) 0.045-0.015 MG/DAY PTWK        fish oil-omega-3 fatty acids (FISH OIL) 1000 MG capsule Take 1 g by mouth 2 times daily.       Flaxseed, Linseed, (FLAXSEED OIL PO)        levothyroxine (SYNTHROID) 100 MCG tablet Take 1 tablet (100 mcg) by mouth daily 90 tablet 3     metoprolol succinate ER (TOPROL XL) 50 MG 24 hr tablet Take 1.5 tablets (75 mg) by mouth daily 135 tablet 3     metoprolol tartrate (LOPRESSOR) 25 MG tablet Take 1 tablet (25 mg) by mouth as needed (Take 25mg for AF episodes, if persists after 1-2 hours, take another 25mg.) 60 tablet 0     omeprazole (PRILOSEC) 20 MG DR capsule Take 20 mg by mouth       Allergies   Allergen Reactions     Pcn [Penicillins] Rash     Social History     Tobacco Use     Smoking status: Never Smoker     Smokeless tobacco: Never Used   Substance Use Topics     Alcohol use: Yes     Alcohol/week: 7.0 standard drinks     Comment: Occ.     Drug use: No       Review and/or order of clinical lab tests:  Review and/or order of radiology tests:  Review and/or order of medicine tests:  Discussion of test results with performing physician:  Decision to obtain old records and/or obtain history from someone " other than the patient:  Review and summarization of old records and/or obtaining history from someone other than the patient and.or discussion of case with another health care provider:  Independent visualization of image, tracing or specimen itself:    Time:      Chang Vora MD

## 2021-07-16 NOTE — PROGRESS NOTES
07/16/21 1100   General Information   Type of Visit Initial OP Ortho PT Evaluation   Start of Care Date 07/16/21   Referring Physician Dr Malagon   Patient/Family Goals Statement increase flexibility and strength to reduce pain    Orders Evaluate and Treat   Date of Order 07/14/21   Certification Required? No   Medical Diagnosis Lumbar Spine Pain, DDD of the Lumbar region    Surgical/Medical history reviewed Yes   Precautions/Limitations no known precautions/limitations   Weight-Bearing Status - LUE full weight-bearing   Weight-Bearing Status - RUE full weight-bearing   Weight-Bearing Status - LLE full weight-bearing   Weight-Bearing Status - RLE full weight-bearing       Present No   Body Part(s)   Body Part(s) Lumbar Spine/SI   Presentation and Etiology   Pertinent history of current problem (include personal factors and/or comorbidities that impact the POC) Patient reports she had [facility] in the past x 3 years and it was really good. she rides Horses for leisure exercise and her hips are bcoming more tight. she fell off the horse and fractured her scapula 3 years ago and went to [facility] for therapy for this. previously  she had fallen off of her horse 20 years before and she landed on her back in the place that is painful right now, had some therapy in [place] over the winter as well.    Impairments A. Pain;D. Decreased ROM;F. Decreased strength and endurance;E. Decreased flexibility   Functional Limitations perform activities of daily living;perform desired leisure / sports activities   Symptom Location central low back, lateral hips R > L    How/Where did it occur With a fall;From Degenerative Joint Disease   Onset date of current episode/exacerbation 12/28/20  (the pain has been on and off for more than 3 years ago)   Chronicity Recurrent   Pain rating (0-10 point scale) Best (/10);Worst (/10)   Best (/10) 5   Worst (/10) 7   Pain quality C. Aching;H. Other   Pain quality comment  grinding pain    Frequency of pain/symptoms A. Constant   Pain/symptoms are: Worse in the morning   Pain/symptoms exacerbated by E. Rest;I. Bending;K. Home tasks   Pain/symptoms eased by A. Sitting;E. Changing positions;H. Cold;I. OTC medication(s)  (injection on the R side of low back in jan 2021, )   Progression of symptoms since onset: Improved   Current / Previous Interventions   Diagnostic Tests: MRI   MRI Results Results   MRI results see chart for details    Prior Level of Function   Prior Level of Function-Mobility inependent    Prior Level of Function-ADLs independent    Current Level of Function   Current Community Support Family/friend caregiver   Patient role/employment history A. Employed   Employment Comments property management    Living environment House/townhome   Home/community accessibility stairs at home, with railing knees are some soreness   Current equipment-Gait/Locomotion None   Current equipment-ADL None   Fall Risk Screen   Fall screen completed by PT   Have you fallen 2 or more times in the past year? No   Have you fallen and had an injury in the past year? No   Is patient a fall risk? No   Abuse Screen (yes response referral indicated)   Feels Unsafe at Home or Work/School no   Feels Threatened by Someone no   Does Anyone Try to Keep You From Having Contact with Others or Doing Things Outside Your Home? no   Physical Signs of Abuse Present no   System Outcome Measures   Outcome Measures Low Back Pain (see Oswestry and Ronnie)  (36)   Lumbar Spine/SI Objective Findings   Integumentary intact   Posture decrease lumbar lordisis, slight forward head   Gait/Locomotion stiffness noted with hips on end range extension    Balance/Proprioception (Single Leg Stance) able to SL stance and hold >15 seconds    Flexion ROM to floor, tightness in low back and posterior leg   Extension ROM min dec pain at end range in mid low back    Right Side Bending ROM min dec no pain, stiffness   Left Side Bending  ROM min dec, no pain stiffness   Repeated Extension-Standing ROM no change    Repeated Flexion-Standing ROM no change    Lumbar ROM Comment rotation WNL feels stiff   Hip Screen WNL hip PROM in all planes, negative scour, FADIR and SHI bilaterally    Hip Flexion (L2) Strength 5- B   Hip Abduction Strength 4+/5 B    Hip Adduction Strength 5/5 B   Hip Extension Strength 4+/5 B    Knee Flexion Strength 5/5 B   Knee Extension (L3) Strength 5/5 B   Ankle Dorsiflexion (L4) Strength 5/5 B able to heel walk B   Ankle Plantar Flexion (S1) Strength able to toe walk bilaterally    Hamstring Flexibility min tightness noted in bilateral LEs   Hip Flexor Flexibility mod tightness on R, min+ on the left side    Quadricep Flexibility mod tightness on R, min+ on the left side    Obers Flexibility min tightness bilaterally    Piriformis Flexibility min tightness bilaterally    SLR neg   Chang Test positive    Ely Test neg   Crossover SLR neg   Slump Test neg   Segmental Mobility WNL   Sensation Testing WNL to light touch in B LEs   Palpation tender to B R > L ITB, greater trochanter and TFL, min tenderness to R > L SIJ, B paraspinals tender at L4-5-S1 area   Planned Therapy Interventions   Planned Therapy Interventions joint mobilization;manual therapy;neuromuscular re-education;ROM;strengthening;stretching   Planned Modality Interventions   Planned Modality Interventions Electrical stimulation;Cryotherapy;Hot packs;TENS;Traction;Ultrasound   Clinical Impression   Criteria for Skilled Therapeutic Interventions Met yes, treatment indicated   PT Diagnosis Chronic Low back Pain without sciatica    Influenced by the following impairments mucsle weakness, tightness in multiple hip muscles, joint mobility limitations    Functional limitations due to impairments walking in the AM, riding her horse and bending and lifting items like groceries and laundry    Clinical Presentation Stable/Uncomplicated   Clinical Decision Making (Complexity)  Low complexity   Therapy Frequency other (see comments)  (1-2 times per week )   Predicted Duration of Therapy Intervention (days/wks) 12 weeks for 8-12 total sessions    Risk & Benefits of therapy have been explained Yes   Patient, Family & other staff in agreement with plan of care Yes   Clinical Impression Comments Patient presents to therapy with decrease ROM, in the lumbar spine and hips, muscle weakness and hx of lumbar spondylilithesis at L4-5. Patient demonstrated mild weakness in R > L hip extension, abduction, flexion motions with tightness in the hip flexor, hamstring, piriformis and ITB R > L side. Patient is limited in walking i nthe AM, riding her horse pain free and bending to lift. Patient will benefit from skilled therpay in increase ROM, increase strength and joint mobility in order to improve overall functional mobility and activity tolerance.    Education Assessment   Preferred Learning Style Pictures/video;Demonstration;Listening   Barriers to Learning No barriers   ORTHO GOALS   PT Ortho Eval Goals 1;2;3   Ortho Goal 1   Goal Identifier HEP    Goal Description Patient will be I in HEP in order to maximize recovery and prevent reoccurrence in 6 weeks   Target Date 08/27/21   Ortho Goal 2   Goal Identifier Bending   Goal Description Patient will report increase ease in bending and lifting items >20# with pain no greater than 3/10 for increase ease in home chores and care for horse in 12 weeks   Target Date 10/08/21   Ortho Goal 3   Goal Identifier Walking   Goal Description Patient will report increased ease in walking in the AM with pain no greater than 3/10 for increase ease in ADLs and self cares in 12 weeks   Target Date 10/08/21   Total Evaluation Time   PT Eval, Low Complexity Minutes (93369) 30   Rosanna Fox, PT, DPT, CLKINJAL

## 2021-07-21 ENCOUNTER — RECORDS - HEALTHEAST (OUTPATIENT)
Dept: ADMINISTRATIVE | Facility: CLINIC | Age: 61
End: 2021-07-21

## 2021-07-27 ENCOUNTER — HOSPITAL ENCOUNTER (OUTPATIENT)
Dept: PHYSICAL THERAPY | Facility: REHABILITATION | Age: 61
End: 2021-07-27
Payer: COMMERCIAL

## 2021-07-27 DIAGNOSIS — M54.50 LUMBAR SPINE PAIN: Primary | ICD-10-CM

## 2021-07-27 DIAGNOSIS — M25.651 DECREASED RANGE OF MOTION OF BOTH HIPS: ICD-10-CM

## 2021-07-27 DIAGNOSIS — M25.652 DECREASED RANGE OF MOTION OF BOTH HIPS: ICD-10-CM

## 2021-07-27 DIAGNOSIS — M47.816 ARTHROPATHY OF LUMBAR FACET JOINT: ICD-10-CM

## 2021-07-27 DIAGNOSIS — M53.3 SACRAL PAIN: ICD-10-CM

## 2021-07-27 DIAGNOSIS — M62.81 GENERALIZED MUSCLE WEAKNESS: ICD-10-CM

## 2021-07-27 DIAGNOSIS — M51.369 DDD (DEGENERATIVE DISC DISEASE), LUMBAR: ICD-10-CM

## 2021-07-27 PROCEDURE — 97110 THERAPEUTIC EXERCISES: CPT | Mod: GP | Performed by: PHYSICAL THERAPY ASSISTANT

## 2021-07-29 DIAGNOSIS — M54.50 LUMBAR SPINE PAIN: Primary | ICD-10-CM

## 2021-07-29 RX ORDER — CELECOXIB 200 MG/1
CAPSULE ORAL
Qty: 30 CAPSULE | Refills: 3 | Status: SHIPPED | OUTPATIENT
Start: 2021-07-29 | End: 2021-12-16

## 2021-08-04 ENCOUNTER — HOSPITAL ENCOUNTER (OUTPATIENT)
Dept: PHYSICAL THERAPY | Facility: REHABILITATION | Age: 61
End: 2021-08-04
Payer: COMMERCIAL

## 2021-08-04 DIAGNOSIS — M25.652 DECREASED RANGE OF MOTION OF BOTH HIPS: ICD-10-CM

## 2021-08-04 DIAGNOSIS — M25.651 DECREASED RANGE OF MOTION OF BOTH HIPS: ICD-10-CM

## 2021-08-04 DIAGNOSIS — M51.369 DDD (DEGENERATIVE DISC DISEASE), LUMBAR: ICD-10-CM

## 2021-08-04 DIAGNOSIS — M47.816 ARTHROPATHY OF LUMBAR FACET JOINT: ICD-10-CM

## 2021-08-04 DIAGNOSIS — M62.81 GENERALIZED MUSCLE WEAKNESS: ICD-10-CM

## 2021-08-04 DIAGNOSIS — M54.50 LUMBAR SPINE PAIN: Primary | ICD-10-CM

## 2021-08-04 DIAGNOSIS — M53.3 SACRAL PAIN: ICD-10-CM

## 2021-08-04 PROCEDURE — 97140 MANUAL THERAPY 1/> REGIONS: CPT | Mod: GP | Performed by: PHYSICAL THERAPY ASSISTANT

## 2021-08-04 PROCEDURE — 97110 THERAPEUTIC EXERCISES: CPT | Mod: GP | Performed by: PHYSICAL THERAPY ASSISTANT

## 2021-08-13 DIAGNOSIS — I48.0 PAROXYSMAL ATRIAL FIBRILLATION (H): ICD-10-CM

## 2021-08-17 RX ORDER — METOPROLOL SUCCINATE 50 MG/1
75 TABLET, EXTENDED RELEASE ORAL DAILY
Qty: 135 TABLET | Refills: 0 | Status: SHIPPED | OUTPATIENT
Start: 2021-08-17 | End: 2021-10-27

## 2021-08-17 NOTE — TELEPHONE ENCOUNTER
METOPROLOL ER SUCCINATE 50MG TABS  Last Written Prescription Date:  7/16/2020  Last Fill Quantity: 135,   # refills: 3  Last Office Visit : 2/3/2020  Future Office visit:  None    Routing refill request to provider for review/approval because:  Refer to [facility].    We do not fill medications for [facility].            Flaquita Carlos RN  Central Triage Red Flags/Med Refills     28.7

## 2021-08-17 NOTE — TELEPHONE ENCOUNTER
[facility] Call Center    Phone Message    May a detailed message be left on voicemail: yes     Reason for Call: Other: Pt calling to check on status of this medication. She states she only has enough left to get her through today     Action Taken: Message routed to:  [facility] Cardio    Travel Screening: Not Applicable

## 2021-08-31 ENCOUNTER — HOSPITAL ENCOUNTER (OUTPATIENT)
Dept: PHYSICAL THERAPY | Facility: REHABILITATION | Age: 61
End: 2021-08-31
Payer: COMMERCIAL

## 2021-08-31 DIAGNOSIS — M47.816 ARTHROPATHY OF LUMBAR FACET JOINT: ICD-10-CM

## 2021-08-31 DIAGNOSIS — M54.50 LUMBAR SPINE PAIN: Primary | ICD-10-CM

## 2021-08-31 DIAGNOSIS — M62.81 GENERALIZED MUSCLE WEAKNESS: ICD-10-CM

## 2021-08-31 DIAGNOSIS — M51.369 DDD (DEGENERATIVE DISC DISEASE), LUMBAR: ICD-10-CM

## 2021-08-31 DIAGNOSIS — M25.651 DECREASED RANGE OF MOTION OF BOTH HIPS: ICD-10-CM

## 2021-08-31 DIAGNOSIS — M25.652 DECREASED RANGE OF MOTION OF BOTH HIPS: ICD-10-CM

## 2021-08-31 DIAGNOSIS — M53.3 SACRAL PAIN: ICD-10-CM

## 2021-08-31 PROCEDURE — 97110 THERAPEUTIC EXERCISES: CPT | Mod: GP | Performed by: PHYSICAL THERAPIST

## 2021-08-31 PROCEDURE — 97140 MANUAL THERAPY 1/> REGIONS: CPT | Mod: GP | Performed by: PHYSICAL THERAPIST

## 2021-09-01 ENCOUNTER — OFFICE VISIT (OUTPATIENT)
Dept: PHYSICAL MEDICINE AND REHAB | Facility: CLINIC | Age: 61
End: 2021-09-01
Payer: COMMERCIAL

## 2021-09-01 VITALS
DIASTOLIC BLOOD PRESSURE: 78 MMHG | SYSTOLIC BLOOD PRESSURE: 168 MMHG | HEART RATE: 61 BPM | HEIGHT: 68 IN | BODY MASS INDEX: 24.25 KG/M2 | WEIGHT: 160 LBS

## 2021-09-01 DIAGNOSIS — M53.3 SACROILIAC JOINT PAIN: ICD-10-CM

## 2021-09-01 DIAGNOSIS — M79.18 MYOFASCIAL PAIN: ICD-10-CM

## 2021-09-01 DIAGNOSIS — M43.16 SPONDYLOLISTHESIS OF LUMBAR REGION: ICD-10-CM

## 2021-09-01 DIAGNOSIS — M47.816 ARTHROPATHY OF LUMBAR FACET JOINT: ICD-10-CM

## 2021-09-01 DIAGNOSIS — M53.3 SACRAL PAIN: Primary | ICD-10-CM

## 2021-09-01 PROCEDURE — 99214 OFFICE O/P EST MOD 30 MIN: CPT | Performed by: PHYSICAL MEDICINE & REHABILITATION

## 2021-09-01 ASSESSMENT — MIFFLIN-ST. JEOR: SCORE: 1329.26

## 2021-09-01 ASSESSMENT — PAIN SCALES - GENERAL: PAINLEVEL: MODERATE PAIN (5)

## 2021-09-01 NOTE — LETTER
9/1/2021         RE: Marilee Weston  [address]  [facility] [address]        Dear Colleague,    Thank you for referring your patient, Marilee Weston, to the [facility] [place]. Please see a copy of my visit note below.    Assessment/Plan:      Diagnoses and all orders for this visit:    Sacral pain  -     PAIN Joint Injection Sacroiliac Joint Bilateral; Future    Sacroiliac joint pain  -     PAIN Joint Injection Sacroiliac Joint Bilateral; Future    Spondylolisthesis of lumbar region    Arthropathy of lumbar facet joint    Myofascial pain         Assessment: Pleasant 61 year old female with past medical history significant for depression and thyroid disorder, A. fib with:     1.   Persistent left greater than right sacral pain over the lower SI joints and GREGORIO region of the sacrum.  Previously was having gluteal and PSIS pain as well which has improved restarting Celebrex and physical therapy.  Her pain is likely combination of sacroiliac pain and potentially facet mediated L4-5 and L5-S1 with an L4-5 spondylolisthesis.  She also has myofascial pain in the gluteal region.   Again has had some improvement with restarting Celebrex and physical therapy but still having significant pain over the sacroiliac joints.  Had mild positive Gaenslen's for right-sided SI pain and Isidro's tests today bilaterally.     2. Improved  right lower extremity radicular pain to the knee.    Severe right foraminal stenosis L4-5 and moderate left L5-S1.  L4-5 mild spondylolisthesis..   Continues to be improved.        3.  Myofascial pain gluteal region greater trochanters.       Discussion:    1.  We discussed the diagnosis and treatment options.  Discussed the options of injection such as SI joint or facets.  Continuing therapy, further imaging.    2.  I recommend bilateral sacroiliac joint injections today.  She has what appears to be mild degenerative change of the SI joints on plain films.  She has some mild  positive Gaenslen with pain on the right along with fevers particularly on the right but most of her pain in general is on the left.  There is bilateral sacroiliac joint injection will be with Dr. Marie.    3.  Can consider medial branch blocks if fails SI joint injection or can consider imaging the pelvis.    4.  Continue Celebrex as needed daily will refill this if needed.    5.  Continue duloxetine 60 mg daily will refill if needed.    6.  Follow-up with me 2 to 4 weeks after injection.        It was our pleasure caring for your patient today, if there any questions or concerns please do not hesitate to contact us.      Subjective:   Patient ID: Marilee Weston is a 61 year old female.    History of Present Illness: Patient presents for follow-up of bilateral gluteal pain left slightly worse than right.  Her pain in general has slightly improved with restarting Celebrex.  Her widespread myalgias have improved and her GERD has been well managed with stopping alcohol.  Has been working with physical therapy 3 visits recently and doing stretches has had some mild improvement but still having pain fairly significant in the low back over the SI regions bilaterally GREGORIO's and throughout the sacrum.  The lumbosacral junction seems to be doing fairly well today.  Her pain is a 5/10 today 7/10 at worst 3/10 at best.  Any twisting makes her pain worse and sedentary activities.  Better with Celebrex and exercise.  No radiation down the legs currently paresthesias or weakness.    Has again had physical therapy 3 visits now also has had some physical therapy in [place] within the past year.  She is tolerating Celebrex well and is also tolerating duloxetine well.      Imaging: MRI imagesOf the lumbar spine from December 2020 personally reviewed.  Shows chronic Schmorl's nodes at L1, 2, 4.  Annular tear superimposed right paracentral disc protrusion at L2-3.  Multilevel degenerative disc disease with severe right foraminal  "stenosis L4-5 and moderate disc height loss.  Spondylolisthesis L4-5 grade 1 with facet arthropathy mild to moderate L4-5 L5-S1.    Review of Systems: Pertinent positives: None.  Pertinent negatives: No numbness, tingling or weakness.  No bowel or bladder incontinence.  No urinary retention.  No fevers, unintentional weight loss, balance changes, headaches, frequent falling, difficulty swallowing, or coordination difficulties.  All others reviewed are negative.           Past Medical History:   Diagnosis Date     Atrial fibrillation (H) 2010     Depression      Fibrocystic breast      Osteopenia      Osteopenia      Paroxysmal atrial fibrillation (H)      Postsurgical hypothyroidism        The following portions of the patient's history were reviewed and updated as appropriate: allergies, current medications, past family history, past medical history, past social history, past surgical history and problem list.           Objective:   Physical Exam:    BP (!) 168/78   Pulse 61   Ht 5' 8\" (1.727 m)   Wt 160 lb (72.6 kg)   BMI 24.33 kg/m    Body mass index is 24.33 kg/m .      General: Alert and oriented with normal affect. Attention, knowledge, memory, and language are intact. No acute distress.   Eyes: Sclerae are clear.  Respirations: Unlabored. CV: No lower extremity edema.  Skin: No rashes seen.    Gait:  Nonantalgic  Tenderness over the GREGORIO's of the sacrum bilaterally and SI joints.  Mild positive Isidro's test on the right for tightness in the low back and gluteal region/SI joint.  Mild positive right-sided pain with left-sided Gaenslen's.  Tightness with thigh thrust bilaterally no severe pain.  Sensation is intact to light touch throughout the  lower extremities.  Reflexes are 2+ patellar and Achilles     Manual muscle testing reveals:  Right /Left out of 5     5/5 hip flexors  5/5 knee flexors  5/5 knee extensors  5/5 ankle plantar flexors  5/5 ankle dorsiflexors  5/5  [facility]       Again, thank you for " allowing me to participate in the care of your patient.        Sincerely,        David Malagon, DO

## 2021-09-01 NOTE — PROGRESS NOTES
Assessment/Plan:      Diagnoses and all orders for this visit:    Sacral pain  -     PAIN Joint Injection Sacroiliac Joint Bilateral; Future    Sacroiliac joint pain  -     PAIN Joint Injection Sacroiliac Joint Bilateral; Future    Spondylolisthesis of lumbar region    Arthropathy of lumbar facet joint    Myofascial pain         Assessment: Pleasant 61 year old female with past medical history significant for depression and thyroid disorder, A. fib with:     1.   Persistent left greater than right sacral pain over the lower SI joints and GREGORIO region of the sacrum.  Previously was having gluteal and PSIS pain as well which has improved restarting Celebrex and physical therapy.  Her pain is likely combination of sacroiliac pain and potentially facet mediated L4-5 and L5-S1 with an L4-5 spondylolisthesis.  She also has myofascial pain in the gluteal region.   Again has had some improvement with restarting Celebrex and physical therapy but still having significant pain over the sacroiliac joints.  Had mild positive Gaenslen's for right-sided SI pain and Isidro's tests today bilaterally.     2. Improved  right lower extremity radicular pain to the knee.    Severe right foraminal stenosis L4-5 and moderate left L5-S1.  L4-5 mild spondylolisthesis..   Continues to be improved.        3.  Myofascial pain gluteal region greater trochanters.       Discussion:    1.  We discussed the diagnosis and treatment options.  Discussed the options of injection such as SI joint or facets.  Continuing therapy, further imaging.    2.  I recommend bilateral sacroiliac joint injections today.  She has what appears to be mild degenerative change of the SI joints on plain films.  She has some mild positive Gaenslen with pain on the right along with fevers particularly on the right but most of her pain in general is on the left.  There is bilateral sacroiliac joint injection will be with Dr. Marie.    3.  Can consider medial branch blocks  if fails SI joint injection or can consider imaging the pelvis.    4.  Continue Celebrex as needed daily will refill this if needed.    5.  Continue duloxetine 60 mg daily will refill if needed.    6.  Follow-up with me 2 to 4 weeks after injection.        It was our pleasure caring for your patient today, if there any questions or concerns please do not hesitate to contact us.      Subjective:   Patient ID: Marilee Weston is a 61 year old female.    History of Present Illness: Patient presents for follow-up of bilateral gluteal pain left slightly worse than right.  Her pain in general has slightly improved with restarting Celebrex.  Her widespread myalgias have improved and her GERD has been well managed with stopping alcohol.  Has been working with physical therapy 3 visits recently and doing stretches has had some mild improvement but still having pain fairly significant in the low back over the SI regions bilaterally GREGORIO's and throughout the sacrum.  The lumbosacral junction seems to be doing fairly well today.  Her pain is a 5/10 today 7/10 at worst 3/10 at best.  Any twisting makes her pain worse and sedentary activities.  Better with Celebrex and exercise.  No radiation down the legs currently paresthesias or weakness.    Has again had physical therapy 3 visits now also has had some physical therapy in [place] within the past year.  She is tolerating Celebrex well and is also tolerating duloxetine well.      Imaging: MRI imagesOf the lumbar spine from December 2020 personally reviewed.  Shows chronic Schmorl's nodes at L1, 2, 4.  Annular tear superimposed right paracentral disc protrusion at L2-3.  Multilevel degenerative disc disease with severe right foraminal stenosis L4-5 and moderate disc height loss.  Spondylolisthesis L4-5 grade 1 with facet arthropathy mild to moderate L4-5 L5-S1.    Review of Systems: Pertinent positives: None.  Pertinent negatives: No numbness, tingling or weakness.  No bowel or  "bladder incontinence.  No urinary retention.  No fevers, unintentional weight loss, balance changes, headaches, frequent falling, difficulty swallowing, or coordination difficulties.  All others reviewed are negative.           Past Medical History:   Diagnosis Date     Atrial fibrillation (H) 2010     Depression      Fibrocystic breast      Osteopenia      Osteopenia      Paroxysmal atrial fibrillation (H)      Postsurgical hypothyroidism        The following portions of the patient's history were reviewed and updated as appropriate: allergies, current medications, past family history, past medical history, past social history, past surgical history and problem list.           Objective:   Physical Exam:    BP (!) 168/78   Pulse 61   Ht 5' 8\" (1.727 m)   Wt 160 lb (72.6 kg)   BMI 24.33 kg/m    Body mass index is 24.33 kg/m .      General: Alert and oriented with normal affect. Attention, knowledge, memory, and language are intact. No acute distress.   Eyes: Sclerae are clear.  Respirations: Unlabored. CV: No lower extremity edema.  Skin: No rashes seen.    Gait:  Nonantalgic  Tenderness over the GREGORIO's of the sacrum bilaterally and SI joints.  Mild positive Isidro's test on the right for tightness in the low back and gluteal region/SI joint.  Mild positive right-sided pain with left-sided Gaenslen's.  Tightness with thigh thrust bilaterally no severe pain.  Sensation is intact to light touch throughout the  lower extremities.  Reflexes are 2+ patellar and Achilles     Manual muscle testing reveals:  Right /Left out of 5     5/5 hip flexors  5/5 knee flexors  5/5 knee extensors  5/5 ankle plantar flexors  5/5 ankle dorsiflexors  5/5  [facility]   "

## 2021-09-01 NOTE — PATIENT INSTRUCTIONS
1. A bilateral sacroiliac joint injection has been ordered today. Please schedule this injection at least  2 weeks from now to allow time for insurance prior authorization. On the day of your injection, you cannot be sick or taking antibiotics. If you become sick and are prescribed, please call the clinic so your injection can be rescheduled for once you have completed your antibiotics. You will need to bring a  with you for your injection. If you have any questions or concerns prior to your injection, please do not hesitate to call the nurse navigation line at [phone].  2. Continue with Physical therapy

## 2021-09-02 ENCOUNTER — HOSPITAL ENCOUNTER (OUTPATIENT)
Dept: PHYSICAL THERAPY | Facility: REHABILITATION | Age: 61
End: 2021-09-02
Payer: COMMERCIAL

## 2021-09-02 DIAGNOSIS — M51.369 DDD (DEGENERATIVE DISC DISEASE), LUMBAR: ICD-10-CM

## 2021-09-02 DIAGNOSIS — M54.50 LUMBAR SPINE PAIN: Primary | ICD-10-CM

## 2021-09-02 DIAGNOSIS — M25.652 DECREASED RANGE OF MOTION OF BOTH HIPS: ICD-10-CM

## 2021-09-02 DIAGNOSIS — M62.81 GENERALIZED MUSCLE WEAKNESS: ICD-10-CM

## 2021-09-02 DIAGNOSIS — M53.3 SACRAL PAIN: ICD-10-CM

## 2021-09-02 DIAGNOSIS — M25.651 DECREASED RANGE OF MOTION OF BOTH HIPS: ICD-10-CM

## 2021-09-02 DIAGNOSIS — M47.816 ARTHROPATHY OF LUMBAR FACET JOINT: ICD-10-CM

## 2021-09-02 PROCEDURE — 97110 THERAPEUTIC EXERCISES: CPT | Mod: GP | Performed by: PHYSICAL THERAPIST

## 2021-09-06 ENCOUNTER — MYC REFILL (OUTPATIENT)
Dept: CARDIOLOGY | Facility: CLINIC | Age: 61
End: 2021-09-06

## 2021-09-06 DIAGNOSIS — I48.0 PAROXYSMAL ATRIAL FIBRILLATION (H): ICD-10-CM

## 2021-09-08 RX ORDER — METOPROLOL SUCCINATE 50 MG/1
75 TABLET, EXTENDED RELEASE ORAL DAILY
Qty: 135 TABLET | Refills: 0 | OUTPATIENT
Start: 2021-09-08

## 2021-09-08 NOTE — TELEPHONE ENCOUNTER
metoprolol succinate ER (TOPROL-XL) 50 MG 24 hr tablet   metoprolol succinate ER (TOPROL-XL) 50 MG 24 hr tablet 135 tablet 0 8/17/2021  No   Sig - Route: Take 1.5 tablets (75 mg) by mouth daily CARDIOLOGY  APPT NEEDED FOR FURTHER REFILLS OR PCP TO FILL - Oral   Sent to pharmacy as: Metoprolol Succinate ER 50 MG Oral Tablet Extended Release 24 Hour (TOPROL-XL)   Class: E-Prescribe   Order: [id number]   E-Prescribing Status: Receipt confirmed by pharmacy (8/17/2021  1:13 PM CDT)   Printout Tracking    External Result Report   Medication Administration Instructions    CARDIOLOGY  APPT NEEDED FOR FURTHER REFILLS OR PCP TO FILL   Pharmacy    [id number] - [place], [place] - [address] [place] [address]  AT [facility] [address]            Flaquita Carlos RN  Central Triage Red Flags/Med Refills

## 2021-09-09 ENCOUNTER — HOSPITAL ENCOUNTER (OUTPATIENT)
Dept: PHYSICAL THERAPY | Facility: REHABILITATION | Age: 61
End: 2021-09-09
Payer: COMMERCIAL

## 2021-09-09 DIAGNOSIS — M51.369 DDD (DEGENERATIVE DISC DISEASE), LUMBAR: ICD-10-CM

## 2021-09-09 DIAGNOSIS — M62.81 GENERALIZED MUSCLE WEAKNESS: ICD-10-CM

## 2021-09-09 DIAGNOSIS — M25.652 DECREASED RANGE OF MOTION OF BOTH HIPS: ICD-10-CM

## 2021-09-09 DIAGNOSIS — M25.651 DECREASED RANGE OF MOTION OF BOTH HIPS: ICD-10-CM

## 2021-09-09 DIAGNOSIS — M47.816 ARTHROPATHY OF LUMBAR FACET JOINT: ICD-10-CM

## 2021-09-09 DIAGNOSIS — M54.50 LUMBAR SPINE PAIN: Primary | ICD-10-CM

## 2021-09-09 DIAGNOSIS — M53.3 SACRAL PAIN: ICD-10-CM

## 2021-09-09 PROCEDURE — 97110 THERAPEUTIC EXERCISES: CPT | Mod: GP | Performed by: PHYSICAL THERAPY ASSISTANT

## 2021-09-17 ENCOUNTER — ANCILLARY PROCEDURE (OUTPATIENT)
Dept: PHYSICAL MEDICINE AND REHAB | Facility: CLINIC | Age: 61
End: 2021-09-17
Attending: PHYSICAL MEDICINE & REHABILITATION
Payer: COMMERCIAL

## 2021-09-17 ENCOUNTER — HOSPITAL ENCOUNTER (OUTPATIENT)
Dept: PHYSICAL THERAPY | Facility: REHABILITATION | Age: 61
End: 2021-09-17
Payer: COMMERCIAL

## 2021-09-17 VITALS
TEMPERATURE: 97.8 F | DIASTOLIC BLOOD PRESSURE: 82 MMHG | SYSTOLIC BLOOD PRESSURE: 122 MMHG | OXYGEN SATURATION: 99 % | HEART RATE: 59 BPM

## 2021-09-17 DIAGNOSIS — M25.651 DECREASED RANGE OF MOTION OF BOTH HIPS: ICD-10-CM

## 2021-09-17 DIAGNOSIS — M62.81 GENERALIZED MUSCLE WEAKNESS: ICD-10-CM

## 2021-09-17 DIAGNOSIS — M53.3 SACRAL PAIN: ICD-10-CM

## 2021-09-17 DIAGNOSIS — M54.50 LUMBAR SPINE PAIN: Primary | ICD-10-CM

## 2021-09-17 DIAGNOSIS — M53.3 SACROILIAC JOINT PAIN: ICD-10-CM

## 2021-09-17 DIAGNOSIS — M51.369 DDD (DEGENERATIVE DISC DISEASE), LUMBAR: ICD-10-CM

## 2021-09-17 DIAGNOSIS — M47.816 ARTHROPATHY OF LUMBAR FACET JOINT: ICD-10-CM

## 2021-09-17 DIAGNOSIS — M25.652 DECREASED RANGE OF MOTION OF BOTH HIPS: ICD-10-CM

## 2021-09-17 PROCEDURE — 97110 THERAPEUTIC EXERCISES: CPT | Mod: GP | Performed by: PHYSICAL THERAPIST

## 2021-09-17 PROCEDURE — 97140 MANUAL THERAPY 1/> REGIONS: CPT | Mod: GP | Performed by: PHYSICAL THERAPIST

## 2021-09-17 PROCEDURE — 27096 INJECT SACROILIAC JOINT: CPT | Mod: 50 | Performed by: PAIN MEDICINE

## 2021-09-17 RX ORDER — LIDOCAINE HYDROCHLORIDE 10 MG/ML
INJECTION, SOLUTION EPIDURAL; INFILTRATION; INTRACAUDAL; PERINEURAL
Status: COMPLETED | OUTPATIENT
Start: 2021-09-17 | End: 2021-09-17

## 2021-09-17 RX ORDER — ROPIVACAINE HYDROCHLORIDE 5 MG/ML
INJECTION, SOLUTION EPIDURAL; INFILTRATION; PERINEURAL
Status: COMPLETED | OUTPATIENT
Start: 2021-09-17 | End: 2021-09-17

## 2021-09-17 RX ORDER — METHYLPREDNISOLONE ACETATE 40 MG/ML
INJECTION, SUSPENSION INTRA-ARTICULAR; INTRALESIONAL; INTRAMUSCULAR; SOFT TISSUE
Status: COMPLETED | OUTPATIENT
Start: 2021-09-17 | End: 2021-09-17

## 2021-09-17 RX ADMIN — METHYLPREDNISOLONE ACETATE 40 MG: 40 INJECTION, SUSPENSION INTRA-ARTICULAR; INTRALESIONAL; INTRAMUSCULAR; SOFT TISSUE at 15:46

## 2021-09-17 RX ADMIN — LIDOCAINE HYDROCHLORIDE 1 ML: 10 INJECTION, SOLUTION EPIDURAL; INFILTRATION; INTRACAUDAL; PERINEURAL at 15:44

## 2021-09-17 RX ADMIN — ROPIVACAINE HYDROCHLORIDE 5 ML: 5 INJECTION, SOLUTION EPIDURAL; INFILTRATION; PERINEURAL at 15:45

## 2021-09-17 ASSESSMENT — PAIN SCALES - GENERAL
PAINLEVEL: NO PAIN (0)
PAINLEVEL: MODERATE PAIN (5)

## 2021-09-17 NOTE — LETTER
9/17/2021         RE: Marilee Weston  [address] [facility] [address]        Dear Colleague,    Thank you for referring your patient, Marilee Weston, to the [facility] [place]. Please see a copy of my visit note below.    .      Again, thank you for allowing me to participate in the care of your patient.        Sincerely,        Justino Marie, DO

## 2021-09-17 NOTE — PATIENT INSTRUCTIONS
DISCHARGE INSTRUCTIONS    During office hours (8:00 a.m.- 4:00 p.m.) questions or concerns may be answered  by calling [facility] Navigation Nurses at  [phone].  Messages received after hours will be returned the following business day.      In the case of an emergency, please dial 911 or seek assistance at the nearest Emergency Room/Urgent Care facility.     All Patients:  ? You may experience an increase in your symptoms for the first 2 days (It may take anywhere between 2 days- 2 weeks for the steroid to have maximum effect).    ? You may use ice on the injection site, as frequently as 20 minutes each hour if needed.    ? You may take your pain medicine.    ? You may continue taking your regular medication.    ? You may shower. No swimming, tub bath or hot tub for 48 hours.  You may remove your bandaid/bandage as soon as you are home.    ? You may resume light activities, as tolerated.    ? Resume your usual diet as tolerated.    ? It is strongly advised that you do not drive for 1-3 hours post injection.    ? If you have had oral sedation:  Do not drive for 8 hours post injection.      ? If you have had IV sedation:  Do not drive for 24 hours post injection.  Do not operate hazardous machinery or make important personal/business decisions for 24 hours.    POSSIBLE STEROID SIDE EFFECTS (If steroid/cortisone was used for your procedure)    -If you experience these symptoms, it should only last for a short period      Swelling of the legs                Skin redness (flushing)       Mouth (oral) irritation     Blood sugar (glucose) levels              Sweats                     Mood changes    Headache    Weakened immune system for up to 14 days, which could increase the risk of jaylyn the COVID-19 virus and/or experiencing more severe symptoms of the disease, if exposed.         POSSIBLE PROCEDURE SIDE EFFECTS    -Call the Spine Center if you are concerned      Increased Pain             Increased  numbness/tingling        Nausea/Vomiting            Bruising/bleeding at site        Redness or swelling                                                Difficulty walking        Weakness            Fever greater than 100.5    *In the event of a severe headache after an epidural steroid injection that is relieved by lying down, please call the [facility] to speak with a clinical staff member*

## 2021-09-22 ENCOUNTER — HOSPITAL ENCOUNTER (OUTPATIENT)
Dept: PHYSICAL THERAPY | Facility: REHABILITATION | Age: 61
End: 2021-09-22
Payer: COMMERCIAL

## 2021-09-22 DIAGNOSIS — M54.50 LUMBAR SPINE PAIN: Primary | ICD-10-CM

## 2021-09-22 DIAGNOSIS — M25.652 DECREASED RANGE OF MOTION OF BOTH HIPS: ICD-10-CM

## 2021-09-22 DIAGNOSIS — M47.816 ARTHROPATHY OF LUMBAR FACET JOINT: ICD-10-CM

## 2021-09-22 DIAGNOSIS — M25.651 DECREASED RANGE OF MOTION OF BOTH HIPS: ICD-10-CM

## 2021-09-22 DIAGNOSIS — M62.81 GENERALIZED MUSCLE WEAKNESS: ICD-10-CM

## 2021-09-22 DIAGNOSIS — M53.3 SACRAL PAIN: ICD-10-CM

## 2021-09-22 DIAGNOSIS — M51.369 DDD (DEGENERATIVE DISC DISEASE), LUMBAR: ICD-10-CM

## 2021-09-22 PROCEDURE — 97110 THERAPEUTIC EXERCISES: CPT | Mod: GP | Performed by: PHYSICAL THERAPIST

## 2021-09-22 NOTE — PROGRESS NOTES
Outpatient Physical Therapy Discharge Note     Patient: Marilee Weston  : 1960    Beginning/End Dates of Reporting Period:  21 to 2021    Referring Provider: Dr Malagon    Therapy Diagnosis: Lumbar Spine Pain, Sacral Pain, DDD     Client Self Report: She had the injection on friday and the pain is so much better. today she states she is able ride with mild pain at times, she reports she is able to go up and down steps, do her laundry, increased ease in bending without difficulty. the grinding buring pain she had on the tailbone as been so much better. she is leaving for [place] next week, today will be the last session       Goals:  Goal Identifier HEP   Goal Description Patient will be I in HEP in order to maximize recovery and prevent reoccurrence in 6 weeks   Target Date 21   Date Met  21   Progress (detail required for progress note): met      Goal Identifier Bending    Goal Description Patient will report increase ease in bending and lifting items >20# with pain no greater than 3/10 for increase ease in home chores and care for horse in 12 weeks   Target Date 21   Date Met  21   Progress (detail required for progress note): met     Goal Identifier Walking   Goal Description Patient will report increased ease in walking in the AM with pain no greater than 3/10 for increase ease in ADLs and self cares in 12 weeks   Target Date 21   Date Met  21   Progress (detail required for progress note): met      Plan:  Discharge from therapy.    Discharge:    Reason for Discharge: Patient has met all goals.  Patient will be leaving for winter next week    Equipment Issued: HEP     Discharge Plan: Patient to continue home program.    Rosanna Fox, PT, DPT, CLJENARO-MALKA

## 2021-09-26 ENCOUNTER — MYC MEDICAL ADVICE (OUTPATIENT)
Dept: PHYSICAL MEDICINE AND REHAB | Facility: CLINIC | Age: 61
End: 2021-09-26

## 2021-09-27 ENCOUNTER — TRANSFERRED RECORDS (OUTPATIENT)
Dept: HEALTH INFORMATION MANAGEMENT | Facility: CLINIC | Age: 61
End: 2021-09-27

## 2021-09-29 ENCOUNTER — OFFICE VISIT (OUTPATIENT)
Dept: PHYSICAL MEDICINE AND REHAB | Facility: CLINIC | Age: 61
End: 2021-09-29
Payer: COMMERCIAL

## 2021-09-29 VITALS — DIASTOLIC BLOOD PRESSURE: 74 MMHG | SYSTOLIC BLOOD PRESSURE: 117 MMHG | HEART RATE: 58 BPM

## 2021-09-29 DIAGNOSIS — M53.3 SACROILIAC JOINT PAIN: Primary | ICD-10-CM

## 2021-09-29 DIAGNOSIS — M79.18 MYOFASCIAL PAIN: ICD-10-CM

## 2021-09-29 DIAGNOSIS — M47.816 ARTHROPATHY OF LUMBAR FACET JOINT: ICD-10-CM

## 2021-09-29 DIAGNOSIS — M43.16 SPONDYLOLISTHESIS OF LUMBAR REGION: ICD-10-CM

## 2021-09-29 PROCEDURE — 99213 OFFICE O/P EST LOW 20 MIN: CPT | Performed by: PHYSICAL MEDICINE & REHABILITATION

## 2021-09-29 ASSESSMENT — PAIN SCALES - GENERAL: PAINLEVEL: MILD PAIN (3)

## 2021-09-29 NOTE — PROGRESS NOTES
Assessment/Plan:      Marilee was seen today for back pain.    Diagnoses and all orders for this visit:    Sacroiliac joint pain    Spondylolisthesis of lumbar region    Arthropathy of lumbar facet joint    Myofascial pain         Assessment: Pleasant 61 year old female with past medical history significant for depression and thyroid disorder, A. fib with:     1.     Marked improvement of left greater than right sacral pain over the lower SI joints and GREGORIO region of the sacrum.  Previously was having gluteal and PSIS pain as well which has improved restarting Celebrex and physical therapy.  Her pain is likely combination of sacroiliac pain and potentially facet mediated L4-5 and L5-S1 with an L4-5 spondylolisthesis.  She also has myofascial pain in the gluteal region.   Again has had some improvement with restarting Celebrex and physical therapy but still having significant pain over the sacroiliac joints.  Had mild positive Gaenslen's for right-sided SI pain and Isidro's tests today bilaterally.  Up to 80% improvement following bilateral sacroiliac joint injections.  Still has some lateral gluteal pain consistent with myofascial pain.     2. Improved  right lower extremity radicular pain to the knee.    Severe right foraminal stenosis L4-5 and moderate left L5-S1.  L4-5 mild spondylolisthesis..   Continues to be improved.        3.  Myofascial pain gluteal region greater trochanters.  Improved but ongoing    Discussion:    1.  We discussed the diagnosis and treatment options.  We discussed options of continuing to monitor symptoms and continue core exercises with some ischemic compression type exercises which I recommend that she pursue.    2.  Continue home exercises from physical therapy.    3.  If her symptoms return can consider medial branch blocks for the lumbar spine versus repeat SI joint injections.    4.  Continue  with Cymbalta 60 mg daily.  Discussed that she should not stop this abruptly.    5.  Continue  Celebrex as needed.    6.  Follow-up with me as needed      It was our pleasure caring for your patient today, if there any questions or concerns please do not hesitate to contact us.      Subjective:   Patient ID: Marilee Weston is a 61 year old female.    History of Present Illness:   patient presents for follow-up after bilateral sacroiliac joint injections.  Overall she is doing quite well up to 80% improvement.  Injection was fairly painful but doing well.  5/10 pain at worst 3/10 today 0/10 at best.  Her whole body feels better after the injection.  She still has some lateral gluteal pain particularly with horse riding doing well with Celebrex and Cymbalta.  Pain is worse with sitting better with changing position and moving.  No radiation down the legs paresthesias feels some weakness in her buttock.  Quite pleased with her progress.      Imaging: MRI lumbar spine images from December 2020 reviewed.  Schmorl's nodes chronic L1, 2, 4.  Annular tear superimposed right paracentral disc protrusion L2-3.  Severe right foraminal stenosis L4-5 with moderate disc height loss and a spondylolisthesis which is grade 1 and facet arthropathy.    Review of Systems: Pertinent positives: Some weakness in her buttock..  Pertinent negatives: No numbness or tingling.  No bowel or bladder incontinence.  No urinary retention.  No fevers, unintentional weight loss, balance changes, headaches, frequent falling, difficulty swallowing, or coordination difficulties.  All others reviewed are negative.           Past Medical History:   Diagnosis Date     Atrial fibrillation (H) 2010     Depression      Fibrocystic breast      Osteopenia      Osteopenia      Paroxysmal atrial fibrillation (H)      Postsurgical hypothyroidism        The following portions of the patient's history were reviewed and updated as appropriate: allergies, current medications, past family history, past medical history, past social history, past surgical history  and problem list.           Objective:   Physical Exam:    /74 (BP Location: Left arm, Patient Position: Sitting, Cuff Size: Adult Regular)   Pulse 58   There is no height or weight on file to calculate BMI.      General: Alert and oriented with normal affect. Attention, knowledge, memory, and language are intact. No acute distress.   Eyes: Sclerae are clear.  Respirations: Unlabored. CV: No lower extremity edema.  Skin: No rashes seen.    Gait:  Nonantalgic  Tenderness over the bilateral gluteus medius muscles.  Sensation is intact to light touch throughout the   lower extremities.  Reflexes are  2+ patellar and Achilles     Manual muscle testing reveals:  Right /Left out of 5     5/5 hip flexors  5/5 knee flexors  5/5 knee extensors  5/5 ankle plantar flexors  5/5 ankle dorsiflexors  5/5  EHL

## 2021-09-29 NOTE — LETTER
9/29/2021         RE: Marilee Weston  [address]  [facility] [address]        Dear Colleague,    Thank you for referring your patient, Marilee Weston, to the [facility] [place]. Please see a copy of my visit note below.    Assessment/Plan:      Marilee was seen today for back pain.    Diagnoses and all orders for this visit:    Sacroiliac joint pain    Spondylolisthesis of lumbar region    Arthropathy of lumbar facet joint    Myofascial pain         Assessment: Pleasant 61 year old female with past medical history significant for depression and thyroid disorder, A. fib with:     1.     Marked improvement of left greater than right sacral pain over the lower SI joints and GREGORIO region of the sacrum.  Previously was having gluteal and PSIS pain as well which has improved restarting Celebrex and physical therapy.  Her pain is likely combination of sacroiliac pain and potentially facet mediated L4-5 and L5-S1 with an L4-5 spondylolisthesis.  She also has myofascial pain in the gluteal region.   Again has had some improvement with restarting Celebrex and physical therapy but still having significant pain over the sacroiliac joints.  Had mild positive Gaenslen's for right-sided SI pain and Isidro's tests today bilaterally.  Up to 80% improvement following bilateral sacroiliac joint injections.  Still has some lateral gluteal pain consistent with myofascial pain.     2. Improved  right lower extremity radicular pain to the knee.    Severe right foraminal stenosis L4-5 and moderate left L5-S1.  L4-5 mild spondylolisthesis..   Continues to be improved.        3.  Myofascial pain gluteal region greater trochanters.  Improved but ongoing    Discussion:    1.  We discussed the diagnosis and treatment options.  We discussed options of continuing to monitor symptoms and continue core exercises with some ischemic compression type exercises which I recommend that she pursue.    2.  Continue home exercises from  physical therapy.    3.  If her symptoms return can consider medial branch blocks for the lumbar spine versus repeat SI joint injections.    4.  Continue  with Cymbalta 60 mg daily.  Discussed that she should not stop this abruptly.    5.  Continue Celebrex as needed.    6.  Follow-up with me as needed      It was our pleasure caring for your patient today, if there any questions or concerns please do not hesitate to contact us.      Subjective:   Patient ID: Marilee Weston is a 61 year old female.    History of Present Illness:   patient presents for follow-up after bilateral sacroiliac joint injections.  Overall she is doing quite well up to 80% improvement.  Injection was fairly painful but doing well.  5/10 pain at worst 3/10 today 0/10 at best.  Her whole body feels better after the injection.  She still has some lateral gluteal pain particularly with horse riding doing well with Celebrex and Cymbalta.  Pain is worse with sitting better with changing position and moving.  No radiation down the legs paresthesias feels some weakness in her buttock.  Quite pleased with her progress.      Imaging: MRI lumbar spine images from December 2020 reviewed.  Schmorl's nodes chronic L1, 2, 4.  Annular tear superimposed right paracentral disc protrusion L2-3.  Severe right foraminal stenosis L4-5 with moderate disc height loss and a spondylolisthesis which is grade 1 and facet arthropathy.    Review of Systems: Pertinent positives: Some weakness in her buttock..  Pertinent negatives: No numbness or tingling.  No bowel or bladder incontinence.  No urinary retention.  No fevers, unintentional weight loss, balance changes, headaches, frequent falling, difficulty swallowing, or coordination difficulties.  All others reviewed are negative.           Past Medical History:   Diagnosis Date     Atrial fibrillation (H) 2010     Depression      Fibrocystic breast      Osteopenia      Osteopenia      Paroxysmal atrial fibrillation (H)       Postsurgical hypothyroidism        The following portions of the patient's history were reviewed and updated as appropriate: allergies, current medications, past family history, past medical history, past social history, past surgical history and problem list.           Objective:   Physical Exam:    /74 (BP Location: Left arm, Patient Position: Sitting, Cuff Size: Adult Regular)   Pulse 58   There is no height or weight on file to calculate BMI.      General: Alert and oriented with normal affect. Attention, knowledge, memory, and language are intact. No acute distress.   Eyes: Sclerae are clear.  Respirations: Unlabored. CV: No lower extremity edema.  Skin: No rashes seen.    Gait:  Nonantalgic  Tenderness over the bilateral gluteus medius muscles.  Sensation is intact to light touch throughout the   lower extremities.  Reflexes are  2+ patellar and Achilles     Manual muscle testing reveals:  Right /Left out of 5     5/5 hip flexors  5/5 knee flexors  5/5 knee extensors  5/5 ankle plantar flexors  5/5 ankle dorsiflexors  5/5  EHL       Again, thank you for allowing me to participate in the care of your patient.        Sincerely,        David Malagon DO

## 2021-10-02 ENCOUNTER — HEALTH MAINTENANCE LETTER (OUTPATIENT)
Age: 61
End: 2021-10-02

## 2021-10-06 DIAGNOSIS — M43.16 SPONDYLOLISTHESIS OF LUMBAR REGION: Primary | ICD-10-CM

## 2021-10-06 RX ORDER — DULOXETIN HYDROCHLORIDE 30 MG/1
60 CAPSULE, DELAYED RELEASE ORAL DAILY
Qty: 60 CAPSULE | Refills: 1 | Status: SHIPPED | OUTPATIENT
Start: 2021-10-06 | End: 2021-12-16

## 2021-10-24 DIAGNOSIS — I48.0 PAROXYSMAL ATRIAL FIBRILLATION (H): ICD-10-CM

## 2021-10-27 NOTE — TELEPHONE ENCOUNTER
METOPROLOL ER SUCCINATE 50MG TABS      Last Written Prescription Date:  8/17/21  Last Fill Quantity: 135,   # refills: 0  Last Office Visit : Mayra Burns MD  Cardiology  2/3/2020  [facility]  Recommended 1 year follow up  Future Office visit:  None scheduled    Routing refill request to provider for review/approval because:  > 18 months since last visit  Received notification of scheduling follow up  Last RX shows   CARDIOLOGY  APPT NEEDED FOR FURTHER REFILLS OR PCP TO FILL   Defer to primary?

## 2021-10-29 ENCOUNTER — MYC MEDICAL ADVICE (OUTPATIENT)
Dept: CARDIOLOGY | Facility: CLINIC | Age: 61
End: 2021-10-29

## 2021-10-29 DIAGNOSIS — I48.0 PAROXYSMAL ATRIAL FIBRILLATION (H): ICD-10-CM

## 2021-10-29 RX ORDER — METOPROLOL SUCCINATE 50 MG/1
75 TABLET, EXTENDED RELEASE ORAL DAILY
Qty: 45 TABLET | Refills: 0 | Status: SHIPPED | OUTPATIENT
Start: 2021-10-29 | End: 2021-12-06

## 2021-12-06 ENCOUNTER — OFFICE VISIT (OUTPATIENT)
Dept: CARDIOLOGY | Facility: CLINIC | Age: 61
End: 2021-12-06
Payer: COMMERCIAL

## 2021-12-06 ENCOUNTER — ANCILLARY PROCEDURE (OUTPATIENT)
Dept: CARDIOLOGY | Facility: CLINIC | Age: 61
End: 2021-12-06
Attending: INTERNAL MEDICINE
Payer: COMMERCIAL

## 2021-12-06 VITALS
OXYGEN SATURATION: 98 % | WEIGHT: 164 LBS | DIASTOLIC BLOOD PRESSURE: 91 MMHG | SYSTOLIC BLOOD PRESSURE: 142 MMHG | BODY MASS INDEX: 24.94 KG/M2 | HEART RATE: 58 BPM

## 2021-12-06 DIAGNOSIS — I48.0 PAROXYSMAL ATRIAL FIBRILLATION (H): Primary | ICD-10-CM

## 2021-12-06 DIAGNOSIS — I48.0 PAROXYSMAL ATRIAL FIBRILLATION (H): ICD-10-CM

## 2021-12-06 PROCEDURE — 93248 EXT ECG>7D<15D REV&INTERPJ: CPT | Performed by: INTERNAL MEDICINE

## 2021-12-06 PROCEDURE — 93246 EXT ECG>7D<15D RECORDING: CPT | Performed by: INTERNAL MEDICINE

## 2021-12-06 PROCEDURE — 99213 OFFICE O/P EST LOW 20 MIN: CPT | Performed by: INTERNAL MEDICINE

## 2021-12-06 RX ORDER — METOPROLOL SUCCINATE 50 MG/1
75 TABLET, EXTENDED RELEASE ORAL DAILY
Qty: 45 TABLET | Refills: 11 | Status: SHIPPED | OUTPATIENT
Start: 2021-12-06 | End: 2022-05-12

## 2021-12-06 NOTE — NURSING NOTE
"Chief Complaint   Patient presents with     RECHECK     annual follow up.        Initial BP (!) 142/91 (BP Location: Right arm, Patient Position: Chair, Cuff Size: Adult Regular)   Pulse 58   Wt 74.4 kg (164 lb)   SpO2 98%   BMI 24.94 kg/m   Estimated body mass index is 24.94 kg/m  as calculated from the following:    Height as of 9/1/21: 1.727 m (5' 8\").    Weight as of this encounter: 74.4 kg (164 lb)..  BP completed using cuff size: regular    Whitley Zuleta MA  "

## 2021-12-06 NOTE — PATIENT INSTRUCTIONS
Thank you for coming to the [facility] [place]; please note the following instructions:    1.  We have applied a heart monitor (ZIO Patch) for you to wear for 2 weeks.  You may remove the heart monitor on Monday, Dec. 20th at 3:45 pm.  Please see the instruction booklet for further information, as well as instructions for removal of the heart monitor and return mailing directions.  If you should have questions regarding your monitor, please call [employer] at [phone].  We will contact you with your results (please see result notification details at bottom of summary).    *PLEASE DO NOT SHOWER OR INDUCE EXCESSIVE SWEATING IN THE FIRST 24 HOURS OF WEARING*    2.  The heart monitor results will be given to you over the phone or [employer].    3.  Follow up in 1 year in clinic with Dr. Burns.  The cardiology team will contact you when the time gets closer.          If you have any questions regarding your visit please contact your care team:     Cardiology  Telephone Number   Renee HOLLIDAY, RN  Arlene MCCALL, RN   Deandra SALAS, YOLANDA HANSEN, RMLACHO DAHL, LPN   [phone] (option [phone])   For scheduling appts:     [phone] (select option 1)       For the Device Clinic (Pacemakers and ICD's)  RN's :  Liane Diop   During business hours: [phone]    *After business hours:  [phone] (select option 4)      Normal test result notifications will be released via [employer] or mailed within 7 business days.  All other test results, will be communicated via telephone once reviewed by your cardiologist.    If you need a medication refill please contact your pharmacy.  Please allow 3 business days for your refill to be completed.    As always, thank you for trusting us with your health care needs!

## 2021-12-06 NOTE — LETTER
12/6/2021      RE: [place]  [facility] [address]       Dear Colleague,    Thank you for the opportunity to participate in the care of your patient, Marilee Weston, at the [facility]. Please see a copy of my visit note below.    HPI: Purpose of visit: Follow-up of paroxysmal atrial fibrillation    Patient is a 61-year-old woman whom I have been following for paroxysmal atrial fibrillation.  She does not have risk factors for stroke such as hypertension, diabetes, coronary artery disease, heart failure, or prior stroke or TIA.    Patient's last visit with me was in February 2020.  Patient has been taking metoprolol XL 25 mg once daily and since the last visit patient has not had any recurrent atrial fibrillation episodes.  Patient does not report symptoms of exertional dyspnea, exertional angina, frequent lightheadedness, presyncope or syncope.  She has remained active and had participated and won in a recent horseback riding contest.  Overall, patient is very happy with how she is doing.    PAST MEDICAL HISTORY:  Past Medical History:   Diagnosis Date     Atrial fibrillation (H) 2010     Depression      Fibrocystic breast      Osteopenia      Osteopenia      Paroxysmal atrial fibrillation (H)      Postsurgical hypothyroidism        CURRENT MEDICATIONS:  Current Outpatient Medications   Medication Sig Dispense Refill     calcium carbonate 600 mg-vitamin D 400 units (CALTRATE) 600-400 MG-UNIT per tablet Take 1 tablet by mouth 2 times daily       celecoxib (CELEBREX) 200 MG capsule TAKE 1 CAPSULE(200 MG) BY MOUTH DAILY 30 capsule 3     Collagen Hydrolysate, Bovine, POWD        DULoxetine (CYMBALTA) 30 MG capsule Take 2 capsules (60 mg) by mouth daily 60 capsule 1     estradiol (VAGIFEM) 10 MCG TABS Place 10 mcg vaginally daily       Estradiol-Levonorgestrel (CLIMARA PRO) 0.045-0.015 MG/DAY PTWK        fish  oil-omega-3 fatty acids (FISH OIL) 1000 MG capsule Take 1 g by mouth 2 times daily.       Flaxseed, Linseed, (FLAXSEED OIL PO)        levothyroxine (SYNTHROID) 100 MCG tablet Take 1 tablet (100 mcg) by mouth daily 90 tablet 3     metoprolol succinate ER (TOPROL-XL) 50 MG 24 hr tablet Take 1.5 tablets (75 mg) by mouth daily 45 tablet 11     metoprolol tartrate (LOPRESSOR) 25 MG tablet Take 1 tablet (25 mg) by mouth as needed (Take 25mg for AF episodes, if persists after 1-2 hours, take another 25mg.) 60 tablet 0     omeprazole (PRILOSEC) 20 MG DR capsule Take 20 mg by mouth         PAST SURGICAL HISTORY:  Past Surgical History:   Procedure Laterality Date     BIOPSY BREAST Right     clogged duct     PILONIDAL CYST / SINUS EXCISION  1979     STRIP VEIN  1975, 1986     THYROIDECTOMY  1985    benign tumor     THYROIDECTOMY   1986    Partial for benign thyroid tumor     VEIN LIGATION AND STRIPPING Right 1975    x2     VEIN SURGERY  2015       ALLERGIES:     Allergies   Allergen Reactions     Pcn [Penicillins] Rash       FAMILY HISTORY:  - Premature coronary artery disease  - Atrial fibrillation  - Sudden cardiac death     SOCIAL HISTORY:  Social History     Tobacco Use     Smoking status: Never Smoker     Smokeless tobacco: Never Used   Substance Use Topics     Alcohol use: Yes     Alcohol/week: 7.0 standard drinks     Comment: Occ.     Drug use: No       ROS:   Constitutional: No fever, chills, or sweats. Weight stable.   ENT: No visual disturbance, ear ache, epistaxis, sore throat.   Cardiovascular: As per HPI.   Respiratory: No cough, hemoptysis.    GI: No nausea, vomiting, hematemesis, melena, or hematochezia.   : No hematuria.   Integument: Negative.   Psychiatric: Negative.   Hematologic:  Easy bruising, no easy bleeding.  Neuro: Negative.   Endocrinology: No significant heat or cold intolerance   Musculoskeletal: No myalgia.    Exam:  BP (!) 142/91 (BP Location: Right arm, Patient Position: Chair, Cuff Size:  Adult Regular)   Pulse 58   Wt 74.4 kg (164 lb)   SpO2 98%   BMI 24.94 kg/m    GENERAL APPEARANCE: healthy, alert and no distress  HEENT: no icterus, no xanthelasmas, normal pupil size and reaction, normal palate, mucosa moist, no central cyanosis  NECK: no adenopathy, no asymmetry, masses, or scars, thyroid normal to palpation and no bruits, JVP not elevated  RESPIRATORY: lungs clear to auscultation - no rales, rhonchi or wheezes, no use of accessory muscles, no retractions, respirations are unlabored, normal respiratory rate  CARDIOVASCULAR: regular rhythm, normal S1 with physiologic split S2, no S3 or S4 and no murmur, click or rub, precordium quiet with normal PMI.  ABDOMEN: soft, non tender, without hepatosplenomegaly, no masses palpable, bowel sounds normal, aorta not enlarged by palpation, no abdominal bruits  EXTREMITIES: peripheral pulses normal, no edema, no bruits  NEURO: alert and oriented to person/place/time, normal speech, gait and affect  VASC: Radial, femoral, dorsalis pedis and posterior tibialis pulses are normal in volumes and symmetric bilaterally. No bruits are heard.  SKIN: no ecchymoses, no rashes    Labs:  CBC RESULTS:   Lab Results   Component Value Date    WBC 4.7 06/14/2021    WBC 5.2 01/15/2018    RBC 4.11 06/14/2021    RBC 4.30 01/15/2018    HGB 13.1 06/14/2021    HGB 13.4 01/15/2018    HCT 40.3 06/14/2021    HCT 41.5 01/15/2018    MCV 98 06/14/2021    MCV 97 01/15/2018    MCH 31.9 06/14/2021    MCH 31.2 01/15/2018    MCHC 32.5 06/14/2021    MCHC 32.3 01/15/2018    RDW 12.4 06/14/2021    RDW 12.4 01/15/2018     06/14/2021     01/15/2018       BMP RESULTS:  Lab Results   Component Value Date     06/14/2021     01/15/2018    POTASSIUM 4.3 06/14/2021    POTASSIUM 3.8 01/15/2018    CHLORIDE 106 06/14/2021    CHLORIDE 105 01/15/2018    CO2 25 06/14/2021    CO2 31 01/15/2018    ANIONGAP 10 06/14/2021    ANIONGAP 5 01/15/2018     06/14/2021    GLC 96  04/02/2019    BUN 18 06/14/2021    BUN 16 01/15/2018    CR 0.71 06/14/2021    CR 0.85 01/15/2018    GFRESTIMATED >60 06/14/2021    GFRESTIMATED 68 01/15/2018    GFRESTBLACK >60 06/14/2021    GFRESTBLACK 82 01/15/2018    AGUSTINA 8.9 06/14/2021    AGUSTINA 8.7 01/15/2018        INR RESULTS:  No results found for: INR    Procedures:      Assessment and Plan:     Paroxysmal atrial fibrillation    It is encouraging that patient is doing very well and her atrial fibrillation is well controlled by metoprolol XL 75 mg once daily.  Patient is concerned about asymptomatic episodes of atrial fibrillation.  To this end, we will place a 2-week Zio patch monitor and communicate the results to the patient.  We will continue metoprolol XL 25 mg once daily.    We will see patient in the heart rhythm clinic in approximately 1 year.  All questions and concerns were addressed and patient is happy with the plan.          CC  Patient Care Team:  Chang Vora MD as PCP - General  Kelley Petit RN as Specialty Care Coordinator (Cardiology)  David Malagon DO as Assigned Neuroscience Provider

## 2021-12-06 NOTE — PROGRESS NOTES
HPI: Purpose of visit: Follow-up of paroxysmal atrial fibrillation    Patient is a 61-year-old woman whom I have been following for paroxysmal atrial fibrillation.  She does not have risk factors for stroke such as hypertension, diabetes, coronary artery disease, heart failure, or prior stroke or TIA.    Patient's last visit with me was in February 2020.  Patient has been taking metoprolol XL 25 mg once daily and since the last visit patient has not had any recurrent atrial fibrillation episodes.  Patient does not report symptoms of exertional dyspnea, exertional angina, frequent lightheadedness, presyncope or syncope.  She has remained active and had participated and won in a recent horseback riding contest.  Overall, patient is very happy with how she is doing.    PAST MEDICAL HISTORY:  Past Medical History:   Diagnosis Date     Atrial fibrillation (H) 2010     Depression      Fibrocystic breast      Osteopenia      Osteopenia      Paroxysmal atrial fibrillation (H)      Postsurgical hypothyroidism        CURRENT MEDICATIONS:  Current Outpatient Medications   Medication Sig Dispense Refill     calcium carbonate 600 mg-vitamin D 400 units (CALTRATE) 600-400 MG-UNIT per tablet Take 1 tablet by mouth 2 times daily       celecoxib (CELEBREX) 200 MG capsule TAKE 1 CAPSULE(200 MG) BY MOUTH DAILY 30 capsule 3     Collagen Hydrolysate, Bovine, POWD        DULoxetine (CYMBALTA) 30 MG capsule Take 2 capsules (60 mg) by mouth daily 60 capsule 1     estradiol (VAGIFEM) 10 MCG TABS Place 10 mcg vaginally daily       Estradiol-Levonorgestrel (CLIMARA PRO) 0.045-0.015 MG/DAY PTWK        fish oil-omega-3 fatty acids (FISH OIL) 1000 MG capsule Take 1 g by mouth 2 times daily.       Flaxseed, Linseed, (FLAXSEED OIL PO)        levothyroxine (SYNTHROID) 100 MCG tablet Take 1 tablet (100 mcg) by mouth daily 90 tablet 3     metoprolol succinate ER (TOPROL-XL) 50 MG 24 hr tablet Take 1.5 tablets (75 mg) by mouth daily 45 tablet 11      metoprolol tartrate (LOPRESSOR) 25 MG tablet Take 1 tablet (25 mg) by mouth as needed (Take 25mg for AF episodes, if persists after 1-2 hours, take another 25mg.) 60 tablet 0     omeprazole (PRILOSEC) 20 MG DR capsule Take 20 mg by mouth         PAST SURGICAL HISTORY:  Past Surgical History:   Procedure Laterality Date     BIOPSY BREAST Right     clogged duct     PILONIDAL CYST / SINUS EXCISION  1979     STRIP VEIN  1975, 1986     THYROIDECTOMY  1985    benign tumor     THYROIDECTOMY   1986    Partial for benign thyroid tumor     VEIN LIGATION AND STRIPPING Right 1975    x2     VEIN SURGERY  2015       ALLERGIES:     Allergies   Allergen Reactions     Pcn [Penicillins] Rash       FAMILY HISTORY:  - Premature coronary artery disease  - Atrial fibrillation  - Sudden cardiac death     SOCIAL HISTORY:  Social History     Tobacco Use     Smoking status: Never Smoker     Smokeless tobacco: Never Used   Substance Use Topics     Alcohol use: Yes     Alcohol/week: 7.0 standard drinks     Comment: Occ.     Drug use: No       ROS:   Constitutional: No fever, chills, or sweats. Weight stable.   ENT: No visual disturbance, ear ache, epistaxis, sore throat.   Cardiovascular: As per HPI.   Respiratory: No cough, hemoptysis.    GI: No nausea, vomiting, hematemesis, melena, or hematochezia.   : No hematuria.   Integument: Negative.   Psychiatric: Negative.   Hematologic:  Easy bruising, no easy bleeding.  Neuro: Negative.   Endocrinology: No significant heat or cold intolerance   Musculoskeletal: No myalgia.    Exam:  BP (!) 142/91 (BP Location: Right arm, Patient Position: Chair, Cuff Size: Adult Regular)   Pulse 58   Wt 74.4 kg (164 lb)   SpO2 98%   BMI 24.94 kg/m    GENERAL APPEARANCE: healthy, alert and no distress  HEENT: no icterus, no xanthelasmas, normal pupil size and reaction, normal palate, mucosa moist, no central cyanosis  NECK: no adenopathy, no asymmetry, masses, or scars, thyroid normal to palpation and no  bruits, JVP not elevated  RESPIRATORY: lungs clear to auscultation - no rales, rhonchi or wheezes, no use of accessory muscles, no retractions, respirations are unlabored, normal respiratory rate  CARDIOVASCULAR: regular rhythm, normal S1 with physiologic split S2, no S3 or S4 and no murmur, click or rub, precordium quiet with normal PMI.  ABDOMEN: soft, non tender, without hepatosplenomegaly, no masses palpable, bowel sounds normal, aorta not enlarged by palpation, no abdominal bruits  EXTREMITIES: peripheral pulses normal, no edema, no bruits  NEURO: alert and oriented to person/place/time, normal speech, gait and affect  VASC: Radial, femoral, dorsalis pedis and posterior tibialis pulses are normal in volumes and symmetric bilaterally. No bruits are heard.  SKIN: no ecchymoses, no rashes    Labs:  CBC RESULTS:   Lab Results   Component Value Date    WBC 4.7 06/14/2021    WBC 5.2 01/15/2018    RBC 4.11 06/14/2021    RBC 4.30 01/15/2018    HGB 13.1 06/14/2021    HGB 13.4 01/15/2018    HCT 40.3 06/14/2021    HCT 41.5 01/15/2018    MCV 98 06/14/2021    MCV 97 01/15/2018    MCH 31.9 06/14/2021    MCH 31.2 01/15/2018    MCHC 32.5 06/14/2021    MCHC 32.3 01/15/2018    RDW 12.4 06/14/2021    RDW 12.4 01/15/2018     06/14/2021     01/15/2018       BMP RESULTS:  Lab Results   Component Value Date     06/14/2021     01/15/2018    POTASSIUM 4.3 06/14/2021    POTASSIUM 3.8 01/15/2018    CHLORIDE 106 06/14/2021    CHLORIDE 105 01/15/2018    CO2 25 06/14/2021    CO2 31 01/15/2018    ANIONGAP 10 06/14/2021    ANIONGAP 5 01/15/2018     06/14/2021    GLC 96 04/02/2019    BUN 18 06/14/2021    BUN 16 01/15/2018    CR 0.71 06/14/2021    CR 0.85 01/15/2018    GFRESTIMATED >60 06/14/2021    GFRESTIMATED 68 01/15/2018    GFRESTBLACK >60 06/14/2021    GFRESTBLACK 82 01/15/2018    AGUSTINA 8.9 06/14/2021    AGUSTINA 8.7 01/15/2018        INR RESULTS:  No results found for: INR    Procedures:      Assessment and  Plan:     Paroxysmal atrial fibrillation    It is encouraging that patient is doing very well and her atrial fibrillation is well controlled by metoprolol XL 75 mg once daily.  Patient is concerned about asymptomatic episodes of atrial fibrillation.  To this end, we will place a 2-week Zio patch monitor and communicate the results to the patient.  We will continue metoprolol XL 25 mg once daily.    We will see patient in the heart rhythm clinic in approximately 1 year.  All questions and concerns were addressed and patient is happy with the plan.          CC  Patient Care Team:  Chang Vora MD as PCP - General  Mayra Chopra MD as MD (Cardiology)  Kelley Petit RN as Specialty Care Coordinator (Cardiology)  Chang Vora MD as Assigned PCP  David Malagon DO as Assigned Neuroscience Provider  MAYRA CHOPRA

## 2021-12-07 ENCOUNTER — E-VISIT (OUTPATIENT)
Dept: URGENT CARE | Facility: CLINIC | Age: 61
End: 2021-12-07
Payer: COMMERCIAL

## 2021-12-07 DIAGNOSIS — J01.90 ACUTE SINUSITIS WITH SYMPTOMS > 10 DAYS: Primary | ICD-10-CM

## 2021-12-07 PROCEDURE — 99421 OL DIG E/M SVC 5-10 MIN: CPT | Performed by: EMERGENCY MEDICINE

## 2021-12-07 RX ORDER — DOXYCYCLINE HYCLATE 100 MG
100 TABLET ORAL 2 TIMES DAILY
Qty: 14 TABLET | Refills: 0 | Status: SHIPPED | OUTPATIENT
Start: 2021-12-07 | End: 2021-12-14

## 2021-12-07 NOTE — PATIENT INSTRUCTIONS
Dear Marilee Weston    After reviewing your responses, I've been able to diagnose you with?a sinus infection caused by bacteria.?     Based on your responses and diagnosis, I have prescribed Doxycycline to treat your symptoms. I have sent this to your pharmacy.?     It is also important to stay well hydrated, get lots of rest and take over-the-counter decongestants,?tylenol?or ibuprofen if you?are able to?take those medications per your primary care provider to help relieve discomfort.?     It is important that you take?all of?your prescribed medication even if your symptoms are improving after a few doses.? Taking?all of?your medicine helps prevent the symptoms from returning.?     If your symptoms worsen, you develop severe headache, vomiting, high fever (>102), or are not improving in 7 days, please contact your primary care provider for an appointment or visit any of our convenient Walk-in Care or Urgent Care Centers to be seen which can be found on our website?here.?     Thanks again for choosing?us?as your health care partner,?   ?  Sean Anderson MD?

## 2021-12-16 DIAGNOSIS — M43.16 SPONDYLOLISTHESIS OF LUMBAR REGION: ICD-10-CM

## 2021-12-16 DIAGNOSIS — M54.50 LUMBAR SPINE PAIN: ICD-10-CM

## 2021-12-16 RX ORDER — DULOXETIN HYDROCHLORIDE 30 MG/1
60 CAPSULE, DELAYED RELEASE ORAL DAILY
Qty: 60 CAPSULE | Refills: 1 | Status: SHIPPED | OUTPATIENT
Start: 2021-12-16 | End: 2022-02-22

## 2021-12-16 RX ORDER — CELECOXIB 200 MG/1
200 CAPSULE ORAL DAILY
Qty: 30 CAPSULE | Refills: 3 | Status: SHIPPED | OUTPATIENT
Start: 2021-12-16 | End: 2022-05-02

## 2021-12-18 ENCOUNTER — E-VISIT (OUTPATIENT)
Dept: FAMILY MEDICINE | Facility: CLINIC | Age: 61
End: 2021-12-18
Payer: COMMERCIAL

## 2021-12-18 DIAGNOSIS — J01.01 ACUTE RECURRENT MAXILLARY SINUSITIS: Primary | ICD-10-CM

## 2021-12-18 DIAGNOSIS — J01.90 ACUTE SINUSITIS WITH SYMPTOMS > 10 DAYS: ICD-10-CM

## 2021-12-18 PROCEDURE — 99421 OL DIG E/M SVC 5-10 MIN: CPT | Performed by: PHYSICIAN ASSISTANT

## 2021-12-18 RX ORDER — METHYLPREDNISOLONE 4 MG
TABLET, DOSE PACK ORAL
Qty: 21 TABLET | Refills: 0 | Status: SHIPPED | OUTPATIENT
Start: 2021-12-18 | End: 2022-09-26

## 2021-12-18 RX ORDER — DOXYCYCLINE HYCLATE 100 MG
100 TABLET ORAL 2 TIMES DAILY
Qty: 14 TABLET | Refills: 0 | Status: SHIPPED | OUTPATIENT
Start: 2021-12-18 | End: 2021-12-25

## 2021-12-18 RX ORDER — FLUTICASONE PROPIONATE 50 MCG
1 SPRAY, SUSPENSION (ML) NASAL DAILY
Qty: 11.1 ML | Refills: 0 | Status: SHIPPED | OUTPATIENT
Start: 2021-12-18 | End: 2023-05-25

## 2021-12-18 NOTE — PATIENT INSTRUCTIONS
You may want to try a nasal lavage (also known as nasal irrigation). You can find over-the-counter products, such as Neti-Pot, at retail locations or make your own at home. Instructions for homemade nasal lavage and more information on the process are available online at http://www.aafp.org/afp/2009/1115/p1121.html.      When to Use Antibiotics    Antibiotics are medicines used to treat infections caused by bacteria. They don t work for an illness caused by a virus. And they don't work for an allergic reaction. In fact, taking antibiotics for reasons other than an infection by bacteria can cause problems. You may have side effects from the medicine. And if you need an antibiotic in the future, it may not work well. This is because the bacteria can become immune to the medicine. You can also get a type of diarrhea that's hard to treat. This diarrhea is called C. diff.   When antibiotics likely won t help  Your healthcare provider won t usually give you antibiotics for the conditions listed below. You can help by not asking for them if you have:     A cold. This type of illness is caused by a virus. It can cause a runny nose, stuffed-up nose, sneezing, coughing, and headache. You may also have mild body aches and low fever. A cold gets better on its own in a few days to a week.    The flu (influenza). This is a respiratory illness caused by a virus. The flu usually goes away on its own in a week or so. It can cause fever, body aches, sore throat, and tiredness.    Bronchitis. This is an infection in the lungs. It is most often caused by a virus. You may have coughing, phlegm, body aches, and a low fever. A common type of bronchitis is known as a chest cold. This is called acute bronchitis. This often happens after you have a respiratory infection like a cold. Bronchitis can take weeks to go away. Antibiotics often don t help.    Most sore throats. Sore throats are most often caused by viruses. Your throat may feel  scratchy or achy. It may hurt to swallow. You may also have a low fever and body aches. A sore throat usually gets better in a few days.    Most outer ear infections. An ear infection may be caused by a virus or bacteria. It causes pain in the ear. Antibiotics by mouth usually don t help. Low-dose antibiotic ear drops work much better.    Some inner ear infections. An inner ear infection (otitis media) can be caused by a virus in the ear. It can also cause pain and a high fever. Most older children with low-grade fever don't need to be treated with antibiotics.    Most sinus infections. This is also known as sinusitis. This kind of infection causes sinus pain and swelling, and a runny nose. In most cases, it goes away on its own. Antibiotics don t make recovery quicker.    Allergic rhinitis. This is a set of symptoms caused by an allergic reaction. You may have sneezing, a runny nose, itchy or watery eyes, or a sore throat. Allergies are not treated with antibiotics.    Low fever. A mild fever that s less than 100.4 F (38 C) most likely doesn t need to be treated with antibiotics.   When antibiotics can help  Antibiotics can be used to treat:                                                       Strep throat. This is a throat infection caused by a certain type of bacteria. Symptoms of strep throat include a sore throat, white patches on the tonsils, red spots on the roof of the mouth, fever, body aches, and nausea and vomiting. Strep throat almost never causes a cough.    Urinary tract infection (UTI). This is an infection of the bladder and the tube that takes urine out of the body. It is caused by bacteria. It can cause burning pain and urine that s cloudy or tinted with blood. UTIs are very common. Antibiotics usually help treat them.    Some outer ear infections. In some cases, a healthcare provider may prescribe antibiotics by mouth for an ear infection. You may need a test to show the cause of the ear  infection.    Some sinus infections. In some cases, your healthcare provider may give you antibiotics. He or she may first need to make sure your symptoms aren t caused by something else. This may be a virus, fungus, allergies, or air pollutants such as smoke.   Your healthcare provider may give you antibiotics if you have a condition that can affect your immune system. This includes diabetes or cancer.  Self-care at home  If your infection can t be treated with antibiotics, you can take other steps to feel better. Try the remedies below. In general:     Rest and sleep as much as needed.    Drink water and other clear fluids.    Don t smoke. Stay away from smoke from other people.    Use over-the-counter medicine such as acetaminophen or ibuprofen to ease pain or fever, as directed by your healthcare provider.  To treat sinus pain or nasal stuffiness:    Put a warm, moist cloth on your face where you feel sinus pain or pressure.    Try a nasal spray with medicine or saline. Use as directed by your healthcare provider.    Breathe in steam from a hot shower.    Use a humidifier or cool mist vaporizer.   To quiet a cough:     Use a humidifier or cool mist vaporizer.    Breathe in steam from a hot shower.    Suck on cough lozenges.   To sooth a sore throat:     Suck on ice chips, frozen ice pops, or lozenges.    Use a sore throat spray.    Use a humidifier or cool mist vaporizer.    Gargle with saltwater.    Drink warm liquids.    Take ibuprofen to reduce swelling and pain.  To ease ear pain:     Hold a warm, moist washcloth on the ear for 10 minutes at a time.  [employer] last reviewed this educational content on 12/1/2019 2000-2021 [employer]. All rights reserved. This information is not intended as a substitute for professional medical care. Always follow your healthcare professional's instructions.        Dear Marilee Weston    After reviewing your responses, I've been able to diagnose you with?a  sinus infection caused by bacteria.?     Based on your responses and diagnosis, I have prescribed Doxycycline, Flonase and steroid dose pack to treat your symptoms. I have sent this to your pharmacy.?     It is also important to stay well hydrated, get lots of rest and take over-the-counter decongestants,?tylenol?or ibuprofen if you?are able to?take those medications per your primary care provider to help relieve discomfort.?     It is important that you take?all of?your prescribed medication even if your symptoms are improving after a few doses.? Taking?all of?your medicine helps prevent the symptoms from returning.?     If your symptoms worsen, you develop severe headache, vomiting, high fever (>102), or are not improving in 7 days, please contact your primary care provider for an appointment or visit any of our convenient Walk-in Care or Urgent Care Centers to be seen which can be found on our website?here.?     Thanks again for choosing?us?as your health care partner,?   ?  Jose Alexandra PA-C?

## 2022-01-25 ENCOUNTER — MYC MEDICAL ADVICE (OUTPATIENT)
Dept: CARDIOLOGY | Facility: CLINIC | Age: 62
End: 2022-01-25
Payer: COMMERCIAL

## 2022-02-22 DIAGNOSIS — M43.16 SPONDYLOLISTHESIS OF LUMBAR REGION: ICD-10-CM

## 2022-02-22 RX ORDER — DULOXETIN HYDROCHLORIDE 30 MG/1
60 CAPSULE, DELAYED RELEASE ORAL DAILY
Qty: 60 CAPSULE | Refills: 1 | Status: SHIPPED | OUTPATIENT
Start: 2022-02-22 | End: 2022-04-29

## 2022-04-28 DIAGNOSIS — M43.16 SPONDYLOLISTHESIS OF LUMBAR REGION: ICD-10-CM

## 2022-04-29 RX ORDER — DULOXETIN HYDROCHLORIDE 30 MG/1
CAPSULE, DELAYED RELEASE ORAL
Qty: 60 CAPSULE | Refills: 1 | Status: SHIPPED | OUTPATIENT
Start: 2022-04-29 | End: 2022-07-01

## 2022-05-02 DIAGNOSIS — M54.50 LUMBAR SPINE PAIN: ICD-10-CM

## 2022-05-02 RX ORDER — CELECOXIB 200 MG/1
200 CAPSULE ORAL DAILY
Qty: 30 CAPSULE | Refills: 3 | Status: SHIPPED | OUTPATIENT
Start: 2022-05-02 | End: 2022-08-31

## 2022-05-09 DIAGNOSIS — R10.13 DYSPEPSIA: Primary | ICD-10-CM

## 2022-05-11 DIAGNOSIS — I48.0 PAROXYSMAL ATRIAL FIBRILLATION (H): ICD-10-CM

## 2022-05-11 NOTE — TELEPHONE ENCOUNTER
"Routing refill request to provider for review/approval because:  Medication is reported/historical    Last Written Prescription Date:  7/16/21  Last Fill Quantity: ?,  # refills: ?   Last office visit provider:  7/16/21     Requested Prescriptions   Pending Prescriptions Disp Refills     omeprazole (PRILOSEC) 20 MG DR capsule 90 capsule      Sig: Take 1 capsule (20 mg) by mouth       PPI Protocol Passed - 5/9/2022  3:20 PM        Passed - Not on Clopidogrel (unless Pantoprazole ordered)        Passed - No diagnosis of osteoporosis on record        Passed - Recent (12 mo) or future (30 days) visit within the authorizing provider's specialty     Patient has had an office visit with the authorizing provider or a provider within the authorizing providers department within the previous 12 mos or has a future within next 30 days. See \"Patient Info\" tab in inbasket, or \"Choose Columns\" in Meds & Orders section of the refill encounter.              Passed - Medication is active on med list        Passed - Patient is age 18 or older        Passed - No active pregnacy on record        Passed - No positive pregnancy test in past 12 months             Amanda aBllard, RN 05/11/22 6:54 PM  "

## 2022-05-12 RX ORDER — METOPROLOL SUCCINATE 50 MG/1
TABLET, EXTENDED RELEASE ORAL
Qty: 45 TABLET | Refills: 11 | Status: SHIPPED | OUTPATIENT
Start: 2022-05-12 | End: 2023-06-01

## 2022-05-12 NOTE — CONFIDENTIAL NOTE
Signed Prescriptions:                        Disp   Refills    metoprolol succinate ER (TOPROL XL) 50 MG *45 tab*11       Sig: TAKE 1 AND 1/2 TABLETS(75 MG) BY MOUTH DAILY  Authorizing Provider: CESARIO CHOPRA  Ordering User: RENEE HERNANDEZ    rx filled per refill protocol.  Renee Hernandez RN

## 2022-06-01 ENCOUNTER — TRANSFERRED RECORDS (OUTPATIENT)
Dept: HEALTH INFORMATION MANAGEMENT | Facility: CLINIC | Age: 62
End: 2022-06-01
Payer: COMMERCIAL

## 2022-06-30 DIAGNOSIS — M43.16 SPONDYLOLISTHESIS OF LUMBAR REGION: ICD-10-CM

## 2022-07-01 RX ORDER — DULOXETIN HYDROCHLORIDE 30 MG/1
CAPSULE, DELAYED RELEASE ORAL
Qty: 60 CAPSULE | Refills: 1 | Status: SHIPPED | OUTPATIENT
Start: 2022-07-01 | End: 2022-08-31

## 2022-07-19 ENCOUNTER — ANCILLARY PROCEDURE (OUTPATIENT)
Dept: MAMMOGRAPHY | Facility: CLINIC | Age: 62
End: 2022-07-19
Attending: INTERNAL MEDICINE
Payer: COMMERCIAL

## 2022-07-19 DIAGNOSIS — Z12.31 VISIT FOR SCREENING MAMMOGRAM: ICD-10-CM

## 2022-07-19 PROCEDURE — 77067 SCR MAMMO BI INCL CAD: CPT

## 2022-07-28 DIAGNOSIS — E89.0 POSTSURGICAL HYPOTHYROIDISM: ICD-10-CM

## 2022-07-28 NOTE — TELEPHONE ENCOUNTER
"Routing refill request to provider for review/approval because:  Labs not current:  tsh  Due to be seen    Last Written Prescription Date:  7/16/21  Last Fill Quantity: 90,  # refills: 3   Last office visit provider:  7/16/21     Requested Prescriptions   Pending Prescriptions Disp Refills     levothyroxine (SYNTHROID) 100 MCG tablet 90 tablet 3     Sig: Take 1 tablet (100 mcg) by mouth daily       Thyroid Protocol Failed - 7/28/2022  2:51 PM        Failed - Normal TSH on file in past 12 months     Recent Labs   Lab Test 07/16/21  0916   TSH 0.52              Passed - Patient is 12 years or older        Passed - Recent (12 mo) or future (30 days) visit within the authorizing provider's specialty     Patient has had an office visit with the authorizing provider or a provider within the authorizing providers department within the previous 12 mos or has a future within next 30 days. See \"Patient Info\" tab in inbasket, or \"Choose Columns\" in Meds & Orders section of the refill encounter.              Passed - Medication is active on med list        Passed - No active pregnancy on record     If patient is pregnant or has had a positive pregnancy test, please check TSH.          Passed - No positive pregnancy test in past 12 months     If patient is pregnant or has had a positive pregnancy test, please check TSH.               Amanda Ballard RN 07/28/22 6:47 PM  "

## 2022-07-29 RX ORDER — LEVOTHYROXINE SODIUM 100 UG/1
100 TABLET ORAL DAILY
Qty: 90 TABLET | Refills: 3 | Status: SHIPPED | OUTPATIENT
Start: 2022-07-29 | End: 2023-07-06

## 2022-08-30 DIAGNOSIS — M54.50 LUMBAR SPINE PAIN: ICD-10-CM

## 2022-08-30 DIAGNOSIS — M43.16 SPONDYLOLISTHESIS OF LUMBAR REGION: ICD-10-CM

## 2022-08-31 RX ORDER — CELECOXIB 200 MG/1
CAPSULE ORAL
Qty: 30 CAPSULE | Refills: 3 | Status: SHIPPED | OUTPATIENT
Start: 2022-08-31 | End: 2022-12-07

## 2022-08-31 RX ORDER — DULOXETIN HYDROCHLORIDE 30 MG/1
CAPSULE, DELAYED RELEASE ORAL
Qty: 60 CAPSULE | Refills: 1 | Status: SHIPPED | OUTPATIENT
Start: 2022-08-31 | End: 2022-11-15

## 2022-09-26 ENCOUNTER — VIRTUAL VISIT (OUTPATIENT)
Dept: INTERNAL MEDICINE | Facility: CLINIC | Age: 62
End: 2022-09-26
Payer: COMMERCIAL

## 2022-09-26 DIAGNOSIS — U07.1 INFECTION DUE TO 2019 NOVEL CORONAVIRUS: Primary | ICD-10-CM

## 2022-09-26 PROCEDURE — 99213 OFFICE O/P EST LOW 20 MIN: CPT | Mod: CS | Performed by: INTERNAL MEDICINE

## 2022-09-26 NOTE — PROGRESS NOTES
Marilee is a 62 year old who is being evaluated via a billable video visit.      How would you like to obtain your AVS? MyChart  If the video visit is dropped, the invitation should be resent by: Text to cell phone: [phone]  Will anyone else be joining your video visit? No        1. Infection due to 2019 novel coronavirus  - nirmatrelvir and ritonavir (PAXLOVID) therapy pack; Take 3 tablets by mouth 2 times daily for 5 days (Take 2 Nirmatrelvir tablets and 1 Ritonavir tablet twice daily for 5 days)  Dispense: 30 each; Refill: 0    Subjective   Marilee is a 62 year old, presenting for the following health issues:  Covid Concern (Tested positive 9/25/22)    HPI     This is a 62-year-old woman who developed symptoms of COVID on Saturday and had a positive test on Saturday.  She has had headache body aches subjective fever sinus congestion.  No significant breathing problems.  She has been fully vaccinated against COVID.    Review of Systems         Objective         Vitals:  No vitals were obtained today due to virtual visit.    Physical Exam                   Video-Visit Details    Video Start Time: 9:58 AM    Type of service:  Video Visit    Video End Time:10:09 AM    Originating Location (pt. Location): Home    Distant Location (provider location):  [facility]     Platform used for Video Visit: CQuotient

## 2022-11-15 DIAGNOSIS — M43.16 SPONDYLOLISTHESIS OF LUMBAR REGION: ICD-10-CM

## 2022-11-15 NOTE — TELEPHONE ENCOUNTER
Patient will need an appointment to get a refill of this medication or she can get this through her primary care provider.  At this dose, she should not stop all at once.  Please see how many days left of the prescription she has.

## 2022-11-17 RX ORDER — DULOXETIN HYDROCHLORIDE 30 MG/1
60 CAPSULE, DELAYED RELEASE ORAL DAILY
Qty: 60 CAPSULE | Refills: 0 | Status: SHIPPED | OUTPATIENT
Start: 2022-11-17 | End: 2022-12-07

## 2022-11-17 NOTE — TELEPHONE ENCOUNTER
Patient only has 10 days left.  She is scheduled to f/u for back pain 12/7 and will not have enough until then.  She would like 1 month until she is seen.

## 2022-12-07 ENCOUNTER — OFFICE VISIT (OUTPATIENT)
Dept: PHYSICAL MEDICINE AND REHAB | Facility: CLINIC | Age: 62
End: 2022-12-07
Payer: COMMERCIAL

## 2022-12-07 VITALS
DIASTOLIC BLOOD PRESSURE: 68 MMHG | WEIGHT: 155 LBS | SYSTOLIC BLOOD PRESSURE: 129 MMHG | HEART RATE: 61 BPM | HEIGHT: 68 IN | BODY MASS INDEX: 23.49 KG/M2

## 2022-12-07 DIAGNOSIS — M79.18 MYOFASCIAL PAIN: ICD-10-CM

## 2022-12-07 DIAGNOSIS — M43.16 SPONDYLOLISTHESIS OF LUMBAR REGION: ICD-10-CM

## 2022-12-07 DIAGNOSIS — M54.50 LUMBAR SPINE PAIN: ICD-10-CM

## 2022-12-07 DIAGNOSIS — M53.3 SACROILIAC JOINT PAIN: Primary | ICD-10-CM

## 2022-12-07 DIAGNOSIS — M51.369 DDD (DEGENERATIVE DISC DISEASE), LUMBAR: ICD-10-CM

## 2022-12-07 PROCEDURE — 99214 OFFICE O/P EST MOD 30 MIN: CPT | Performed by: PHYSICAL MEDICINE & REHABILITATION

## 2022-12-07 RX ORDER — DULOXETIN HYDROCHLORIDE 30 MG/1
60 CAPSULE, DELAYED RELEASE ORAL DAILY
Qty: 60 CAPSULE | Refills: 3 | Status: SHIPPED | OUTPATIENT
Start: 2022-12-07 | End: 2023-05-02

## 2022-12-07 RX ORDER — CELECOXIB 200 MG/1
CAPSULE ORAL
Qty: 30 CAPSULE | Refills: 3 | Status: SHIPPED | OUTPATIENT
Start: 2022-12-07 | End: 2023-06-02

## 2022-12-07 ASSESSMENT — PAIN SCALES - GENERAL: PAINLEVEL: MODERATE PAIN (4)

## 2022-12-07 NOTE — PATIENT INSTRUCTIONS
Continue with  Celebrex and Cymbalta  A Bilateral sacroiliac joint injection has been ordered today. Please schedule this injection at least 2 weeks from now to allow time for insurance prior authorization. On the day of your injection, you cannot be sick or taking antibiotics. If you become sick and are prescribed, please call the clinic so your injection can be rescheduled for once you have completed your antibiotics. You will need to bring a  with you for your injection. If you have any questions or concerns prior to your injection, please do not hesitate to call the nurse navigation line at [phone].

## 2022-12-07 NOTE — PROGRESS NOTES
Assessment/Plan:      Marilee was seen today for back pain.    Diagnoses and all orders for this visit:    Sacroiliac joint pain  -     PAIN Joint Injection Sacroiliac Joint Bilateral; Future  -     Physical Therapy Referral; Future    Myofascial pain  -     Physical Therapy Referral; Future    Spondylolisthesis of lumbar region  -     DULoxetine (CYMBALTA) 30 MG capsule; Take 2 capsules (60 mg) by mouth daily  -     Physical Therapy Referral; Future    DDD (degenerative disc disease), lumbar  -     Physical Therapy Referral; Future    Lumbar spine pain  -     celecoxib (CELEBREX) 200 MG capsule; TAKE 1 CAPSULE(200 MG) BY MOUTH DAILY Strength: 200 mg  -     Physical Therapy Referral; Future         Assessment: Pleasant 62 year old female with past medical history significant for depression and thyroid disorder, A. fib with:     1.      Improved and now worsening left greater than right sacral pain over the lower SI joints and GREGORIO region of the sacrum.  Was previously having gluteal and PSIS pain as well which has improved restarting Celebrex and physical therapy.  Her pain remains consistent with sacroiliac pain and less likely facet mediated L4-5 and L5-S1 with an L4-5 spondylolisthesis.  She also has myofascial pain in the gluteal region.  Pain is fairly tolerable with Cymbalta, Celebrex and home physical therapy program but still having significant pain over the sacroiliac joints.  Has positive Gaenslen's on the left today which is mild along with mild SHI test bilaterally.  Previously had 100% relief of the pain following bilateral sacroiliac joint injections in September 2021 but then pain returned after an acupuncture visit a few weeks later and is still very aggravating for her.      2.  Myofascial pain gluteal region greater trochanters.   Remains improved with medication.      Discussion:    1.  I discussed the diagnosis and treatment options.  We discussed options of returning to physical therapy,  medications, injections, further imaging.  Her pain is persistent and aggravating for her.  She would like to try additional treatment as she had such improvement with the injections although it was temporary after she reaggravated her pain.    2.  Recommend bilateral sacroiliac joint injections as last injection was over a year ago when she has done very well initially following that injection.    3.  Continue Cymbalta 60 mg daily.    4.  Continue Celebrex 200 mg daily.    5.  We will reorder physical therapy to readdress her home exercises for SI stabilization and she may continue with home exercises otherwise.    6.  Follow-up with me  4 weeks after injection.  Can consider MRI of the sacrum/SI joints in the future.      It was our pleasure caring for your patient today, if there any questions or concerns please do not hesitate to contact us.      Subjective:   Patient ID: Marilee Weston is a 62 year old female.    History of Present Illness: Patient presents for follow-up of low back pain/sacral pain.  Has not been seen for quite some time.  She had sacroiliac joint injections in September 2021.  Then went to [place].  She had 100% relief of symptoms for at least 2 weeks and had some acupuncture and cupping and the pain returned immediately after that treatment.  She has had pain at the SI joints and sacrum bilaterally worse with prolonged standing or sitting better with exercise riding her horse and walking.  Takes duloxetine 60 mg daily which takes the edge off the pain and makes her happier person and also takes Celebrex 200 mg daily also feels very good with that.  Her pain is an 8/10 at worst 4/10 today 3/10 at best.  No radiation down legs paresthesias or weakness.  She would like refill of medications.  She had physical therapy in the past and has home exercises.    Imaging: I personally reviewed the MRI lumbar spine for medical decision-making purposes.  Moderate disc height loss L4-5 mild at L5-S1 and  "L2-3.  L5-S1 moderate facet arthropathy and moderate left foraminal stenosis.  Severe right foraminal stenosis L4-5 central disc bulge with annular tear at L3-4.  Chronic Schmorl's nodes L1-L2 and L4.    Review of Systems: Pertinent positives: Complains of weakness in her back.  Pertinent negatives: No paresthesias.  No bowel or bladder incontinence.  No urinary retention.  No fevers, unintentional weight loss, balance changes, headaches, frequent falling, difficulty swallowing, or coordination difficulties.  All others reviewed are negative.    Past Medical History:   Diagnosis Date     Atrial fibrillation (H) 2010     Depression      Fibrocystic breast      Osteopenia      Osteopenia      Paroxysmal atrial fibrillation (H)      Postsurgical hypothyroidism        The following portions of the patient's history were reviewed and updated as appropriate: allergies, current medications, past family history, past medical history, past social history, past surgical history and problem list.           Objective:   Physical Exam:    /68   Pulse 61   Ht 5' 8\" (1.727 m)   Wt 155 lb (70.3 kg)   BMI 23.57 kg/m    Body mass index is 23.57 kg/m .      General: Alert and oriented with normal affect. Attention, knowledge, memory, and language are intact. No acute distress.     CV: No lower extremity edema.  Skin: No rashes seen.    Gait:  Nonantalgic, cautious.  Tenderness of the left greater than right SI joint GREGORIO.  Mild positive Gaenslen's on the left positive Isidro's bilaterally.  Equivocal thigh thrust bilaterally.  Negative AP distraction and SI compression.    Sensation is intact to light touch throughout the   lower extremities.  Reflexes are  2+ patellar and Achilles with downgoing toes.    Manual muscle testing reveals:  Right /Left out of 5     5/5 hip flexors  5/5 knee flexors  5/5 knee extensors  5/5 ankle plantar flexors  5/5 ankle dorsiflexors  5/5 ankle evertors  "

## 2022-12-07 NOTE — LETTER
12/7/2022         RE: Marilee Weston  [address] [facility] [address]        Dear Colleague,    Thank you for referring your patient, Marilee Weston, to the [facility]. Please see a copy of my visit note below.    Assessment/Plan:      Marilee was seen today for back pain.    Diagnoses and all orders for this visit:    Sacroiliac joint pain  -     PAIN Joint Injection Sacroiliac Joint Bilateral; Future  -     Physical Therapy Referral; Future    Myofascial pain  -     Physical Therapy Referral; Future    Spondylolisthesis of lumbar region  -     DULoxetine (CYMBALTA) 30 MG capsule; Take 2 capsules (60 mg) by mouth daily  -     Physical Therapy Referral; Future    DDD (degenerative disc disease), lumbar  -     Physical Therapy Referral; Future    Lumbar spine pain  -     celecoxib (CELEBREX) 200 MG capsule; TAKE 1 CAPSULE(200 MG) BY MOUTH DAILY Strength: 200 mg  -     Physical Therapy Referral; Future         Assessment: Pleasant 62 year old female with past medical history significant for depression and thyroid disorder, A. fib with:     1.      Improved and now worsening left greater than right sacral pain over the lower SI joints and GREGORIO region of the sacrum.  Was previously having gluteal and PSIS pain as well which has improved restarting Celebrex and physical therapy.  Her pain remains consistent with sacroiliac pain and less likely facet mediated L4-5 and L5-S1 with an L4-5 spondylolisthesis.  She also has myofascial pain in the gluteal region.  Pain is fairly tolerable with Cymbalta, Celebrex and home physical therapy program but still having significant pain over the sacroiliac joints.  Has positive Gaenslen's on the left today which is mild along with mild SHI test bilaterally.  Previously had 100% relief of the pain following bilateral sacroiliac joint injections in September 2021 but then pain returned after an acupuncture visit a few weeks later and is still very  aggravating for her.      2.  Myofascial pain gluteal region greater trochanters.   Remains improved with medication.      Discussion:    1.  I discussed the diagnosis and treatment options.  We discussed options of returning to physical therapy, medications, injections, further imaging.  Her pain is persistent and aggravating for her.  She would like to try additional treatment as she had such improvement with the injections although it was temporary after she reaggravated her pain.    2.  Recommend bilateral sacroiliac joint injections as last injection was over a year ago when she has done very well initially following that injection.    3.  Continue Cymbalta 60 mg daily.    4.  Continue Celebrex 200 mg daily.    5.  We will reorder physical therapy to readdress her home exercises for SI stabilization and she may continue with home exercises otherwise.    6.  Follow-up with me  4 weeks after injection.  Can consider MRI of the sacrum/SI joints in the future.      It was our pleasure caring for your patient today, if there any questions or concerns please do not hesitate to contact us.      Subjective:   Patient ID: Marilee Weston is a 62 year old female.    History of Present Illness: Patient presents for follow-up of low back pain/sacral pain.  Has not been seen for quite some time.  She had sacroiliac joint injections in September 2021.  Then went to [place].  She had 100% relief of symptoms for at least 2 weeks and had some acupuncture and cupping and the pain returned immediately after that treatment.  She has had pain at the SI joints and sacrum bilaterally worse with prolonged standing or sitting better with exercise riding her horse and walking.  Takes duloxetine 60 mg daily which takes the edge off the pain and makes her happier person and also takes Celebrex 200 mg daily also feels very good with that.  Her pain is an 8/10 at worst 4/10 today 3/10 at best.  No radiation down legs paresthesias or  "weakness.  She would like refill of medications.  She had physical therapy in the past and has home exercises.    Imaging: I personally reviewed the MRI lumbar spine for medical decision-making purposes.  Moderate disc height loss L4-5 mild at L5-S1 and L2-3.  L5-S1 moderate facet arthropathy and moderate left foraminal stenosis.  Severe right foraminal stenosis L4-5 central disc bulge with annular tear at L3-4.  Chronic Schmorl's nodes L1-L2 and L4.    Review of Systems: Pertinent positives: Complains of weakness in her back.  Pertinent negatives: No paresthesias.  No bowel or bladder incontinence.  No urinary retention.  No fevers, unintentional weight loss, balance changes, headaches, frequent falling, difficulty swallowing, or coordination difficulties.  All others reviewed are negative.    Past Medical History:   Diagnosis Date     Atrial fibrillation (H) 2010     Depression      Fibrocystic breast      Osteopenia      Osteopenia      Paroxysmal atrial fibrillation (H)      Postsurgical hypothyroidism        The following portions of the patient's history were reviewed and updated as appropriate: allergies, current medications, past family history, past medical history, past social history, past surgical history and problem list.           Objective:   Physical Exam:    /68   Pulse 61   Ht 5' 8\" (1.727 m)   Wt 155 lb (70.3 kg)   BMI 23.57 kg/m    Body mass index is 23.57 kg/m .      General: Alert and oriented with normal affect. Attention, knowledge, memory, and language are intact. No acute distress.     CV: No lower extremity edema.  Skin: No rashes seen.    Gait:  Nonantalgic, cautious.  Tenderness of the left greater than right SI joint GREGORIO.  Mild positive Gaenslen's on the left positive Isidro's bilaterally.  Equivocal thigh thrust bilaterally.  Negative AP distraction and SI compression.    Sensation is intact to light touch throughout the   lower extremities.  Reflexes are  2+ patellar and " Achilles with downgoing toes.    Manual muscle testing reveals:  Right /Left out of 5     5/5 hip flexors  5/5 knee flexors  5/5 knee extensors  5/5 ankle plantar flexors  5/5 ankle dorsiflexors  5/5 ankle evertors      Again, thank you for allowing me to participate in the care of your patient.        Sincerely,        David Malagon, DO

## 2023-01-14 ENCOUNTER — HEALTH MAINTENANCE LETTER (OUTPATIENT)
Age: 63
End: 2023-01-14

## 2023-01-24 ENCOUNTER — LAB REQUISITION (OUTPATIENT)
Dept: LAB | Facility: CLINIC | Age: 63
End: 2023-01-24

## 2023-01-24 ENCOUNTER — RADIOLOGY INJECTION OFFICE VISIT (OUTPATIENT)
Dept: PHYSICAL MEDICINE AND REHAB | Facility: CLINIC | Age: 63
End: 2023-01-24
Attending: PHYSICAL MEDICINE & REHABILITATION
Payer: COMMERCIAL

## 2023-01-24 VITALS
OXYGEN SATURATION: 98 % | TEMPERATURE: 98.2 F | SYSTOLIC BLOOD PRESSURE: 128 MMHG | DIASTOLIC BLOOD PRESSURE: 80 MMHG | HEART RATE: 58 BPM | RESPIRATION RATE: 16 BRPM

## 2023-01-24 DIAGNOSIS — Z13.220 ENCOUNTER FOR SCREENING FOR LIPOID DISORDERS: ICD-10-CM

## 2023-01-24 DIAGNOSIS — M53.3 SACROILIAC JOINT PAIN: ICD-10-CM

## 2023-01-24 DIAGNOSIS — Z13.1 ENCOUNTER FOR SCREENING FOR DIABETES MELLITUS: ICD-10-CM

## 2023-01-24 DIAGNOSIS — E89.0 POSTPROCEDURAL HYPOTHYROIDISM: ICD-10-CM

## 2023-01-24 LAB
CHOLEST SERPL-MCNC: 169 MG/DL
HBA1C MFR BLD: 5.4 %
HDLC SERPL-MCNC: 79 MG/DL
LDLC SERPL CALC-MCNC: 73 MG/DL
NONHDLC SERPL-MCNC: 90 MG/DL
TRIGL SERPL-MCNC: 83 MG/DL
TSH SERPL DL<=0.005 MIU/L-ACNC: 0.62 UIU/ML (ref 0.3–4.2)

## 2023-01-24 PROCEDURE — 27096 INJECT SACROILIAC JOINT: CPT | Mod: 50 | Performed by: PAIN MEDICINE

## 2023-01-24 PROCEDURE — 84443 ASSAY THYROID STIM HORMONE: CPT | Performed by: OBSTETRICS & GYNECOLOGY

## 2023-01-24 PROCEDURE — 83036 HEMOGLOBIN GLYCOSYLATED A1C: CPT | Performed by: OBSTETRICS & GYNECOLOGY

## 2023-01-24 PROCEDURE — A9576 INJ PROHANCE MULTIPACK: HCPCS | Performed by: PAIN MEDICINE

## 2023-01-24 PROCEDURE — 80061 LIPID PANEL: CPT | Performed by: OBSTETRICS & GYNECOLOGY

## 2023-01-24 RX ORDER — METHYLPREDNISOLONE ACETATE 40 MG/ML
INJECTION, SUSPENSION INTRA-ARTICULAR; INTRALESIONAL; INTRAMUSCULAR; SOFT TISSUE
Status: COMPLETED | OUTPATIENT
Start: 2023-01-24 | End: 2023-01-24

## 2023-01-24 RX ORDER — ROPIVACAINE HYDROCHLORIDE 5 MG/ML
INJECTION, SOLUTION EPIDURAL; INFILTRATION; PERINEURAL
Status: COMPLETED | OUTPATIENT
Start: 2023-01-24 | End: 2023-01-24

## 2023-01-24 RX ORDER — LIDOCAINE HYDROCHLORIDE 10 MG/ML
INJECTION, SOLUTION EPIDURAL; INFILTRATION; INTRACAUDAL; PERINEURAL
Status: COMPLETED | OUTPATIENT
Start: 2023-01-24 | End: 2023-01-24

## 2023-01-24 RX ADMIN — METHYLPREDNISOLONE ACETATE 40 MG: 40 INJECTION, SUSPENSION INTRA-ARTICULAR; INTRALESIONAL; INTRAMUSCULAR; SOFT TISSUE at 15:46

## 2023-01-24 RX ADMIN — ROPIVACAINE HYDROCHLORIDE 2.5 ML: 5 INJECTION, SOLUTION EPIDURAL; INFILTRATION; PERINEURAL at 15:45

## 2023-01-24 RX ADMIN — LIDOCAINE HYDROCHLORIDE 2 ML: 10 INJECTION, SOLUTION EPIDURAL; INFILTRATION; INTRACAUDAL; PERINEURAL at 15:44

## 2023-01-24 ASSESSMENT — PAIN SCALES - GENERAL
PAINLEVEL: MODERATE PAIN (4)
PAINLEVEL: MODERATE PAIN (4)

## 2023-01-24 NOTE — PATIENT INSTRUCTIONS
DISCHARGE INSTRUCTIONS    During office hours (8:00 a.m.- 4:00 p.m.) questions or concerns may be answered  by calling [facility] Navigation Nurses at  [phone].  Messages received after hours will be returned the following business day.      In the case of an emergency, please dial 911 or seek assistance at the nearest Emergency Room/Urgent Care facility.     All Patients:    You may experience an increase in your symptoms for the first 2 days (It may take anywhere between 2 days- 2 weeks for the steroid to have maximum effect).    You may use ice on the injection site, as frequently as 20 minutes each hour if needed.    You may take your pain medicine.    You may continue taking your regular medication after your injection. If you have had a Medial Branch Block you may resume pain medication once your pain diary is completed.    You may shower. No swimming, tub bath or hot tub for 48 hours.  You may remove your bandaid/bandage as soon as you are home.    You may resume light activities, as tolerated.    Resume your usual diet as tolerated.    It is strongly advised that you do not drive for 1-3 hours post injection.    If you have had oral sedation:  Do not drive for 8 hours post injection.      If you have had IV sedation:  Do not drive for 24 hours post injection.  Do not operate hazardous machinery or make important personal/business decisions for 24 hours.      POSSIBLE STEROID SIDE EFFECTS (If steroid/cortisone was used for your procedure)    -If you experience these symptoms, it should only last for a short period    Swelling of the legs              Skin redness (flushing)     Mouth (oral) irritation   Blood sugar (glucose) levels            Sweats                    Mood changes  Headache  Sleeplessness  Weakened immune system for up to 14 days, which could increase the risk of jaylyn the COVID-19 virus and/or experiencing more severe symptoms of the disease, if exposed.  Decreased  effectiveness of the flu vaccine if given within 2 weeks of the steroid.         POSSIBLE PROCEDURE SIDE EFFECTS  -Call the Spine Center if you are concerned  Increased Pain           Increased numbness/tingling      Nausea/Vomiting          Bruising/bleeding at site      Redness or swelling                                              Difficulty walking      Weakness           Fever greater than 100.5    *In the event of a severe headache after an epidural steroid injection that is relieved by lying down, please call the [facility] to speak with a clinical staff member*

## 2023-01-25 LAB — HOLD SPECIMEN: NORMAL

## 2023-05-02 DIAGNOSIS — M43.16 SPONDYLOLISTHESIS OF LUMBAR REGION: ICD-10-CM

## 2023-05-02 RX ORDER — DULOXETIN HYDROCHLORIDE 30 MG/1
60 CAPSULE, DELAYED RELEASE ORAL DAILY
Qty: 60 CAPSULE | Refills: 3 | Status: SHIPPED | OUTPATIENT
Start: 2023-05-02 | End: 2023-07-06

## 2023-05-25 ENCOUNTER — OFFICE VISIT (OUTPATIENT)
Dept: INTERNAL MEDICINE | Facility: CLINIC | Age: 63
End: 2023-05-25
Payer: MEDICARE

## 2023-05-25 VITALS
DIASTOLIC BLOOD PRESSURE: 80 MMHG | WEIGHT: 154 LBS | OXYGEN SATURATION: 99 % | SYSTOLIC BLOOD PRESSURE: 138 MMHG | HEART RATE: 62 BPM | TEMPERATURE: 98.2 F | HEIGHT: 68 IN | BODY MASS INDEX: 23.34 KG/M2 | RESPIRATION RATE: 15 BRPM

## 2023-05-25 DIAGNOSIS — R07.9 CHEST PAIN, UNSPECIFIED TYPE: ICD-10-CM

## 2023-05-25 DIAGNOSIS — E89.0 POSTSURGICAL HYPOTHYROIDISM: ICD-10-CM

## 2023-05-25 DIAGNOSIS — I48.0 PAROXYSMAL ATRIAL FIBRILLATION (H): ICD-10-CM

## 2023-05-25 DIAGNOSIS — M85.80 OSTEOPENIA, UNSPECIFIED LOCATION: ICD-10-CM

## 2023-05-25 DIAGNOSIS — Z13.220 SCREENING FOR HYPERLIPIDEMIA: ICD-10-CM

## 2023-05-25 DIAGNOSIS — M79.601 BILATERAL ARM PAIN: ICD-10-CM

## 2023-05-25 DIAGNOSIS — M79.602 BILATERAL ARM PAIN: ICD-10-CM

## 2023-05-25 DIAGNOSIS — M79.10 MYALGIA: ICD-10-CM

## 2023-05-25 DIAGNOSIS — M48.02 CERVICAL STENOSIS OF SPINAL CANAL: ICD-10-CM

## 2023-05-25 DIAGNOSIS — R29.2 HYPERREFLEXIA: ICD-10-CM

## 2023-05-25 DIAGNOSIS — M54.2 NECK PAIN: ICD-10-CM

## 2023-05-25 DIAGNOSIS — R68.89 DECREASED EXERCISE TOLERANCE: ICD-10-CM

## 2023-05-25 DIAGNOSIS — Z00.00 ANNUAL PHYSICAL EXAM: Primary | ICD-10-CM

## 2023-05-25 DIAGNOSIS — E89.0 H/O THYROIDECTOMY: ICD-10-CM

## 2023-05-25 LAB
ALBUMIN SERPL BCG-MCNC: 4.5 G/DL (ref 3.5–5.2)
ALBUMIN UR-MCNC: NEGATIVE MG/DL
ALP SERPL-CCNC: 50 U/L (ref 35–104)
ALT SERPL W P-5'-P-CCNC: 24 U/L (ref 10–35)
AMORPH CRY #/AREA URNS HPF: ABNORMAL /HPF
ANION GAP SERPL CALCULATED.3IONS-SCNC: 11 MMOL/L (ref 7–15)
APPEARANCE UR: CLEAR
AST SERPL W P-5'-P-CCNC: 30 U/L (ref 10–35)
B BURGDOR IGG+IGM SER QL: 0.04
BACTERIA #/AREA URNS HPF: ABNORMAL /HPF
BILIRUB SERPL-MCNC: 0.6 MG/DL
BILIRUB UR QL STRIP: NEGATIVE
BUN SERPL-MCNC: 18.8 MG/DL (ref 8–23)
CALCIUM SERPL-MCNC: 8.9 MG/DL (ref 8.8–10.2)
CHLORIDE SERPL-SCNC: 106 MMOL/L (ref 98–107)
CHOLEST SERPL-MCNC: 192 MG/DL
CK SERPL-CCNC: 61 U/L (ref 26–192)
COLOR UR AUTO: YELLOW
CREAT SERPL-MCNC: 0.65 MG/DL (ref 0.51–0.95)
CRP SERPL-MCNC: <3 MG/L
DEPRECATED HCO3 PLAS-SCNC: 26 MMOL/L (ref 22–29)
ERYTHROCYTE [DISTWIDTH] IN BLOOD BY AUTOMATED COUNT: 12.5 % (ref 10–15)
ERYTHROCYTE [SEDIMENTATION RATE] IN BLOOD BY WESTERGREN METHOD: 8 MM/HR (ref 0–30)
GFR SERPL CREATININE-BSD FRML MDRD: >90 ML/MIN/1.73M2
GLUCOSE SERPL-MCNC: 91 MG/DL (ref 70–99)
GLUCOSE UR STRIP-MCNC: NEGATIVE MG/DL
HCT VFR BLD AUTO: 41.9 % (ref 35–47)
HDLC SERPL-MCNC: 81 MG/DL
HGB BLD-MCNC: 13.5 G/DL (ref 11.7–15.7)
HGB UR QL STRIP: NEGATIVE
KETONES UR STRIP-MCNC: NEGATIVE MG/DL
LDLC SERPL CALC-MCNC: 100 MG/DL
LEUKOCYTE ESTERASE UR QL STRIP: ABNORMAL
MCH RBC QN AUTO: 31.3 PG (ref 26.5–33)
MCHC RBC AUTO-ENTMCNC: 32.2 G/DL (ref 31.5–36.5)
MCV RBC AUTO: 97 FL (ref 78–100)
NITRATE UR QL: NEGATIVE
NONHDLC SERPL-MCNC: 111 MG/DL
PH UR STRIP: 7 [PH] (ref 5–8)
PLATELET # BLD AUTO: 177 10E3/UL (ref 150–450)
POTASSIUM SERPL-SCNC: 4.7 MMOL/L (ref 3.4–5.3)
PROT SERPL-MCNC: 7.2 G/DL (ref 6.4–8.3)
RBC # BLD AUTO: 4.32 10E6/UL (ref 3.8–5.2)
RBC #/AREA URNS AUTO: ABNORMAL /HPF
SODIUM SERPL-SCNC: 143 MMOL/L (ref 136–145)
SP GR UR STRIP: 1.02 (ref 1–1.03)
SQUAMOUS #/AREA URNS AUTO: ABNORMAL /LPF
TRIGL SERPL-MCNC: 56 MG/DL
TSH SERPL DL<=0.005 MIU/L-ACNC: 0.65 UIU/ML (ref 0.3–4.2)
URATE SERPL-MCNC: 2.9 MG/DL (ref 2.4–5.7)
UROBILINOGEN UR STRIP-ACNC: 0.2 E.U./DL
WBC # BLD AUTO: 4.2 10E3/UL (ref 4–11)
WBC #/AREA URNS AUTO: ABNORMAL /HPF

## 2023-05-25 PROCEDURE — G0402 INITIAL PREVENTIVE EXAM: HCPCS | Performed by: INTERNAL MEDICINE

## 2023-05-25 PROCEDURE — 80053 COMPREHEN METABOLIC PANEL: CPT | Performed by: INTERNAL MEDICINE

## 2023-05-25 PROCEDURE — 81001 URINALYSIS AUTO W/SCOPE: CPT | Performed by: INTERNAL MEDICINE

## 2023-05-25 PROCEDURE — 36415 COLL VENOUS BLD VENIPUNCTURE: CPT | Performed by: INTERNAL MEDICINE

## 2023-05-25 PROCEDURE — 85652 RBC SED RATE AUTOMATED: CPT | Performed by: INTERNAL MEDICINE

## 2023-05-25 PROCEDURE — 82550 ASSAY OF CK (CPK): CPT | Performed by: INTERNAL MEDICINE

## 2023-05-25 PROCEDURE — 86200 CCP ANTIBODY: CPT | Performed by: INTERNAL MEDICINE

## 2023-05-25 PROCEDURE — 84550 ASSAY OF BLOOD/URIC ACID: CPT | Performed by: INTERNAL MEDICINE

## 2023-05-25 PROCEDURE — 90677 PCV20 VACCINE IM: CPT | Performed by: INTERNAL MEDICINE

## 2023-05-25 PROCEDURE — 85027 COMPLETE CBC AUTOMATED: CPT | Performed by: INTERNAL MEDICINE

## 2023-05-25 PROCEDURE — 84443 ASSAY THYROID STIM HORMONE: CPT | Performed by: INTERNAL MEDICINE

## 2023-05-25 PROCEDURE — 99214 OFFICE O/P EST MOD 30 MIN: CPT | Mod: 25 | Performed by: INTERNAL MEDICINE

## 2023-05-25 PROCEDURE — 80061 LIPID PANEL: CPT | Performed by: INTERNAL MEDICINE

## 2023-05-25 PROCEDURE — 86431 RHEUMATOID FACTOR QUANT: CPT | Performed by: INTERNAL MEDICINE

## 2023-05-25 PROCEDURE — G0009 ADMIN PNEUMOCOCCAL VACCINE: HCPCS | Performed by: INTERNAL MEDICINE

## 2023-05-25 PROCEDURE — 86140 C-REACTIVE PROTEIN: CPT | Performed by: INTERNAL MEDICINE

## 2023-05-25 PROCEDURE — 86618 LYME DISEASE ANTIBODY: CPT | Performed by: INTERNAL MEDICINE

## 2023-05-25 ASSESSMENT — PAIN SCALES - GENERAL: PAINLEVEL: MILD PAIN (2)

## 2023-05-25 NOTE — PROGRESS NOTES
SUBJECTIVE:   Marilee is a 63 year old who presents for Preventive Visit.      5/25/2023     7:21 AM   Additional Questions   Roomed by Yaya LOJA   Patient has been advised of split billing requirements and indicates understanding: Yes  Are you in the first 12 months of your Medicare coverage?  Yes,  Visual Acuity:  Right Eye: 20/20   Left Eye: 20/20  Both Eyes: 20/20    HPI      Have you ever done Advance Care Planning? (For example, a Health Directive, POLST, or a discussion with a medical provider or your loved ones about your wishes): Yes, patient states has an Advance Care Planning document and will bring a copy to the clinic.    Ability to successfully perform ADLs: Yes  Hearing impairment: No  Home Safety: Safe  Fall Risk: Fall risk  Fallen 2 or more times in the past year?: No  Any fall with injury in the past year?: No    Cognitive Screening   1) Repeat 3 items (Leader, Season, Table)    2) Clock draw: NORMAL  3) 3 item recall: Recalls 3 objects  Results: 3 items recalled: COGNITIVE IMPAIRMENT LESS LIKELY    Mini-CogTM Copyright ANABELL Wilkerson. Licensed by the author for use in [facility]; reprinted with permission (soob@[facility]). All rights reserved.      Do you have sleep apnea, excessive snoring or daytime drowsiness?: no    Reviewed and updated as needed this visit by clinical staff   Tobacco  Allergies  Meds              Reviewed and updated as needed this visit by Provider                 Social History     Tobacco Use     Smoking status: Never     Smokeless tobacco: Never   Vaping Use     Vaping status: Not on file   Substance Use Topics     Alcohol use: Yes     Alcohol/week: 7.0 standard drinks of alcohol     Comment: Occ.            View : No data to display.              Do you have a current opioid prescription? No  Do you use any other controlled substances or medications that are not prescribed by a provider? Alcohol    Current providers sharing in care for this patient include:  "  Patient Care Team:  Chang Vora MD as PCP - General  Mayra Burns MD as MD (Cardiology)  Kelley Petit, RN as Specialty Care Coordinator (Cardiology)  Chang Vora MD as Assigned PCP  David Malagon DO as Assigned Neuroscience Provider  Mayra Burns MD as Assigned Heart and Vascular Provider    The following health maintenance items are reviewed in Epic and correct as of today:  Health Maintenance   Topic Date Due     ADVANCE CARE PLANNING  Never done     DTAP/TDAP/TD IMMUNIZATION (1 - Tdap) Never done     COVID-19 Vaccine (5 - Pfizer series) 07/26/2022     Pneumococcal Vaccine: 65+ Years (1 - PCV) 05/04/2023     MAMMO SCREENING  07/19/2023     COLORECTAL CANCER SCREENING  11/12/2023     MEDICARE ANNUAL WELLNESS VISIT  01/24/2024     TSH W/FREE T4 REFLEX  01/24/2024     ANNUAL REVIEW OF HM ORDERS  05/25/2024     FALL RISK ASSESSMENT  05/25/2024     LIPID  01/24/2028     DEXA  06/13/2033     PHQ-2 (once per calendar year)  Completed     INFLUENZA VACCINE  Completed     ZOSTER IMMUNIZATION  Completed     IPV IMMUNIZATION  Aged Out     MENINGITIS IMMUNIZATION  Aged Out     HEPATITIS C SCREENING  Discontinued     HIV SCREENING  Discontinued     PAP  Discontinued           FHS-7:        View : No data to display.                  Pertinent mammograms are reviewed under the imaging tab.    Review of Systems  Constitutional, HEENT, cardiovascular, pulmonary, GI, , musculoskeletal, neuro, skin, endocrine and psych systems are negative, except as otherwise noted.    OBJECTIVE:   BP (!) 142/78 (BP Location: Left arm, Patient Position: Sitting, Cuff Size: Adult Small)   Pulse 62   Temp 98.2  F (36.8  C) (Tympanic)   Resp 15   Ht 1.727 m (5' 8\")   Wt 69.9 kg (154 lb)   SpO2 99%   BMI 23.42 kg/m   Estimated body mass index is 23.42 kg/m  as calculated from the following:    Height as of this encounter: 1.727 m (5' 8\").    Weight as of this encounter: 69.9 kg (154 lb).  Physical Exam  EYES: Eyelids, " conjunctiva, and sclera were normal. Pupils were normal. Cornea, iris, and lens were normal bilaterally.  HEAD, EARS, NOSE, MOUTH, AND THROAT: Head and face were normal. Hearing was normal to voice and the ears were normal to external exam. Nose appearance was normal and there was no discharge. Oropharynx was normal.  NECK: Neck appearance was normal. There were no neck masses and the thyroid was not enlarged.  RESPIRATORY: Breathing pattern was normal and the chest moved symmetrically.  Percussion/auscultatory percussion was normal.  Lung sounds were normal and there were no abnormal sounds.  CARDIOVASCULAR: Heart rate and rhythm were normal.  S1 and S2 were normal and there were no extra sounds or murmurs. Peripheral pulses in arms and legs were normal.  Jugular venous pressure was normal.  There was no peripheral edema.  GASTROINTESTINAL: The abdomen was normal in contour.  Bowel sounds were present.  Percussion detected no organ enlargement or tenderness.  Palpation detected no tenderness, mass, or enlarged organs.   MUSCULOSKELETAL: Skeletal configuration was normal and muscle mass was normal for age. Joint appearance was overall normal.  LYMPHATIC: There were no enlarged nodes.  SKIN/HAIR/NAILS: Skin color was normal.  There were no skin lesions.  Hair and nails were normal.  NEUROLOGIC: The patient was alert and oriented to person, place, time, and circumstance. Speech was normal. Cranial nerves were normal. Motor strength was normal for age. The patient was normally coordinated.  PSYCHIATRIC:  Mood and affect were normal and the patient had normal recent and remote memory. The patient's judgment and insight were normal.    ASSESSMENT / PLAN:   1. Annual physical exam  This is a 63-year-old woman with issues as discussed below    2. Screening for hyperlipidemia  - Lipid panel reflex to direct LDL Fasting; Future  - Lipid panel reflex to direct LDL Fasting    3. Osteopenia, unspecified location  Bone density  is scheduled    4. Paroxysmal atrial fibrillation (H)  Continue management per Dr. Burns    5. Postsurgical hypothyroidism  6. H/O thyroidectomy  Continue levothyroxine    7. Chest pain, unspecified type  Patient with some chest discomfort and decreased exercise tolerance, last had a cardiac MRI in 2019 which looked okay and she had an acute ischemic evaluation in [place] which we reviewed which looked okay.  I think the neck step is for a treadmill nuclear medicine study  - NM MPI Treadmill; Future    8. Decreased exercise tolerance  - NM MPI Treadmill; Future    9. Neck pain  Patient with neck pain shoulder pain pain radiating down to her arm some hyperreflexia, known lumbar spinal stenosis.  We will evaluate her cervical spine for any cervical stenosis.  Additional evaluation below for some of her myalgia type symptoms  - MR Cervical Spine w/o Contrast; Future    10. Bilateral arm pain  - MR Cervical Spine w/o Contrast; Future    11. Hyperreflexia  - MR Cervical Spine w/o Contrast; Future    12. Myalgia  Discussed continued use of Celebrex, adding acetaminophen she is also on duloxetine  - CBC with platelets; Future  - Comprehensive metabolic panel; Future  - TSH with free T4 reflex; Future  - UA Macroscopic with reflex to Microscopic and Culture; Future  - Uric acid; Future  - Rheumatoid factor; Future  - Cyclic Citrullinated Peptide Antibody IgG; Future  - CRP, inflammation; Future  - ESR: Erythrocyte sedimentation rate; Future  - CK total; Future  - Lyme Disease Total Abs Bld with Reflex to Confirm CLIA; Future  - UA Macroscopic with reflex to Microscopic and Culture  - CBC with platelets  - Comprehensive metabolic panel  - TSH with free T4 reflex  - Uric acid  - Rheumatoid factor  - Cyclic Citrullinated Peptide Antibody IgG  - CRP, inflammation  - ESR: Erythrocyte sedimentation rate  - CK total  - Lyme Disease Total Abs Bld with Reflex to Confirm CLIA  - UA Microscopic with Reflex to  Culture      COUNSELING:  Reviewed preventive health counseling, as reflected in patient instructions        She reports that she has never smoked. She has never used smokeless tobacco.      Appropriate preventive services were discussed with this patient, including applicable screening as appropriate for cardiovascular disease, diabetes, osteopenia/osteoporosis, and glaucoma.  As appropriate for age/gender, discussed screening for colorectal cancer, prostate cancer, breast cancer, and cervical cancer. Checklist reviewing preventive services available has been given to the patient.    Reviewed patients plan of care and provided an AVS. The Basic Care Plan (routine screening as documented in Health Maintenance) for Marilee meets the Care Plan requirement. This Care Plan has been established and reviewed with the Patient.    Chang Vora MD  [facility]    Identified Health Risks:    I have reviewed Opioid Use Disorder and Substance Use Disorder risk factors and made any needed referrals.

## 2023-05-26 LAB — RHEUMATOID FACT SER NEPH-ACNC: <7 IU/ML

## 2023-05-28 DIAGNOSIS — I48.0 PAROXYSMAL ATRIAL FIBRILLATION (H): ICD-10-CM

## 2023-05-28 LAB — CCP AB SER IA-ACNC: 1.2 U/ML

## 2023-05-30 ENCOUNTER — HOSPITAL ENCOUNTER (OUTPATIENT)
Dept: MAMMOGRAPHY | Facility: CLINIC | Age: 63
Discharge: HOME | End: 2023-05-30
Attending: STUDENT IN AN ORGANIZED HEALTH CARE EDUCATION/TRAINING PROGRAM
Payer: MEDICARE

## 2023-05-30 ENCOUNTER — ANCILLARY PROCEDURE (OUTPATIENT)
Dept: BONE DENSITY | Facility: CLINIC | Age: 63
End: 2023-05-30
Attending: OBSTETRICS & GYNECOLOGY
Payer: MEDICARE

## 2023-05-30 DIAGNOSIS — M85.80 OTHER SPECIFIED DISORDERS OF BONE DENSITY AND STRUCTURE, UNSPECIFIED SITE: ICD-10-CM

## 2023-05-30 DIAGNOSIS — N63.0 BREAST LUMP: ICD-10-CM

## 2023-05-30 PROCEDURE — 76642 ULTRASOUND BREAST LIMITED: CPT | Mod: LT

## 2023-05-30 PROCEDURE — 77080 DXA BONE DENSITY AXIAL: CPT | Mod: TC | Performed by: RADIOLOGY

## 2023-05-30 PROCEDURE — 77066 DX MAMMO INCL CAD BI: CPT

## 2023-06-01 ENCOUNTER — MYC MEDICAL ADVICE (OUTPATIENT)
Dept: CARDIOLOGY | Facility: CLINIC | Age: 63
End: 2023-06-01
Payer: MEDICARE

## 2023-06-01 RX ORDER — METOPROLOL SUCCINATE 50 MG/1
TABLET, EXTENDED RELEASE ORAL
Qty: 135 TABLET | Refills: 0 | Status: SHIPPED | OUTPATIENT
Start: 2023-06-01 | End: 2023-07-06

## 2023-06-01 NOTE — TELEPHONE ENCOUNTER
Signed Prescriptions:                        Disp   Refills    metoprolol succinate ER (TOPROL XL) 50 MG *135 ta*0        Sig: TAKE 1 AND 1/2 TABLETS(75 MG) BY MOUTH DAILY  Authorizing Provider: CESARIO CHOPRA  Ordering User: ELVIRA RASHID, RN  Cardiology Care Coordinator  [facility]  [phone] option 1

## 2023-06-02 ENCOUNTER — TRANSFERRED RECORDS (OUTPATIENT)
Dept: HEALTH INFORMATION MANAGEMENT | Facility: CLINIC | Age: 63
End: 2023-06-02
Payer: MEDICARE

## 2023-06-02 DIAGNOSIS — M54.50 LUMBAR SPINE PAIN: ICD-10-CM

## 2023-06-02 RX ORDER — CELECOXIB 200 MG/1
CAPSULE ORAL
Qty: 30 CAPSULE | Refills: 2 | Status: SHIPPED | OUTPATIENT
Start: 2023-06-02 | End: 2023-09-12

## 2023-06-02 NOTE — TELEPHONE ENCOUNTER
PSP: Dr. Malagon    Pharmacy sent refill request for celebrex   --Med last Rx 12/7/22 #30, 3 refills   --Last OV 12/7/22, injection 1/24/23   --Future appt: none  --Please advise

## 2023-06-05 ENCOUNTER — HOSPITAL ENCOUNTER (OUTPATIENT)
Dept: MRI IMAGING | Facility: HOSPITAL | Age: 63
Discharge: HOME | End: 2023-06-05
Attending: INTERNAL MEDICINE | Admitting: INTERNAL MEDICINE
Payer: MEDICARE

## 2023-06-05 DIAGNOSIS — M79.602 BILATERAL ARM PAIN: ICD-10-CM

## 2023-06-05 DIAGNOSIS — M54.2 NECK PAIN: ICD-10-CM

## 2023-06-05 DIAGNOSIS — R29.2 HYPERREFLEXIA: ICD-10-CM

## 2023-06-05 DIAGNOSIS — M79.601 BILATERAL ARM PAIN: ICD-10-CM

## 2023-06-05 PROCEDURE — 72141 MRI NECK SPINE W/O DYE: CPT | Mod: MF

## 2023-06-06 ENCOUNTER — HOSPITAL ENCOUNTER (OUTPATIENT)
Dept: NUCLEAR MEDICINE | Facility: HOSPITAL | Age: 63
Discharge: HOME | End: 2023-06-06
Attending: INTERNAL MEDICINE
Payer: MEDICARE

## 2023-06-06 ENCOUNTER — HOSPITAL ENCOUNTER (OUTPATIENT)
Dept: CARDIOLOGY | Facility: HOSPITAL | Age: 63
Discharge: HOME | End: 2023-06-06
Attending: INTERNAL MEDICINE
Payer: MEDICARE

## 2023-06-06 DIAGNOSIS — R07.9 CHEST PAIN, UNSPECIFIED TYPE: ICD-10-CM

## 2023-06-06 DIAGNOSIS — R68.89 DECREASED EXERCISE TOLERANCE: ICD-10-CM

## 2023-06-06 LAB
CV STRESS CURRENT BP HE: NORMAL
CV STRESS CURRENT HR HE: 101
CV STRESS CURRENT HR HE: 104
CV STRESS CURRENT HR HE: 109
CV STRESS CURRENT HR HE: 110
CV STRESS CURRENT HR HE: 115
CV STRESS CURRENT HR HE: 122
CV STRESS CURRENT HR HE: 124
CV STRESS CURRENT HR HE: 125
CV STRESS CURRENT HR HE: 127
CV STRESS CURRENT HR HE: 59
CV STRESS CURRENT HR HE: 60
CV STRESS CURRENT HR HE: 65
CV STRESS CURRENT HR HE: 66
CV STRESS CURRENT HR HE: 67
CV STRESS CURRENT HR HE: 70
CV STRESS CURRENT HR HE: 70
CV STRESS CURRENT HR HE: 71
CV STRESS CURRENT HR HE: 72
CV STRESS CURRENT HR HE: 73
CV STRESS CURRENT HR HE: 78
CV STRESS CURRENT HR HE: 78
CV STRESS CURRENT HR HE: 79
CV STRESS CURRENT HR HE: 82
CV STRESS CURRENT HR HE: 85
CV STRESS CURRENT HR HE: 85
CV STRESS CURRENT HR HE: 88
CV STRESS CURRENT HR HE: 89
CV STRESS CURRENT HR HE: 89
CV STRESS DEVIATION TIME HE: NORMAL
CV STRESS ECHO PERCENT HR HE: NORMAL
CV STRESS EXERCISE STAGE HE: NORMAL
CV STRESS FINAL RESTING BP HE: NORMAL
CV STRESS FINAL RESTING HR HE: 67
CV STRESS MAX HR HE: 137
CV STRESS MAX TREADMILL GRADE HE: 16
CV STRESS MAX TREADMILL SPEED HE: 3.6
CV STRESS PEAK DIA BP HE: NORMAL
CV STRESS PEAK SYS BP HE: NORMAL
CV STRESS PHASE HE: NORMAL
CV STRESS PROTOCOL HE: NORMAL
CV STRESS RESTING PT POSITION HE: NORMAL
CV STRESS RESTING PT POSITION HE: NORMAL
CV STRESS ST DEVIATION AMOUNT HE: NORMAL
CV STRESS ST DEVIATION ELEVATION HE: NORMAL
CV STRESS ST EVELATION AMOUNT HE: NORMAL
CV STRESS TEST TYPE HE: NORMAL
CV STRESS TOTAL STAGE TIME MIN 1 HE: NORMAL
NUC STRESS EJECTION FRACTION: 68 %
RATE PRESSURE PRODUCT: NORMAL
STRESS ECHO BASELINE DIASTOLIC HE: 91
STRESS ECHO BASELINE HR: 56
STRESS ECHO BASELINE SYSTOLIC BP: 167
STRESS ECHO CALCULATED PERCENT HR: 88 %
STRESS ECHO LAST STRESS DIASTOLIC BP: 84
STRESS ECHO LAST STRESS HR: 124
STRESS ECHO LAST STRESS SYSTOLIC BP: 186
STRESS ECHO POST ESTIMATED WORKLOAD: 11.8
STRESS ECHO POST EXERCISE DUR MIN: 9
STRESS ECHO POST EXERCISE DUR SEC: 45
STRESS ECHO TARGET HR: 155

## 2023-06-06 PROCEDURE — G1010 CDSM STANSON: HCPCS | Performed by: INTERNAL MEDICINE

## 2023-06-06 PROCEDURE — A9500 TC99M SESTAMIBI: HCPCS | Performed by: INTERNAL MEDICINE

## 2023-06-06 PROCEDURE — 93016 CV STRESS TEST SUPVJ ONLY: CPT | Performed by: INTERNAL MEDICINE

## 2023-06-06 PROCEDURE — 343N000001 HC RX 343: Performed by: INTERNAL MEDICINE

## 2023-06-06 PROCEDURE — 78452 HT MUSCLE IMAGE SPECT MULT: CPT | Mod: 26 | Performed by: INTERNAL MEDICINE

## 2023-06-06 PROCEDURE — 93018 CV STRESS TEST I&R ONLY: CPT | Performed by: INTERNAL MEDICINE

## 2023-06-06 PROCEDURE — 93017 CV STRESS TEST TRACING ONLY: CPT

## 2023-06-06 PROCEDURE — G1010 CDSM STANSON: HCPCS

## 2023-06-06 RX ADMIN — Medication 8.3 MILLICURIE: at 08:26

## 2023-06-06 RX ADMIN — Medication 33 MILLICURIE: at 10:20

## 2023-07-06 ENCOUNTER — OFFICE VISIT (OUTPATIENT)
Dept: INTERNAL MEDICINE | Facility: CLINIC | Age: 63
End: 2023-07-06
Payer: MEDICARE

## 2023-07-06 VITALS
WEIGHT: 155.7 LBS | HEIGHT: 68 IN | DIASTOLIC BLOOD PRESSURE: 80 MMHG | OXYGEN SATURATION: 98 % | RESPIRATION RATE: 15 BRPM | TEMPERATURE: 97.5 F | BODY MASS INDEX: 23.6 KG/M2 | HEART RATE: 56 BPM | SYSTOLIC BLOOD PRESSURE: 132 MMHG

## 2023-07-06 DIAGNOSIS — I83.811 VARICOSE VEINS OF RIGHT LOWER EXTREMITY WITH PAIN: Primary | ICD-10-CM

## 2023-07-06 DIAGNOSIS — I48.0 PAROXYSMAL ATRIAL FIBRILLATION (H): ICD-10-CM

## 2023-07-06 DIAGNOSIS — M43.16 SPONDYLOLISTHESIS OF LUMBAR REGION: ICD-10-CM

## 2023-07-06 DIAGNOSIS — E89.0 POSTSURGICAL HYPOTHYROIDISM: ICD-10-CM

## 2023-07-06 DIAGNOSIS — M85.80 OSTEOPENIA, UNSPECIFIED LOCATION: ICD-10-CM

## 2023-07-06 PROCEDURE — 99214 OFFICE O/P EST MOD 30 MIN: CPT | Performed by: INTERNAL MEDICINE

## 2023-07-06 RX ORDER — LEVOTHYROXINE SODIUM 100 UG/1
100 TABLET ORAL DAILY
Qty: 90 TABLET | Refills: 4 | Status: SHIPPED | OUTPATIENT
Start: 2023-07-06 | End: 2024-08-28

## 2023-07-06 RX ORDER — DULOXETIN HYDROCHLORIDE 30 MG/1
30 CAPSULE, DELAYED RELEASE ORAL DAILY
Qty: 90 CAPSULE | Refills: 4 | Status: SHIPPED | OUTPATIENT
Start: 2023-07-06 | End: 2024-08-09

## 2023-07-06 RX ORDER — METOPROLOL SUCCINATE 50 MG/1
75 TABLET, EXTENDED RELEASE ORAL DAILY
Qty: 135 TABLET | Refills: 4 | Status: SHIPPED | OUTPATIENT
Start: 2023-07-06 | End: 2024-07-26

## 2023-07-06 NOTE — PROGRESS NOTES
Office Visit - Follow Up   Marilee Weston   63 year old female    Date of Visit: 7/6/2023    Chief Complaint   Patient presents with     RECHECK     Bone density follow-up        Assessment and Plan   1. Varicose veins of right lower extremity with pain  In addition to below she does have some symptomatic varicose veins with prior interventions.  I recommend she meet with Dr. Keshawn Pablo  - Adult General Surg Referral; Future    2. Spondylolisthesis of lumbar region  - DULoxetine (CYMBALTA) 30 MG capsule; Take 1 capsule (30 mg) by mouth daily  Dispense: 90 capsule; Refill: 4    3. Postsurgical hypothyroidism  - levothyroxine (SYNTHROID) 100 MCG tablet; Take 1 tablet (100 mcg) by mouth daily  Dispense: 90 tablet; Refill: 4    4. Paroxysmal atrial fibrillation (H)  - metoprolol succinate ER (TOPROL XL) 50 MG 24 hr tablet; Take 1.5 tablets (75 mg) by mouth daily  Dispense: 135 tablet; Refill: 4    5. Osteopenia, unspecified location  Discussed at length, at this point I do not recommend pharmacologic treatment.  We can recheck her bone density in 2 years.  Continue with adequate calcium and vitamin D.  If she were to experience a fragility fracture or if her bone density worsens or fracture risk increases I would recommend treatment with a bisphosphonate such as Reclast.  She has been on Boniva in the past but this was a couple of decades ago and I think we could be treated with a 5-year course.      Return in about 6 months (around 1/6/2024) for Follow up.     History of Present Illness   This 63 year old woman comes in for follow-up.  She like to discuss her bone density which shows osteopenia but not osteoporosis.  From previous testing she has had about a 1 to 2% decline in bone density.  She takes calcium and vitamin D and gets adequate weightbearing exercise.  She has never had a fragility fracture and her risk of osteoporotic fracture is 7% and her risk of hip fracture is 0.7%.       Physical Exam   General  "Appearance:   No acute distress    /80 (BP Location: Left arm, Patient Position: Sitting, Cuff Size: Adult Regular)   Pulse 56   Temp 97.5  F (36.4  C) (Tympanic)   Resp 15   Ht 1.727 m (5' 8\")   Wt 70.6 kg (155 lb 11.2 oz)   LMP 07/06/2008 (Approximate)   SpO2 98%   Breastfeeding No   BMI 23.67 kg/m           Additional Information   Current Outpatient Medications   Medication Sig Dispense Refill     calcium carbonate 600 mg-vitamin D 400 units (CALTRATE) 600-400 MG-UNIT per tablet Take 1 tablet by mouth 2 times daily       celecoxib (CELEBREX) 200 MG capsule TAKE 1 CAPSULE(200 MG) BY MOUTH DAILY Strength: 200 mg 30 capsule 2     DULoxetine (CYMBALTA) 30 MG capsule Take 1 capsule (30 mg) by mouth daily 90 capsule 4     estradiol (VAGIFEM) 10 MCG TABS vaginal tablet Place 10 mcg vaginally daily       fish oil-omega-3 fatty acids 1000 MG capsule Take 1 g by mouth 2 times daily.       Flaxseed, Linseed, (FLAXSEED OIL PO)        levothyroxine (SYNTHROID) 100 MCG tablet Take 1 tablet (100 mcg) by mouth daily 90 tablet 4     metoprolol succinate ER (TOPROL XL) 50 MG 24 hr tablet Take 1.5 tablets (75 mg) by mouth daily 135 tablet 4     metoprolol tartrate (LOPRESSOR) 25 MG tablet Take 1 tablet (25 mg) by mouth as needed (Take 25mg for AF episodes, if persists after 1-2 hours, take another 25mg.) 60 tablet 0       Time:      Chang Vora MD  Answers for HPI/ROS submitted by the patient on 7/6/2023  How many servings of fruits and vegetables do you eat daily?: 2-3  On average, how many sweetened beverages do you drink each day (Examples: soda, juice, sweet tea, etc.  Do NOT count diet or artificially sweetened beverages)?: 0  How many minutes a day do you exercise enough to make your heart beat faster?: 30 to 60  How many days a week do you exercise enough to make your heart beat faster?: 4  How many days per week do you miss taking your medication?: 0  What is the reason for your visit today?: Bone " Density  When did your symptoms begin?: More than a month  What are your symptoms?: bone density testing and family history of osteoporosis  How would you describe these symptoms?: Mild  Are your symptoms:: Staying the same  Have you had these symptoms before?: Yes  Have you tried or received treatment for these symptoms before?: Yes  Did that treatment work? : Yes  Please describe the treatment you had:: Boniva

## 2023-07-20 ENCOUNTER — OFFICE VISIT (OUTPATIENT)
Dept: PHYSICAL MEDICINE AND REHAB | Facility: CLINIC | Age: 63
End: 2023-07-20
Payer: MEDICARE

## 2023-07-20 VITALS — HEART RATE: 58 BPM | SYSTOLIC BLOOD PRESSURE: 137 MMHG | DIASTOLIC BLOOD PRESSURE: 71 MMHG

## 2023-07-20 DIAGNOSIS — M43.16 SPONDYLOLISTHESIS OF LUMBAR REGION: ICD-10-CM

## 2023-07-20 DIAGNOSIS — M47.816 ARTHROPATHY OF LUMBAR FACET JOINT: ICD-10-CM

## 2023-07-20 DIAGNOSIS — M53.3 SACRAL PAIN: ICD-10-CM

## 2023-07-20 DIAGNOSIS — M54.2 CERVICAL SPINE PAIN: Primary | ICD-10-CM

## 2023-07-20 DIAGNOSIS — M48.02 CERVICAL STENOSIS OF SPINAL CANAL: ICD-10-CM

## 2023-07-20 PROCEDURE — 99214 OFFICE O/P EST MOD 30 MIN: CPT | Performed by: PHYSICAL MEDICINE & REHABILITATION

## 2023-07-20 ASSESSMENT — PAIN SCALES - GENERAL: PAINLEVEL: MODERATE PAIN (4)

## 2023-07-20 NOTE — PATIENT INSTRUCTIONS
A physical therapy order was provided for you today.  You will be contacted by physical therapy.  If nobody contacts you within 3 to 5 days, please contact the clinic at [phone].  It will be very important for you to do your physical therapy exercises on a regular basis to decrease your pain and prevent future pain flares.   An Xray was ordered for you today.  Please call Radiology at [phone].

## 2023-07-20 NOTE — PROGRESS NOTES
Assessment/Plan:      Marilee was seen today for back pain and neck pain.    Diagnoses and all orders for this visit:    Cervical spine pain  -     Physical Therapy Referral; Future    Cervical stenosis of spinal canal  -     Physical Therapy Referral; Future    Spondylolisthesis of lumbar region  -     XR Lumbar Spine 2/3 Views; Future  -     Physical Therapy Referral; Future    Arthropathy of lumbar facet joint  -     XR Lumbar Spine 2/3 Views; Future  -     Physical Therapy Referral; Future    Sacral pain  -     Physical Therapy Referral; Future         Assessment: Pleasant 63 year old female with past medical history significant for depression and thyroid disorder, A. fib with:     1.  New cervical spine pain with intermittent bilateral upper extremity paresthesias and sense of weakness.  She has moderate central stenosis C5-6 measuring 7 mm for me on MRI and mild to moderate C4-5 with multilevel degenerative disc disease.  May have some intermittent myelopathic symptoms although normal neurologically for me on exam.      2.  Persistent sacral pain over the midline of the sacrum along with left greater than right sacral pain over the lower SI joints and GREGORIO region of the sacrum.  Was previously having gluteal and PSIS pain as well which  improved with Celebrex and physical therapy.  Her sacroiliac pain has improved and now her pain is mostly over the sacrum.  Likely related to mechanical pain from facet arthropathy at L4-5 and L5-S1 with an L4-5 spondylolisthesis.     Pain is fairly tolerable with Cymbalta, Celebrex and home physical therapy program but still having significant pain over the sacrum.  Negative SI provocative maneuvers today. Previously had 100% relief of the pain following bilateral sacroiliac joint injections in September 2021.  No significant benefit following bilateral sacroiliac joint injections January 24, 2023.    No radicular pain.     3.  L4-5 degenerative disc disease with  spondylolisthesis mild central stenosis and severe right foraminal stenosis.    4.  Myofascial pain gluteal region greater trochanters.   Remains improved with medication.       Discussion:    1. I discussed the diagnosis and treatment options.  We discussed the options for the cervical spine and discussed central cord syndrome at length today and cervical spinal stenosis.  We discussed that she should be careful with high impact activities and really need to strengthen her cervical spine.  If she develops any further myelopathic symptoms she should contact us immediately.    2.  Start physical therapy for cervical and parascapular strengthening with lumbar strengthening stabilization.    3.  lumbar flexion-extension x-rays to evaluate L4-5 spondylolisthesis.    4.  Continue Cymbalta 30 mg daily .    5.  Follow-up with me as needed after physical therapy and I will contact her with results of imaging and further recommendations.        It was our pleasure caring for your patient today, if there any questions or concerns please do not hesitate to contact us.      Over 30 minutes were spent on the date of the encounter performing chart review, patient visit and documentation in addition to any procedure.    Subjective:   Patient ID: Marilee Weston is a 63 year old female.    History of Present Illness:Patient presents today for evaluation of cervical spine pain and episodes of bilateral arm paresthesias and sense of weakness.  Also having ongoing low back pain over the sacrum.  Neck pain started a few months ago pain throughout the cervical spine and cervical thoracic junction and with riding her horse she had episodes of numbness and tingling diffusely through the arm and shoulders and a sense of weakness.  Was seen by PCP cardiac work-up was negative.  MRI of the cervical spine was ordered and she was referred here again.  Her neck pain still through the mid cervical spine no paresthesias today or weakness.  Pain is  an 8/10 at worst 4/10 today and at best.  Has had pain in the past but this neck pain is worsened.    She also has persistent sacral pain over the sacrum itself and lumbosacral junction no radiation down the legs paresthesias or weakness.  Is continues to take Celebrex and Tylenol.  Most recent sacroiliac joint injections in January were not as helpful as the one prior.      Imaging:  MRI cervical spine images and report personally reviewed for medical decision-making purposes.  Moderate disc height loss C3-4 C4-5 C6-7 advanced disc height loss C5-6 and C7-T1.  C7-T1 severe right mild left foraminal stenosis mild bilateral foraminal stenosis C6/7, moderate right mild left at C5-6 severe right mild left C4-5 no foraminal stenosis C3-4 only minimal facet arthropathy.  Mild degenerative changes C1-2.   Mild central stenosis 4-5 C5-6 no high-grade spinal cord compression and no spinal cord signal abnormality.    Lumbar MRI images as well from December 2020 were personally reviewed with a grade 1 spondylolisthesis degenerative L4 and L5 with facet arthropathy which results in severe right foraminal stenosis no significant central stenosis.  Loss of disc height at L5-S1 is mild with moderate left foraminal stenosis.    Review of Systems: Pertinent positives: Headaches .  Pertinent negatives: No numbness, tingling or weakness.  No bowel or bladder incontinence.  No urinary retention.  No fevers, unintentional weight loss, balance changes,  frequent falling, difficulty swallowing, or coordination difficulties.  All others reviewed are negative.  Past Medical History:   Diagnosis Date     Atrial fibrillation (H) 2010     Depression      Fibrocystic breast      Osteopenia      Osteopenia      Paroxysmal atrial fibrillation (H)      Postsurgical hypothyroidism        The following portions of the patient's history were reviewed and updated as appropriate: allergies, current medications, past family history, past medical  history, past social history, past surgical history and problem list.           Objective:   Physical Exam:    /71   Pulse 58   LMP 07/06/2008 (Approximate)   There is no height or weight on file to calculate BMI.      General: Alert and oriented with normal affect. Attention, knowledge, memory, and language are intact. No acute distress.     Respirations: Unlabored. CV: No lower extremity edema.       Gait:  Nonantalgic    Sensation is intact to light touch throughout the upper and lower extremities.  Reflexes are 2+ and symmetric in the biceps triceps and brachioradialis with negative Hoffmans. 2+ patellar and Achilles     Manual muscle testing reveals:  Right /Left out of 5  5/5 shoulder abductors  5/5 elbow flexors  5/5 elbow extensors  5/5 wrist extensors  5/5 interosseus  5/5 finger flexors  5/5 hip flexors  5/5 knee flexors  5/5 knee extensors  5/5 ankle plantar flexors  5/5 ankle dorsiflexors  5/5  EHL

## 2023-07-20 NOTE — LETTER
7/20/2023         RE: Mar[facility] West[facility]  [address] [facility] [address]        Dear Colleague,    Thank you for referring your patient, Marilee Weston, to the [facility]. Please see a copy of my visit note below.    Assessment/Plan:      Marilee was seen today for back pain and neck pain.    Diagnoses and all orders for this visit:    Cervical spine pain  -     Physical Therapy Referral; Future    Cervical stenosis of spinal canal  -     Physical Therapy Referral; Future    Spondylolisthesis of lumbar region  -     XR Lumbar Spine 2/3 Views; Future  -     Physical Therapy Referral; Future    Arthropathy of lumbar facet joint  -     XR Lumbar Spine 2/3 Views; Future  -     Physical Therapy Referral; Future    Sacral pain  -     Physical Therapy Referral; Future         Assessment: Pleasant 63 year old female with past medical history significant for depression and thyroid disorder, A. fib with:     1.  New cervical spine pain with intermittent bilateral upper extremity paresthesias and sense of weakness.  She has moderate central stenosis C5-6 measuring 7 mm for me on MRI and mild to moderate C4-5 with multilevel degenerative disc disease.  May have some intermittent myelopathic symptoms although normal neurologically for me on exam.      2.  Persistent sacral pain over the midline of the sacrum along with left greater than right sacral pain over the lower SI joints and GREGORIO region of the sacrum.  Was previously having gluteal and PSIS pain as well which  improved with Celebrex and physical therapy.  Her sacroiliac pain has improved and now her pain is mostly over the sacrum.  Likely related to mechanical pain from facet arthropathy at L4-5 and L5-S1 with an L4-5 spondylolisthesis.     Pain is fairly tolerable with Cymbalta, Celebrex and home physical therapy program but still having significant pain over the sacrum.  Negative SI provocative maneuvers today. Previously had 100% relief  of the pain following bilateral sacroiliac joint injections in September 2021.  No significant benefit following bilateral sacroiliac joint injections January 24, 2023.    No radicular pain.     3.  L4-5 degenerative disc disease with spondylolisthesis mild central stenosis and severe right foraminal stenosis.    4.  Myofascial pain gluteal region greater trochanters.   Remains improved with medication.       Discussion:    1. I discussed the diagnosis and treatment options.  We discussed the options for the cervical spine and discussed central cord syndrome at length today and cervical spinal stenosis.  We discussed that she should be careful with high impact activities and really need to strengthen her cervical spine.  If she develops any further myelopathic symptoms she should contact us immediately.    2.  Start physical therapy for cervical and parascapular strengthening with lumbar strengthening stabilization.    3.  lumbar flexion-extension x-rays to evaluate L4-5 spondylolisthesis.    4.  Continue Cymbalta 30 mg daily .    5.  Follow-up with me as needed after physical therapy and I will contact her with results of imaging and further recommendations.        It was our pleasure caring for your patient today, if there any questions or concerns please do not hesitate to contact us.      Over 30 minutes were spent on the date of the encounter performing chart review, patient visit and documentation in addition to any procedure.    Subjective:   Patient ID: Marilee Weston is a 63 year old female.    History of Present Illness:Patient presents today for evaluation of cervical spine pain and episodes of bilateral arm paresthesias and sense of weakness.  Also having ongoing low back pain over the sacrum.  Neck pain started a few months ago pain throughout the cervical spine and cervical thoracic junction and with riding her horse she had episodes of numbness and tingling diffusely through the arm and shoulders and  a sense of weakness.  Was seen by PCP cardiac work-up was negative.  MRI of the cervical spine was ordered and she was referred here again.  Her neck pain still through the mid cervical spine no paresthesias today or weakness.  Pain is an 8/10 at worst 4/10 today and at best.  Has had pain in the past but this neck pain is worsened.    She also has persistent sacral pain over the sacrum itself and lumbosacral junction no radiation down the legs paresthesias or weakness.  Is continues to take Celebrex and Tylenol.  Most recent sacroiliac joint injections in January were not as helpful as the one prior.      Imaging:  MRI cervical spine images and report personally reviewed for medical decision-making purposes.  Moderate disc height loss C3-4 C4-5 C6-7 advanced disc height loss C5-6 and C7-T1.  C7-T1 severe right mild left foraminal stenosis mild bilateral foraminal stenosis C6/7, moderate right mild left at C5-6 severe right mild left C4-5 no foraminal stenosis C3-4 only minimal facet arthropathy.  Mild degenerative changes C1-2.   Mild central stenosis 4-5 C5-6 no high-grade spinal cord compression and no spinal cord signal abnormality.    Lumbar MRI images as well from December 2020 were personally reviewed with a grade 1 spondylolisthesis degenerative L4 and L5 with facet arthropathy which results in severe right foraminal stenosis no significant central stenosis.  Loss of disc height at L5-S1 is mild with moderate left foraminal stenosis.    Review of Systems: Pertinent positives: Headaches .  Pertinent negatives: No numbness, tingling or weakness.  No bowel or bladder incontinence.  No urinary retention.  No fevers, unintentional weight loss, balance changes,  frequent falling, difficulty swallowing, or coordination difficulties.  All others reviewed are negative.  Past Medical History:   Diagnosis Date     Atrial fibrillation (H) 2010     Depression      Fibrocystic breast      Osteopenia      Osteopenia       Paroxysmal atrial fibrillation (H)      Postsurgical hypothyroidism        The following portions of the patient's history were reviewed and updated as appropriate: allergies, current medications, past family history, past medical history, past social history, past surgical history and problem list.           Objective:   Physical Exam:    /71   Pulse 58   LMP 07/06/2008 (Approximate)   There is no height or weight on file to calculate BMI.      General: Alert and oriented with normal affect. Attention, knowledge, memory, and language are intact. No acute distress.     Respirations: Unlabored. CV: No lower extremity edema.       Gait:  Nonantalgic    Sensation is intact to light touch throughout the upper and lower extremities.  Reflexes are 2+ and symmetric in the biceps triceps and brachioradialis with negative Hoffmans. 2+ patellar and Achilles     Manual muscle testing reveals:  Right /Left out of 5  5/5 shoulder abductors  5/5 elbow flexors  5/5 elbow extensors  5/5 wrist extensors  5/5 interosseus  5/5 finger flexors  5/5 hip flexors  5/5 knee flexors  5/5 knee extensors  5/5 ankle plantar flexors  5/5 ankle dorsiflexors  5/5  EHL        Again, thank you for allowing me to participate in the care of your patient.        Sincerely,        David Malagon DO

## 2023-07-25 ENCOUNTER — HOSPITAL ENCOUNTER (OUTPATIENT)
Dept: GENERAL RADIOLOGY | Facility: HOSPITAL | Age: 63
Discharge: HOME | End: 2023-07-25
Attending: PHYSICAL MEDICINE & REHABILITATION | Admitting: PHYSICAL MEDICINE & REHABILITATION
Payer: MEDICARE

## 2023-07-25 DIAGNOSIS — M43.16 SPONDYLOLISTHESIS OF LUMBAR REGION: ICD-10-CM

## 2023-07-25 DIAGNOSIS — M47.816 ARTHROPATHY OF LUMBAR FACET JOINT: ICD-10-CM

## 2023-07-25 PROCEDURE — 72100 X-RAY EXAM L-S SPINE 2/3 VWS: CPT

## 2023-07-31 ENCOUNTER — OFFICE VISIT (OUTPATIENT)
Dept: CARDIOLOGY | Facility: CLINIC | Age: 63
End: 2023-07-31
Payer: MEDICARE

## 2023-07-31 VITALS
DIASTOLIC BLOOD PRESSURE: 85 MMHG | HEART RATE: 54 BPM | OXYGEN SATURATION: 100 % | WEIGHT: 158 LBS | BODY MASS INDEX: 24.02 KG/M2 | SYSTOLIC BLOOD PRESSURE: 151 MMHG

## 2023-07-31 DIAGNOSIS — I48.0 PAROXYSMAL ATRIAL FIBRILLATION (H): Primary | ICD-10-CM

## 2023-07-31 PROCEDURE — 99213 OFFICE O/P EST LOW 20 MIN: CPT | Performed by: INTERNAL MEDICINE

## 2023-07-31 NOTE — LETTER
7/31/2023      RE: Marilee Weston  [address] [facility] [address]       Dear Colleague,    Thank you for the opportunity to participate in the care of your patient, Marilee Weston, at the [facility]. Please see a copy of my visit note below.    HPI: Purpose of visit: Follow-up of paroxysmal atrial fibrillation     Patient is a 63-year-old woman whom I have been following for paroxysmal atrial fibrillation. Other than age of 63 years, she does not have risk factors for stroke such as hypertension, diabetes, coronary artery disease, heart failure, or prior stroke or TIA.    Patient's last visit with me was in December 2021, in the last 1 and half years, patient has not experienced any recurrent atrial fibrillation episodes.  She is currently taking Toprol XL 75 mg once daily    She did not report any symptoms of palpitations, irregular heartbeat sensation, exertional dyspnea, exertional angina, frequent lightheadedness, presyncope or syncope.    PAST MEDICAL HISTORY:  Past Medical History:   Diagnosis Date    Atrial fibrillation (H) 2010    Depression     Fibrocystic breast     Osteopenia     Osteopenia     Paroxysmal atrial fibrillation (H)     Postsurgical hypothyroidism        CURRENT MEDICATIONS:  Current Outpatient Medications   Medication Sig Dispense Refill    calcium carbonate 600 mg-vitamin D 400 units (CALTRATE) 600-400 MG-UNIT per tablet Take 1 tablet by mouth 2 times daily      celecoxib (CELEBREX) 200 MG capsule TAKE 1 CAPSULE(200 MG) BY MOUTH DAILY Strength: 200 mg 30 capsule 2    DULoxetine (CYMBALTA) 30 MG capsule Take 1 capsule (30 mg) by mouth daily 90 capsule 4    estradiol (VAGIFEM) 10 MCG TABS vaginal tablet Place 10 mcg vaginally daily      fish oil-omega-3 fatty acids 1000 MG capsule Take 1 g by mouth 2 times daily.      Flaxseed, Linseed, (FLAXSEED OIL PO)       levothyroxine (SYNTHROID) 100 MCG tablet  Take 1 tablet (100 mcg) by mouth daily 90 tablet 4    metoprolol succinate ER (TOPROL XL) 50 MG 24 hr tablet Take 1.5 tablets (75 mg) by mouth daily 135 tablet 4    metoprolol tartrate (LOPRESSOR) 25 MG tablet Take 1 tablet (25 mg) by mouth as needed (Take 25mg for AF episodes, if persists after 1-2 hours, take another 25mg.) 60 tablet 0       PAST SURGICAL HISTORY:  Past Surgical History:   Procedure Laterality Date    BIOPSY BREAST Right     clogged duct    PILONIDAL CYST / SINUS EXCISION      STRIP VEIN  ,     THYROIDECTOMY  1985    benign tumor    THYROIDECTOMY   1986    Partial for benign thyroid tumor    VEIN LIGATION AND STRIPPING Right 1975    x2    VEIN SURGERY         ALLERGIES:     Allergies   Allergen Reactions    Pcn [Penicillins] Rash       FAMILY HISTORY:  - Premature coronary artery disease  - Atrial fibrillation  - Sudden cardiac death     SOCIAL HISTORY:  Social History     Tobacco Use    Smoking status: Former     Types: Cigarettes     Quit date: 1984     Years since quittin.6    Smokeless tobacco: Never   Vaping Use    Vaping Use: Never used   Substance Use Topics    Alcohol use: Yes     Alcohol/week: 7.0 standard drinks of alcohol     Comment: Occ.    Drug use: No       ROS:   Constitutional: No fever, chills, or sweats. Weight stable.   ENT: No visual disturbance, ear ache, epistaxis, sore throat.   Cardiovascular: As per HPI.   Respiratory: No cough, hemoptysis.    GI: No nausea, vomiting, hematemesis, melena, or hematochezia.   : No hematuria.   Integument: Negative.   Psychiatric: Negative.   Hematologic:  Easy bruising, no easy bleeding.  Neuro: Negative.   Endocrinology: No significant heat or cold intolerance   Musculoskeletal: No myalgia.    Exam:  BP (!) 151/85 (BP Location: Left arm, Patient Position: Chair, Cuff Size: Adult Regular)   Pulse 54   Wt 71.7 kg (158 lb)   LMP 2008 (Approximate)   SpO2 100%   BMI 24.02 kg/m    GENERAL APPEARANCE:  healthy, alert and no distress  HEENT: no icterus, no xanthelasmas, normal pupil size and reaction, normal palate, mucosa moist, no central cyanosis  NECK: no adenopathy, no asymmetry, masses, or scars, thyroid normal to palpation and no bruits, JVP not elevated  RESPIRATORY: lungs clear to auscultation - no rales, rhonchi or wheezes, no use of accessory muscles, no retractions, respirations are unlabored, normal respiratory rate  CARDIOVASCULAR: regular rhythm, normal S1 with physiologic split S2, no S3 or S4 and no murmur, click or rub, precordium quiet with normal PMI.  ABDOMEN: soft, non tender, without hepatosplenomegaly, no masses palpable, bowel sounds normal, aorta not enlarged by palpation, no abdominal bruits  EXTREMITIES: peripheral pulses normal, no edema, no bruits  NEURO: alert and oriented to person/place/time, normal speech, gait and affect  VASC: Radial, femoral, dorsalis pedis and posterior tibialis pulses are normal in volumes and symmetric bilaterally. No bruits are heard.  SKIN: no ecchymoses, no rashes    Labs:  CBC RESULTS:   Lab Results   Component Value Date    WBC 4.2 05/25/2023    WBC 5.2 01/15/2018    RBC 4.32 05/25/2023    RBC 4.30 01/15/2018    HGB 13.5 05/25/2023    HGB 13.4 01/15/2018    HCT 41.9 05/25/2023    HCT 41.5 01/15/2018    MCV 97 05/25/2023    MCV 97 01/15/2018    MCH 31.3 05/25/2023    MCH 31.2 01/15/2018    MCHC 32.2 05/25/2023    MCHC 32.3 01/15/2018    RDW 12.5 05/25/2023    RDW 12.4 01/15/2018     05/25/2023     01/15/2018       BMP RESULTS:  Lab Results   Component Value Date     05/25/2023     01/15/2018    POTASSIUM 4.7 05/25/2023    POTASSIUM 4.3 06/14/2021    POTASSIUM 3.8 01/15/2018    CHLORIDE 106 05/25/2023    CHLORIDE 106 06/14/2021    CHLORIDE 105 01/15/2018    CO2 26 05/25/2023    CO2 25 06/14/2021    CO2 31 01/15/2018    ANIONGAP 11 05/25/2023    ANIONGAP 10 06/14/2021    ANIONGAP 5 01/15/2018    GLC 91 05/25/2023      06/14/2021    GLC 96 04/02/2019    BUN 18.8 05/25/2023    BUN 18 06/14/2021    BUN 16 01/15/2018    CR 0.65 05/25/2023    CR 0.85 01/15/2018    GFRESTIMATED >90 05/25/2023    GFRESTIMATED >60 06/14/2021    GFRESTIMATED 68 01/15/2018    GFRESTBLACK >60 06/14/2021    GFRESTBLACK 82 01/15/2018    AGUSTINA 8.9 05/25/2023    AGUSTINA 8.7 01/15/2018        INR RESULTS:  No results found for: INR    Procedures:      Assessment and Plan:     Paroxysmal atrial fibrillation     It is encouraging that patient is doing very well and her atrial fibrillation is well controlled by metoprolol XL 75 mg once daily. We will continue current medications and see patient again in approximately 1 year.  All questions and concerns were addressed and patient is happy with the plan.      CC  Patient Care Team:  Chang Vora MD as PCP - General  Mayra Burns MD as MD (Cardiology)  Kelley Petit RN as Specialty Care Coordinator (Cardiology)  Chang Vora MD as Assigned PCP  David Malagon DO as Assigned Neuroscience Provider  Libby Castorena MD as MD (OB/Gyn)        Please do not hesitate to contact me if you have any questions/concerns.     Sincerely,     Mayra Burns MD

## 2023-07-31 NOTE — NURSING NOTE
"Chief Complaint   Patient presents with    RECHECK     Return EP cardiology for annual  Cardiac testing since last visit: - CTA chest, NM MPI Treadmill          Initial BP (!) 151/85 (BP Location: Left arm, Patient Position: Chair, Cuff Size: Adult Regular)   Pulse 54   Wt 71.7 kg (158 lb)   LMP 07/06/2008 (Approximate)   SpO2 100%   BMI 24.02 kg/m   Estimated body mass index is 24.02 kg/m  as calculated from the following:    Height as of 7/6/23: 1.727 m (5' 8\").    Weight as of this encounter: 71.7 kg (158 lb)..  BP completed using cuff size: regular    YOLANDA Chen    "

## 2023-07-31 NOTE — NURSING NOTE
Med Reconcile: Reviewed and verified all current medications with the patient. The updated medication list was printed and given to the patient.    Return Appointment: Patient given instructions regarding scheduling next clinic visit. Patient demonstrated understanding of this information and agreed to call with further questions or concerns. Patient to follow up in 1 year in person with Dr. Burns.    Patient left before blood pressure could be rechecked and before AVS could be given to patient.    Carlita Richardson RN, BSN  Cardiology RN Care Coordinator   [place]   Phone: [phone]  Fax: [phone] ([place]) [phone] ([place])

## 2023-07-31 NOTE — PATIENT INSTRUCTIONS
Thank you for coming to the [facility] [place]; please note the following instructions:    1. Follow up in 1 year in person with Dr. Burns        If you have any questions regarding your visit please contact your care team:     Cardiology  Telephone Number   Renee H., RN  Arlene MCCALL, RN   Deandra SALAS, RMA  Whitley HANSEN, RMLACHO ARITA, Visit Facilitator  Angelika URBINA, [facility] [phone] (option 1)   For scheduling appts:     [phone] (select option 1)       For the Device Clinic (Pacemakers and ICD's)  RN's :  Liane Diop   During business hours: [phone]    *After business hours:  [phone] (select option 4)      Normal test result notifications will be released via [employer] or mailed within 7 business days.  All other test results, will be communicated via telephone once reviewed by your cardiologist.    If you need a medication refill please contact your pharmacy.  Please allow 3 business days for your refill to be completed.    As always, thank you for trusting us with your health care needs!

## 2023-08-01 NOTE — PROGRESS NOTES
HPI: Purpose of visit: Follow-up of paroxysmal atrial fibrillation     Patient is a 63-year-old woman whom I have been following for paroxysmal atrial fibrillation. Other than age of 63 years, she does not have risk factors for stroke such as hypertension, diabetes, coronary artery disease, heart failure, or prior stroke or TIA.    Patient's last visit with me was in December 2021, in the last 1 and half years, patient has not experienced any recurrent atrial fibrillation episodes.  She is currently taking Toprol XL 75 mg once daily    She did not report any symptoms of palpitations, irregular heartbeat sensation, exertional dyspnea, exertional angina, frequent lightheadedness, presyncope or syncope.    PAST MEDICAL HISTORY:  Past Medical History:   Diagnosis Date     Atrial fibrillation (H) 2010     Depression      Fibrocystic breast      Osteopenia      Osteopenia      Paroxysmal atrial fibrillation (H)      Postsurgical hypothyroidism        CURRENT MEDICATIONS:  Current Outpatient Medications   Medication Sig Dispense Refill     calcium carbonate 600 mg-vitamin D 400 units (CALTRATE) 600-400 MG-UNIT per tablet Take 1 tablet by mouth 2 times daily       celecoxib (CELEBREX) 200 MG capsule TAKE 1 CAPSULE(200 MG) BY MOUTH DAILY Strength: 200 mg 30 capsule 2     DULoxetine (CYMBALTA) 30 MG capsule Take 1 capsule (30 mg) by mouth daily 90 capsule 4     estradiol (VAGIFEM) 10 MCG TABS vaginal tablet Place 10 mcg vaginally daily       fish oil-omega-3 fatty acids 1000 MG capsule Take 1 g by mouth 2 times daily.       Flaxseed, Linseed, (FLAXSEED OIL PO)        levothyroxine (SYNTHROID) 100 MCG tablet Take 1 tablet (100 mcg) by mouth daily 90 tablet 4     metoprolol succinate ER (TOPROL XL) 50 MG 24 hr tablet Take 1.5 tablets (75 mg) by mouth daily 135 tablet 4     metoprolol tartrate (LOPRESSOR) 25 MG tablet Take 1 tablet (25 mg) by mouth as needed (Take 25mg for AF episodes, if persists after 1-2 hours, take another  25mg.) 60 tablet 0       PAST SURGICAL HISTORY:  Past Surgical History:   Procedure Laterality Date     BIOPSY BREAST Right     clogged duct     PILONIDAL CYST / SINUS EXCISION  1979     STRIP VEIN  ,      THYROIDECTOMY  1985    benign tumor     THYROIDECTOMY   1986    Partial for benign thyroid tumor     VEIN LIGATION AND STRIPPING Right 1975    x2     VEIN SURGERY         ALLERGIES:     Allergies   Allergen Reactions     Pcn [Penicillins] Rash       FAMILY HISTORY:  - Premature coronary artery disease  - Atrial fibrillation  - Sudden cardiac death     SOCIAL HISTORY:  Social History     Tobacco Use     Smoking status: Former     Types: Cigarettes     Quit date: 1984     Years since quittin.6     Smokeless tobacco: Never   Vaping Use     Vaping Use: Never used   Substance Use Topics     Alcohol use: Yes     Alcohol/week: 7.0 standard drinks of alcohol     Comment: Occ.     Drug use: No       ROS:   Constitutional: No fever, chills, or sweats. Weight stable.   ENT: No visual disturbance, ear ache, epistaxis, sore throat.   Cardiovascular: As per HPI.   Respiratory: No cough, hemoptysis.    GI: No nausea, vomiting, hematemesis, melena, or hematochezia.   : No hematuria.   Integument: Negative.   Psychiatric: Negative.   Hematologic:  Easy bruising, no easy bleeding.  Neuro: Negative.   Endocrinology: No significant heat or cold intolerance   Musculoskeletal: No myalgia.    Exam:  BP (!) 151/85 (BP Location: Left arm, Patient Position: Chair, Cuff Size: Adult Regular)   Pulse 54   Wt 71.7 kg (158 lb)   LMP 2008 (Approximate)   SpO2 100%   BMI 24.02 kg/m    GENERAL APPEARANCE: healthy, alert and no distress  HEENT: no icterus, no xanthelasmas, normal pupil size and reaction, normal palate, mucosa moist, no central cyanosis  NECK: no adenopathy, no asymmetry, masses, or scars, thyroid normal to palpation and no bruits, JVP not elevated  RESPIRATORY: lungs clear to auscultation - no  rales, rhonchi or wheezes, no use of accessory muscles, no retractions, respirations are unlabored, normal respiratory rate  CARDIOVASCULAR: regular rhythm, normal S1 with physiologic split S2, no S3 or S4 and no murmur, click or rub, precordium quiet with normal PMI.  ABDOMEN: soft, non tender, without hepatosplenomegaly, no masses palpable, bowel sounds normal, aorta not enlarged by palpation, no abdominal bruits  EXTREMITIES: peripheral pulses normal, no edema, no bruits  NEURO: alert and oriented to person/place/time, normal speech, gait and affect  VASC: Radial, femoral, dorsalis pedis and posterior tibialis pulses are normal in volumes and symmetric bilaterally. No bruits are heard.  SKIN: no ecchymoses, no rashes    Labs:  CBC RESULTS:   Lab Results   Component Value Date    WBC 4.2 05/25/2023    WBC 5.2 01/15/2018    RBC 4.32 05/25/2023    RBC 4.30 01/15/2018    HGB 13.5 05/25/2023    HGB 13.4 01/15/2018    HCT 41.9 05/25/2023    HCT 41.5 01/15/2018    MCV 97 05/25/2023    MCV 97 01/15/2018    MCH 31.3 05/25/2023    MCH 31.2 01/15/2018    MCHC 32.2 05/25/2023    MCHC 32.3 01/15/2018    RDW 12.5 05/25/2023    RDW 12.4 01/15/2018     05/25/2023     01/15/2018       BMP RESULTS:  Lab Results   Component Value Date     05/25/2023     01/15/2018    POTASSIUM 4.7 05/25/2023    POTASSIUM 4.3 06/14/2021    POTASSIUM 3.8 01/15/2018    CHLORIDE 106 05/25/2023    CHLORIDE 106 06/14/2021    CHLORIDE 105 01/15/2018    CO2 26 05/25/2023    CO2 25 06/14/2021    CO2 31 01/15/2018    ANIONGAP 11 05/25/2023    ANIONGAP 10 06/14/2021    ANIONGAP 5 01/15/2018    GLC 91 05/25/2023     06/14/2021    GLC 96 04/02/2019    BUN 18.8 05/25/2023    BUN 18 06/14/2021    BUN 16 01/15/2018    CR 0.65 05/25/2023    CR 0.85 01/15/2018    GFRESTIMATED >90 05/25/2023    GFRESTIMATED >60 06/14/2021    GFRESTIMATED 68 01/15/2018    GFRESTBLACK >60 06/14/2021    GFRESTBLACK 82 01/15/2018    AGUSTINA 8.9 05/25/2023     AGUSTINA 8.7 01/15/2018        INR RESULTS:  No results found for: INR    Procedures:      Assessment and Plan:     Paroxysmal atrial fibrillation     It is encouraging that patient is doing very well and her atrial fibrillation is well controlled by metoprolol XL 75 mg once daily. We will continue current medications and see patient again in approximately 1 year.  All questions and concerns were addressed and patient is happy with the plan.      CC  Patient Care Team:  Chang Vora MD as PCP - Mayra Quinonez MD as MD (Cardiology)  Kelley Petit RN as Specialty Care Coordinator (Cardiology)  Chang Vora MD as Assigned PCP  David Malagon DO as Assigned Neuroscience Provider  Libby Castorena MD as MD (OB/Gyn)

## 2023-08-03 ENCOUNTER — THERAPY VISIT (OUTPATIENT)
Dept: PHYSICAL THERAPY | Facility: CLINIC | Age: 63
End: 2023-08-03
Attending: PHYSICAL MEDICINE & REHABILITATION
Payer: MEDICARE

## 2023-08-03 DIAGNOSIS — M54.2 CERVICAL SPINE PAIN: ICD-10-CM

## 2023-08-03 DIAGNOSIS — M43.16 SPONDYLOLISTHESIS OF LUMBAR REGION: ICD-10-CM

## 2023-08-03 DIAGNOSIS — M54.2 CHRONIC NECK PAIN: Primary | ICD-10-CM

## 2023-08-03 DIAGNOSIS — G89.29 CHRONIC BACK PAIN: ICD-10-CM

## 2023-08-03 DIAGNOSIS — M53.3 SACRAL PAIN: ICD-10-CM

## 2023-08-03 DIAGNOSIS — M47.816 ARTHROPATHY OF LUMBAR FACET JOINT: ICD-10-CM

## 2023-08-03 DIAGNOSIS — M48.02 CERVICAL STENOSIS OF SPINAL CANAL: ICD-10-CM

## 2023-08-03 DIAGNOSIS — M54.9 CHRONIC BACK PAIN: ICD-10-CM

## 2023-08-03 DIAGNOSIS — G89.29 CHRONIC NECK PAIN: Primary | ICD-10-CM

## 2023-08-03 PROCEDURE — 97110 THERAPEUTIC EXERCISES: CPT | Mod: GP | Performed by: PHYSICAL THERAPIST

## 2023-08-03 PROCEDURE — 97161 PT EVAL LOW COMPLEX 20 MIN: CPT | Mod: GP | Performed by: PHYSICAL THERAPIST

## 2023-08-03 NOTE — PROGRESS NOTES
PHYSICAL THERAPY EVALUATION  Type of Visit: Evaluation    See electronic medical record for Abuse and Falls Screening details.    Subjective       Presenting condition or subjective complaint: Neck pain and diagnosis from MRI  Notes that since visit with MD has been more aware of symptoms, trying to monitor posture. No HA. Notes that at times when riding horses would have symptoms down arms, has been riding gentler.  Has previously been through PT for low back, not currently doing exercises but would like to continue core strengthening. Celebrex is helping. LBP is improved w/ riding, using core. (History of R scapular and rib fracture)  Date of onset: 02/03/23    Relevant medical history: Arthritis; Hearing problems; History of fractures; Menopause; Neck injury; Thyroid problems   Dates & types of surgery:      Prior diagnostic imaging/testing results: MRI; X-ray; Bone scan   Cervical spine pain with DDD and mod stenosis C5-6, L4-5 spondy    Prior therapy history for the same diagnosis, illness or injury: Yes strengthening core for lower back pain 2+years ago  Also h/o SI injections, most recently in January w/o benefit    Prior Level of Function  Transfers:   Ambulation:   ADL:   IADL:     Living Environment  Social support: With a significant other or spouse   Type of home: House   Stairs to enter the home: Yes 5 Is there a railing: Yes   Ramp: No   Stairs inside the home: Yes 15 Is there a railing: Yes   Help at home: None  Equipment owned:       Employment: Yes finance  Hobbies/Interests: Equestrian (dressage), downhill skiing, golf    Patient goals for therapy: prevent injury to neck/back due to activities    Pain assessment: See objective evaluation for additional pain details     Objective   LUMBAR SPINE EVALUATION  PAIN: Pain Level at Rest: 3/10  Pain Level with Use: 7/10  Pain Location: cervical spine, thoracic spine, and lumbar spine  INTEGUMENTARY (edema, incisions):   POSTURE:   GAIT:   Weightbearing  Status:   Assistive Device(s):   Gait Deviations:   BALANCE/PROPRIOCEPTION:   WEIGHTBEARING ALIGNMENT:   NON-WEIGHTBEARING ALIGNMENT:    ROM:   Flexion to floor, posterior chain tightness. Extension mild limitation. SB to L mod limitation, SB R min limitation  PELVIC/SI SCREEN:   STRENGTH:     MYOTOMES: WNL  DTR S:   CORD SIGNS:   DERMATOMES: WNL  NEURAL TENSION:   FLEXIBILITY:   L >R hamstring tightness, gluteals  LUMBAR/HIP Special Tests:    PELVIS/SI SPECIAL TESTS:   FUNCTIONAL TESTS:   PALPATION:  ttp L L4/5 paraspinals, QL increased tone/tension L> R  SPINAL SEGMENTAL CONCLUSIONS:   Hypomobile L > R w/ Gillets  L > R hypomobility PSIS w/ forward flexion    CERVICAL SPINE EVALUATION  PAIN: Pain is Exacerbated By: certain activities  INTEGUMENTARY (edema, incisions):   POSTURE:   GAIT:   Weightbearing Status:   Assistive Device(s):   Gait Deviations:   BALANCE/PROPRIOCEPTION:   WEIGHTBEARING ALIGNMENT:   ROM:   (Degrees) Left AROM Right AROM    Cervical Flexion     Cervical Extension     Cervical Side bend Min limitation Mod limitation    Cervical Rotation Mod limitation Mod limitation    Cervical Protrusion     Cervical Retraction     Thoracic Flexion wnl    Thoracic Extension To neutral    Thoracic Rotation Mild limitation Limited 25% greater compared to L     Left AROM Left PROM Right AROM Right PROM   Shoulder Flexion       Shoulder Extension       Shoulder Abduction       Shoulder Adduction       Shoulder IR       Shoulder ER       Shoulder Horiz Abduction       Shoulder Horiz Adduction       Shoulder AROM WFL  End Feel:     MYOTOMES: WNL  DTR S:   CORD SIGNS:   DERMATOMES: WNL  NEURAL TENSION:   FLEXIBILITY:  decreased b pec, UT, LS   SPECIAL TESTS:   PALPATION:  ttp paraspinals upper thoracic spine. Increased tone/tendeness noted L > R UT.   SPINAL SEGMENTAL CONCLUSIONS:  hypomobility mid thoracic spine      Assessment & Plan   CLINICAL IMPRESSIONS  Medical Diagnosis: Cervical spine pain (M54.2)     Cervical stenosis of spinal canal (M48.02)    Spondylolisthesis of lumbar region (M43.16)    Sacral pain (M53.3)    Arthropathy of lumbar facet joint (M47.816)    Treatment Diagnosis: Chronic neck and back pain   Impression/Assessment: Patient is a 63 year old female with chronic neck and back complaints.  The following significant findings have been identified: Pain, Decreased ROM/flexibility, Decreased joint mobility, Decreased strength, Impaired muscle performance, and Impaired posture. These impairments interfere with their ability to perform self care tasks, work tasks, recreational activities, and household chores as compared to previous level of function.     Clinical Decision Making (Complexity):  Clinical Presentation: Stable/Uncomplicated  Clinical Presentation Rationale: based on medical and personal factors listed in PT evaluation  Clinical Decision Making (Complexity): Low complexity    PLAN OF CARE  Treatment Interventions:  Interventions: Manual Therapy, Neuromuscular Re-education, Therapeutic Activity, Therapeutic Exercise, Self-Care/Home Management    Long Term Goals     PT Goal 1  Goal Identifier: 1  Goal Description: Pt will be able to demonstrate appropriate posture seated for 30 minutes without increased symptoms to allow full participation in ADLs and recreational activities  Rationale: to maximize safety and independence with performance of ADLs and functional tasks;to maximize safety and independence within the home;to maximize safety and independence within the community;to maximize safety and independence with self cares  Target Date: 10/26/23      Frequency of Treatment: 2x/week  Duration of Treatment: 12 weeks    Recommended Referrals to Other Professionals:  na  Education Assessment:        Risks and benefits of evaluation/treatment have been explained.   Patient/Family/caregiver agrees with Plan of Care.     Evaluation Time:     PT Eval, Low Complexity Minutes (91679): 20       Signing  Clinician: Zehra Borja, PT      [facility]                                                                                   OUTPATIENT PHYSICAL THERAPY      PLAN OF TREATMENT FOR OUTPATIENT REHABILITATION   Patient's Last Name, First Name, Marilee Sutton YOB: 1960   Provider's Name   [facility]   Medical Record No.  [id number]     Onset Date: 02/03/23  Start of Care Date: 08/03/23     Medical Diagnosis:  Cervical spine pain (M54.2)    Cervical stenosis of spinal canal (M48.02)    Spondylolisthesis of lumbar region (M43.16)    Sacral pain (M53.3)    Arthropathy of lumbar facet joint (M47.816)      PT Treatment Diagnosis:  Chronic neck and back pain Plan of Treatment  Frequency/Duration: 2x/week/ 12 weeks    Certification date from 08/03/23 to 10/26/23         See note for plan of treatment details and functional goals     Zehra Borja, PT                         I CERTIFY THE NEED FOR THESE SERVICES FURNISHED UNDER        THIS PLAN OF TREATMENT AND WHILE UNDER MY CARE     (Physician attestation of this document indicates review and certification of the therapy plan).                Referring Provider:  David Malagon      Initial Assessment  See Epic Evaluation- Start of Care Date: 08/03/23

## 2023-08-08 ENCOUNTER — THERAPY VISIT (OUTPATIENT)
Dept: PHYSICAL THERAPY | Facility: CLINIC | Age: 63
End: 2023-08-08
Payer: MEDICARE

## 2023-08-08 DIAGNOSIS — G89.29 CHRONIC BACK PAIN: ICD-10-CM

## 2023-08-08 DIAGNOSIS — G89.29 CHRONIC NECK PAIN: Primary | ICD-10-CM

## 2023-08-08 DIAGNOSIS — M54.2 CHRONIC NECK PAIN: Primary | ICD-10-CM

## 2023-08-08 DIAGNOSIS — M54.9 CHRONIC BACK PAIN: ICD-10-CM

## 2023-08-08 PROCEDURE — 97140 MANUAL THERAPY 1/> REGIONS: CPT | Mod: GP | Performed by: PHYSICAL THERAPIST

## 2023-08-08 PROCEDURE — 97110 THERAPEUTIC EXERCISES: CPT | Mod: GP | Performed by: PHYSICAL THERAPIST

## 2023-08-10 ENCOUNTER — THERAPY VISIT (OUTPATIENT)
Dept: PHYSICAL THERAPY | Facility: CLINIC | Age: 63
End: 2023-08-10
Attending: PHYSICAL MEDICINE & REHABILITATION
Payer: MEDICARE

## 2023-08-10 DIAGNOSIS — G89.29 CHRONIC BACK PAIN: ICD-10-CM

## 2023-08-10 DIAGNOSIS — M54.2 CHRONIC NECK PAIN: Primary | ICD-10-CM

## 2023-08-10 DIAGNOSIS — G89.29 CHRONIC NECK PAIN: Primary | ICD-10-CM

## 2023-08-10 DIAGNOSIS — M54.9 CHRONIC BACK PAIN: ICD-10-CM

## 2023-08-10 PROCEDURE — 97110 THERAPEUTIC EXERCISES: CPT | Mod: GP | Performed by: PHYSICAL THERAPIST

## 2023-08-10 PROCEDURE — 97140 MANUAL THERAPY 1/> REGIONS: CPT | Mod: GP | Performed by: PHYSICAL THERAPIST

## 2023-08-14 ENCOUNTER — THERAPY VISIT (OUTPATIENT)
Dept: PHYSICAL THERAPY | Facility: CLINIC | Age: 63
End: 2023-08-14
Payer: MEDICARE

## 2023-08-14 DIAGNOSIS — M54.2 CHRONIC NECK PAIN: Primary | ICD-10-CM

## 2023-08-14 DIAGNOSIS — G89.29 CHRONIC NECK PAIN: Primary | ICD-10-CM

## 2023-08-14 DIAGNOSIS — G89.29 CHRONIC BACK PAIN: ICD-10-CM

## 2023-08-14 DIAGNOSIS — M54.9 CHRONIC BACK PAIN: ICD-10-CM

## 2023-08-14 PROCEDURE — 97110 THERAPEUTIC EXERCISES: CPT | Mod: GP | Performed by: PHYSICAL THERAPIST

## 2023-08-14 PROCEDURE — 97140 MANUAL THERAPY 1/> REGIONS: CPT | Mod: GP | Performed by: PHYSICAL THERAPIST

## 2023-08-15 NOTE — PATIENT INSTRUCTIONS
Marilee    Thank you for entrusting your care with us at the [facility].      We are prescribing some compression stockings for you. I have included different suppliers that should help you get measured and fitting to ensure proper fitting socks. You should wear these stockings as much as you can. It is especially important to wear them with long periods of standing, sitting, long car rides or if you will be flying. Compression socks should get refilled every 4-6 months. They do not need to be worn at night while in bed. It is recommended to wear compression level of 20-30mmhg or higher from toes to knees.      We will perform an ultrasound today to evaluate the valves in your veins.      We would like you to follow up in 3 months after wearing socks to see how you are doing.      Varicose Veins    Varicose veins are twisted, enlarged veins near the surface of the skin. They develop most often in the legs and ankles.    Some people may be more likely than others to get varicose veins because of several things. These include aging, pregnancy, being overweight, or because a parent has them. Standing or sitting for long periods of time can also increase risk of varicose veins.    Follow-up care is a key part of your treatment and safety. Be sure to make and go to all appointments, and call your doctor if you are having problems. It's also a good idea to know your test results and keep a list of the medicines you take.      Varicose veins are caused by weakened valves and veins in your legs. Normally, one-way valves in your veins keep blood flowing from your legs up toward your heart. When these valves don't work as they should, blood collects in your legs, and pressure builds up. The veins become weak, large, and twisted.    How can you care for yourself at home?  Wear compression stockings during the day to help relieve symptoms and improve blood flow. Talk to your doctor about which ones to get  and where to get them.  Prop up your legs at or above the level of your heart when possible. Try to do this for about 30 minutes at a time, about 3 times a day. This helps keep the blood from pooling in your lower legs and improves blood flow to the rest of your body.  Avoid sitting and standing for long periods. This puts added stress on your veins.  Stay at a healthy weight. Lose weight if you need to.  Try to take several short walks every day.  Get at least 30 minutes of exercise on most days of the week. Walking is a good choice. You also may want to do other activities, such as running, swimming, cycling, or playing tennis or team sports.  Do calf muscle exercises every day. When you are sitting down, rotate your feet at the ankles in both directions, making small circles. Extend your legs, and point and flex your feet.  Avoid crossing your legs at the knees when sitting.  Take good care of your skin. Treat cuts and scrapes on your legs right away. Keep your legs clean and moisturized to prevent drying and cracking. Prevent sunburns.  Do not smoke. Smoking can make varicose veins worse. If you need help quitting, talk to your doctor about stop-smoking programs and medicines. These can increase your chances of quitting for good.  If you bump your leg so hard that you know it is likely to bruise, prop up your leg and apply ice or cold packs right away. Apply the ice or cold pack for 10 to 20 minutes, 3 or more times a day. Put a thin cloth between the ice and your skin.  If you cut or scratch the skin over a vein, it may bleed a lot. Prop up your leg and apply firm pressure for a full 15 minutes.  If you have a blood clot in a varicose vein, you may have tenderness and swelling over the vein. The vein may feel firm. Be sure to call your doctor right away if you have these symptoms. If your doctor has told you how to care for the clot, follow the instructions.     Care may include the following:    Prop up your  leg and apply a damp cloth that is warm or cool.  Ask your doctor if you can take an over-the-counter pain medicine, such as acetaminophen (Tylenol), ibuprofen (Advil, Motrin), or naproxen (Aleve). Be safe with medicines. Read and follow all instructions on the label.    When should you call for help?     Call 911 anytime you think you may need emergency care. For example, call if:    You have sudden chest pain and shortness of breath, or you cough up blood.    Call your doctor now or seek immediate medical care if:    You have signs of a blood clot in your leg (called a deep vein thrombosis), such as:  Pain in your calf, back of the knee, thigh, or groin.  Swelling in the leg or groin.  A color change on the leg or groin. The skin may be reddish or purplish, depending on your usual skin color.  A varicose vein begins to bleed and you cannot stop it.  You have a tender lump in your leg.  You get an open sore.    Watch closely for changes in your health, and be sure to contact your doctor if:    Your varicose vein symptoms do not improve with home treatment.    Current as of: December 19, 2022  Author: Healthwise Staff  Medical Review:Rakan Boles MD - Family Medicine & DUSTY Ridley MD - Internal Medicine & Scooby Pimentel MD - Family Medicine & Nate Clark MD - Family Medicine & Joe Rodriguez MD - Diagnostic Radiology    Please bring your compression prescription to a home medical supply store. Here is a list of locations but not limited to.     [facility]  [address] [place] [address]  Phone: [phone]  Fax: [phone] [facility]  [address] [place] [address]  Phone: [phone]  Fax: [phone]   [facility]  [address] [place] [address]  Phone: [phone]  Fax:  [phone] [facility]  [address] [place] [address]  Phone: [phone]  Fax: [phone]   [facility]  [address] [place] [address]  Phone: [phone] [facility]   [address] ([facility])  [place] [address]  Phone: [phone]   [facility]  [address]  [address] [place] [address]      Phone: [phone]  Fax: [phone] [place]  [address] [place] [address]      Phone: [phone]  Fax: [phone]     [facility] [email or web address]  [address], [place] [address]  [phone]    [facility]  [email or web address]  [address] [place] [address]  Phone: [phone]     [address] [place] [address]  Phone: [phone]    [address] [place] [address]  Phone: [phone] [address], [place] [address]  Phone: [phone] [address], [place] [address]  Phone: [phone]    [facility] [email or web address]  [address] [place] [address]  Phone: [phone]    [address] [place] [address]  Phone: [phone]    [facility], [place] [address]  Phone: [phone]    Sindhu Davis  [email or web address]  [phone]         Surgery for Varicose Veins  If you have large varicose veins, surgery may be the best choice. But it will not prevent new varicose veins from forming. Surgery is most often done in a hospital or surgery center on an outpatient basis.  Varicose vein surgery    What is microphlebectomy?  Phlebectomy (say \"eokp-PAF-sqk-cara\") is a procedure used to remove varicose veins. These are twisted and enlarged veins near the surface of the skin. The procedure is also called microphlebectomy, ambulatory phlebectomy or stab avulsion.    How is the procedure done?  The procedure is " usually done in your doctor's office. You will get medicine to numb the area.  Your doctor will make several tiny cuts (incisions) in the skin. The varicose veins will be removed through the cuts.  You most likely will not need stitches to close the cuts.    What can you expect after the procedure?  Your doctor may wrap your leg in a bandage. You may also wear compression stockings. Your doctor will tell you how long to wear them.  You can go home the same day. You will probably be able to do your usual activities the next day. You may have a little bruising and numbness.  Follow-up care is a key part of your treatment and safety. Be sure to make and go to all appointments, and call your doctor if you are having problems. It's also a good idea to know your test results and keep a list of the medicines you take.    Current as of: December 19, 2022  Author: Healthwise Staff  Medical Review:Venice Fernandes MD - Family Medicine & Scooby Pimentel MD - Family Medicine & Fox Dickens MD - Vascular Surgery    Marilee,  Your visit to [facility] for your surgery or procedure is coming soon and we look forward to seeing you! This friendly reminder and pre-procedure checklist will help to ensure your procedure/surgery goes smoothly and meets your expectations. At [facility], our goal is to provide you with a great patient experience and to deliver genuine, professional care to every patient.   Please complete all the steps in advance of your visit. If you have any questions about the items listed below, please give our office a call. We can be reached at [phone] or visit our website at [email or web address]  for more information.     Procedure: Stab Phlebectomy  Surgeon: MD Keshawn Pablo  Procedure Date :  TBD  Procedure Location: TBD  Procedure Time :  TBD  Arrival Time: TBD  Admission Type: Outpatient  Post Operative Appointment with Dr Pablo: 2 week  after surgery    IF YOU NEED TO RESCHEDULE OR CANCEL YOUR PROCEDURE FOR ANY REASON PLEASE CALL THE CLINIC AS SOON AS POSSIBLE -[phone].    Complete the following checklist before your procedure/surgery    [] Contact your insurance regarding coverage. We will do a prior authorization, but please be aware this doesn't mean you are covered at 100%. If your insurance has changed please notify us. We will do this at your 3 month follow up after you have tried conservative therapy with compression stockings.  Please check with your insurance for coverage and your out of pocket.  Stab phlebectomy outpatient procedure code: 91961  Stab phlebectomy in clinic procedure code: 41867  Vein ligation procedure code: 11940  If you would like a Good Mae[facility] Estimate for your upcoming service/procedure contact Cost of Care Estimates at [phone], advocates are available Monday - Friday 8am - 5pm.  You may also submit a request online through your [employer] account.  If your procedure is at [facility] please contact the numbers below for Cost of Care Estimates.   - Facility Charge: [phone]    Anesthesiology charge:  [phone]    For all self-pay, estimate, or anesthesia billing questions at [facility], the contact information is below:  Who to contact: Zora DAHL  [facility] Network number: [phone]  Prepay number: [phone]    [] Pre-Operative Physical   You will need a Pre-Op physical within 30 days of surgery date from your primary provider.     [] Pre-Operative Medication Instructions  Contact your prescribing provider for instructions before discontinuing any medications including anticoagulants. (Examples: Coumadin, Heparin, Xarelto, Eliquis, Plavix, Pradaxa, Effiient, Briliant) We would like you stop them 3-5 days before surgery, depending on the medicaion. HOWEVER, please follow the advice of your primary care provider. Most surgeons will have you remain on your  aspirin.      Not Applicable  Tell your healthcare provider about all medicines you take and any allergies you may have.      [] Fasting Requirements  Nothing to eat for 8 hours before surgery unless instructed differently by the surgery nurse.   You may have clear liquids up to two hours before your arrival time (coffee, tea, water, or Gatorade. No cream or milk)  If your primary care provider has instructed you to continue oral medications, you may take them on the morning of surgery with a small sip of water.    No alcohol or smoking after 12:00am the day of surgery.    [] You will receive a call from a surgery nurse 3-5 days prior to surgery.     []DO NOT BRING FMLA WITH TO SURGERY.  These should be sent to the provider's office by fax to [phone]     Discharge Instructions for Varicose Vein Surgery    You had a procedure in which your varicose veins were surgically removed. Here s what you can do following surgery to help with your recovery.  Home care Recommendations for taking care of yourself at home include the following:    Ask someone to help you run errands or do household chores for a few days after surgery.    Keep your legs raised when you re sitting or lying down.    Begin a regular walking program, starting the day after surgery. Just walk for a few minutes at first; then work up to 5 minutes at a time. Gradually increase to 15 to 20 minutes at a time, 2 to 3 times a day.      Post Op Instructions:    You will be discharged with compression wraps from your toe to thigh and will wear the wraps for 5-7 days. You can remove the wraps to shower after 48 hours. Please ensure you continue wrapping your leg with the compression wrap for at least the next 5 days following surgery. You can transition if it is comfortable enough into thigh high compression stockings  for 1 month. You should then continue to wear your compression as much as possible.     No flying for 3 weeks post vein surgery.    For the  first 2 weeks after surgery, avoid sitting and standing for long periods. Also, avoid lifting greater 20 lbs.     Take pain medication as prescribed. You are unable to drive until off of narcotics. Elevate the affected extremity as much as possible.    It is normal to have fluid drainage, some bruising, numbness and discoloration post operation, these are temporary.     Walking will help you recover more quickly.    It is ok to massage areas where the veins were taken out after dressings are removed.    You will receive pain medication after your surgery, take as needed. Take pain medication as prescribed. You are unable to drive until off of narcotics.    Ice use for the first 5-7 days is encouraged on areas of discomfort. 30 minutes on and 30 minutes off.     If you can take anti-inflammatories like motrin, advil, or ibuprofen with every meal or evening. Dose 400-600 mg at each dose. It is good to have food or something in your stomach.    For questions after business hours please call Dr. Keshawn Pablo at [phone] or for Dr. Shama Reed please call [phone].    For questions during business hours please call [phone]  8:00 am - 4:30 pm    Please do not hesitate to call us for questions or concerns.    After your stab phlebectomy we would like to see you two weeks after for follow up. If you don't already have a follow up appointment you should be seen at [facility] in 2 weeks.       Call your healthcare provider right away if you have any of the following:  Severe bleeding, redness, or drainage at the incision sites  Development of an ulcer (sore) at the incision sites  Numbness or tingling in legs or feet  Increasing leg pain or swelling  Fever, shaking, or chills  Chest pain or shortness of breath

## 2023-08-16 ENCOUNTER — THERAPY VISIT (OUTPATIENT)
Dept: PHYSICAL THERAPY | Facility: CLINIC | Age: 63
End: 2023-08-16
Attending: PHYSICAL MEDICINE & REHABILITATION
Payer: MEDICARE

## 2023-08-16 DIAGNOSIS — G89.29 CHRONIC BACK PAIN: ICD-10-CM

## 2023-08-16 DIAGNOSIS — G89.29 CHRONIC NECK PAIN: Primary | ICD-10-CM

## 2023-08-16 DIAGNOSIS — M54.9 CHRONIC BACK PAIN: ICD-10-CM

## 2023-08-16 DIAGNOSIS — M54.2 CHRONIC NECK PAIN: Primary | ICD-10-CM

## 2023-08-16 PROCEDURE — 97110 THERAPEUTIC EXERCISES: CPT | Mod: GP | Performed by: PHYSICAL THERAPIST

## 2023-08-21 ENCOUNTER — THERAPY VISIT (OUTPATIENT)
Dept: PHYSICAL THERAPY | Facility: CLINIC | Age: 63
End: 2023-08-21
Payer: MEDICARE

## 2023-08-21 DIAGNOSIS — M54.9 CHRONIC BACK PAIN: ICD-10-CM

## 2023-08-21 DIAGNOSIS — M54.2 CHRONIC NECK PAIN: Primary | ICD-10-CM

## 2023-08-21 DIAGNOSIS — G89.29 CHRONIC NECK PAIN: Primary | ICD-10-CM

## 2023-08-21 DIAGNOSIS — G89.29 CHRONIC BACK PAIN: ICD-10-CM

## 2023-08-21 PROCEDURE — 97110 THERAPEUTIC EXERCISES: CPT | Mod: GP | Performed by: PHYSICAL THERAPIST

## 2023-08-22 NOTE — PROGRESS NOTES
[facility]        Patient is here for a consult to discuss varicose vein(s). Dr. Vora referring. The patient has varicose veins that are problematic in right, left, and bilateral legs. Symptoms patient has been experiencing are pain and swelling. Patient states their varicose veins are bothersome with activities. Patient has not been wearing compression stockings. Patient has not been using pain medication or anti-inflammatory's. Patient  has and has not had recent imaging on legs done.    Pt is currently taking no meds that would impact our treatment plan.    LMP 07/06/2008 (Approximate)     The provider has been notified that the patient has no concerns.     Questions patient would like addressed today are: N/A.    Refills are needed: No    Has homecare services and agency name:  No

## 2023-08-23 ENCOUNTER — OFFICE VISIT (OUTPATIENT)
Dept: VASCULAR SURGERY | Facility: CLINIC | Age: 63
End: 2023-08-23
Attending: INTERNAL MEDICINE
Payer: MEDICARE

## 2023-08-23 ENCOUNTER — ANCILLARY PROCEDURE (OUTPATIENT)
Dept: VASCULAR ULTRASOUND | Facility: CLINIC | Age: 63
End: 2023-08-23
Attending: SPECIALIST
Payer: MEDICARE

## 2023-08-23 VITALS
TEMPERATURE: 97.8 F | BODY MASS INDEX: 23.72 KG/M2 | HEART RATE: 59 BPM | WEIGHT: 156 LBS | OXYGEN SATURATION: 100 % | SYSTOLIC BLOOD PRESSURE: 171 MMHG | DIASTOLIC BLOOD PRESSURE: 91 MMHG

## 2023-08-23 DIAGNOSIS — I83.893 SYMPTOMATIC VARICOSE VEINS OF BOTH LOWER EXTREMITIES: Primary | ICD-10-CM

## 2023-08-23 DIAGNOSIS — I83.893 SYMPTOMATIC VARICOSE VEINS OF BOTH LOWER EXTREMITIES: ICD-10-CM

## 2023-08-23 DIAGNOSIS — I83.811 VARICOSE VEINS OF RIGHT LOWER EXTREMITY WITH PAIN: ICD-10-CM

## 2023-08-23 PROBLEM — M25.311 ROTATOR CUFF INSUFFICIENCY OF RIGHT SHOULDER: Status: ACTIVE | Noted: 2019-02-13

## 2023-08-23 PROBLEM — G25.89 SCAPULAR DYSKINESIS: Status: ACTIVE | Noted: 2019-02-13

## 2023-08-23 PROBLEM — N60.09 SOLITARY CYST OF BREAST: Status: ACTIVE | Noted: 2023-08-23

## 2023-08-23 PROCEDURE — 99204 OFFICE O/P NEW MOD 45 MIN: CPT | Performed by: SPECIALIST

## 2023-08-23 PROCEDURE — G0463 HOSPITAL OUTPT CLINIC VISIT: HCPCS | Mod: 25 | Performed by: SPECIALIST

## 2023-08-23 PROCEDURE — 93970 EXTREMITY STUDY: CPT

## 2023-08-23 RX ORDER — VARENICLINE 0.03 MG/.05ML
1 SPRAY NASAL 2 TIMES DAILY
COMMUNITY
Start: 2023-07-21

## 2023-08-23 RX ORDER — LIFITEGRAST 50 MG/ML
SOLUTION/ DROPS OPHTHALMIC
COMMUNITY

## 2023-08-23 RX ORDER — NIACIN 50 MG
50 TABLET ORAL
COMMUNITY
End: 2024-07-02

## 2023-08-23 RX ORDER — ESTRADIOL AND NORETHINDRONE ACETATE .5; .1 MG/1; MG/1
TABLET ORAL
COMMUNITY
Start: 2023-01-24 | End: 2024-07-02

## 2023-08-23 RX ORDER — POLYETHYLENE GLYCOL 3350 17 G/17G
POWDER, FOR SOLUTION ORAL
COMMUNITY
Start: 2021-12-29

## 2023-08-23 RX ORDER — ESTRADIOL 0.1 MG/G
CREAM VAGINAL
COMMUNITY
Start: 2023-01-24

## 2023-08-23 ASSESSMENT — PAIN SCALES - GENERAL: PAINLEVEL: NO PAIN (0)
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
## 2023-08-24 ENCOUNTER — THERAPY VISIT (OUTPATIENT)
Dept: PHYSICAL THERAPY | Facility: CLINIC | Age: 63
End: 2023-08-24
Payer: MEDICARE

## 2023-08-24 DIAGNOSIS — M54.9 CHRONIC BACK PAIN: ICD-10-CM

## 2023-08-24 DIAGNOSIS — M54.2 CHRONIC NECK PAIN: Primary | ICD-10-CM

## 2023-08-24 DIAGNOSIS — G89.29 CHRONIC NECK PAIN: Primary | ICD-10-CM

## 2023-08-24 DIAGNOSIS — G89.29 CHRONIC BACK PAIN: ICD-10-CM

## 2023-08-24 PROCEDURE — 97110 THERAPEUTIC EXERCISES: CPT | Mod: GP | Performed by: PHYSICAL THERAPIST

## 2023-08-31 ENCOUNTER — THERAPY VISIT (OUTPATIENT)
Dept: PHYSICAL THERAPY | Facility: CLINIC | Age: 63
End: 2023-08-31
Payer: MEDICARE

## 2023-08-31 DIAGNOSIS — G89.29 CHRONIC NECK PAIN: Primary | ICD-10-CM

## 2023-08-31 DIAGNOSIS — M54.2 CHRONIC NECK PAIN: Primary | ICD-10-CM

## 2023-08-31 DIAGNOSIS — M54.9 CHRONIC BACK PAIN: ICD-10-CM

## 2023-08-31 DIAGNOSIS — G89.29 CHRONIC BACK PAIN: ICD-10-CM

## 2023-08-31 PROCEDURE — 97035 APP MDLTY 1+ULTRASOUND EA 15: CPT | Mod: GP | Performed by: PHYSICAL THERAPIST

## 2023-08-31 PROCEDURE — 97140 MANUAL THERAPY 1/> REGIONS: CPT | Mod: GP | Performed by: PHYSICAL THERAPIST

## 2023-08-31 PROCEDURE — 97110 THERAPEUTIC EXERCISES: CPT | Mod: GP | Performed by: PHYSICAL THERAPIST

## 2023-09-05 ENCOUNTER — THERAPY VISIT (OUTPATIENT)
Dept: PHYSICAL THERAPY | Facility: CLINIC | Age: 63
End: 2023-09-05
Payer: MEDICARE

## 2023-09-05 DIAGNOSIS — G89.29 CHRONIC BACK PAIN: ICD-10-CM

## 2023-09-05 DIAGNOSIS — G89.29 CHRONIC NECK PAIN: Primary | ICD-10-CM

## 2023-09-05 DIAGNOSIS — M54.2 CHRONIC NECK PAIN: Primary | ICD-10-CM

## 2023-09-05 DIAGNOSIS — M54.9 CHRONIC BACK PAIN: ICD-10-CM

## 2023-09-05 PROCEDURE — 97110 THERAPEUTIC EXERCISES: CPT | Mod: GP | Performed by: PHYSICAL THERAPIST

## 2023-09-07 ENCOUNTER — THERAPY VISIT (OUTPATIENT)
Dept: PHYSICAL THERAPY | Facility: CLINIC | Age: 63
End: 2023-09-07
Payer: MEDICARE

## 2023-09-07 DIAGNOSIS — G89.29 CHRONIC BACK PAIN: ICD-10-CM

## 2023-09-07 DIAGNOSIS — M54.9 CHRONIC BACK PAIN: ICD-10-CM

## 2023-09-07 DIAGNOSIS — G89.29 CHRONIC NECK PAIN: Primary | ICD-10-CM

## 2023-09-07 DIAGNOSIS — M54.2 CHRONIC NECK PAIN: Primary | ICD-10-CM

## 2023-09-07 PROCEDURE — 97110 THERAPEUTIC EXERCISES: CPT | Mod: GP | Performed by: PHYSICAL THERAPIST

## 2023-09-12 ENCOUNTER — THERAPY VISIT (OUTPATIENT)
Dept: PHYSICAL THERAPY | Facility: CLINIC | Age: 63
End: 2023-09-12
Payer: MEDICARE

## 2023-09-12 DIAGNOSIS — G89.29 CHRONIC BACK PAIN: ICD-10-CM

## 2023-09-12 DIAGNOSIS — M54.9 CHRONIC BACK PAIN: ICD-10-CM

## 2023-09-12 DIAGNOSIS — M54.2 CHRONIC NECK PAIN: Primary | ICD-10-CM

## 2023-09-12 DIAGNOSIS — M54.50 LUMBAR SPINE PAIN: ICD-10-CM

## 2023-09-12 DIAGNOSIS — G89.29 CHRONIC NECK PAIN: Primary | ICD-10-CM

## 2023-09-12 PROCEDURE — 97110 THERAPEUTIC EXERCISES: CPT | Mod: GP | Performed by: PHYSICAL THERAPIST

## 2023-09-12 PROCEDURE — 97530 THERAPEUTIC ACTIVITIES: CPT | Mod: GP | Performed by: PHYSICAL THERAPIST

## 2023-09-12 RX ORDER — CELECOXIB 200 MG/1
CAPSULE ORAL
Qty: 30 CAPSULE | Refills: 3 | Status: SHIPPED | OUTPATIENT
Start: 2023-09-12 | End: 2024-01-09

## 2023-09-12 NOTE — PROGRESS NOTES
09/07/23 0500   Appointment Info   Signing clinician's name / credentials Zehra Borja, PT DPT   Total/Authorized Visits 24   Visits Used 10   Medical Diagnosis Cervical spine pain (M54.2)    Cervical stenosis of spinal canal (M48.02)    Spondylolisthesis of lumbar region (M43.16)    Sacral pain (M53.3)    Arthropathy of lumbar facet joint (M47.816)   PT Tx Diagnosis Chronic neck and back pain   Quick Adds Certification   Progress Note/Certification   Start of Care Date 08/03/23   Onset of illness/injury or Date of Surgery 02/03/23   Therapy Frequency 2x/week   Predicted Duration 12 weeks   Certification date from 08/03/23   Certification date to 10/26/23   PT Goal 1   Goal Identifier 1   Goal Description Pt will be able to demonstrate appropriate posture seated for 30 minutes without increased symptoms to allow full participation in ADLs and recreational activities   Rationale to maximize safety and independence with performance of ADLs and functional tasks;to maximize safety and independence within the home;to maximize safety and independence within the community;to maximize safety and independence with self cares   Goal Progress had flare up last week which has since improved.   Target Date 10/26/23   Subjective Report   Subjective Report Pt notes that symptoms have improved since last week. Able to ride yesterday without increased symptoms.   Objective Measures   Objective Measures Objective Measure 1   Objective Measure 1   Objective Measure Cervical ROM   Details improved from prior session, equal rotation and sidebending noted   Treatment Interventions (PT)   Interventions Therapeutic Procedure/Exercise;Manual Therapy   Therapeutic Procedure/Exercise   Therapeutic Procedures: strength, endurance, ROM, flexibillity minutes (59646) 38   Ther Proc 1 Nustep   Ther Proc 1 - Details 5 minutes, level 4   Ther Proc 2 HEP   Ther Proc 2 - Details Comprehensive review of HEP with edu on parameterx   PTRx Ther Proc 1  [place] Stretch   PTRx Ther Proc 1 - Details not today   PTRx Ther Proc 2 Pretzel Stretch   PTRx Ther Proc 2 - Details Pretzel x 2 x 30 reps   PTRx Ther Proc 3  Hip Flexor Stretch Chang Test Position   PTRx Ther Proc 3 - Details not today   PTRx Ther Proc 4 Kneeling Hip Flexor Stretch   PTRx Ther Proc 4 - Details Kneeling 2 x 30 s B. cueing neutral pelvis   PTRx Ther Proc 5 Cervical ROM Flexion   PTRx Ther Proc 5 - Details No Notes   PTRx Ther Proc 6 Levator Scapulae Stretch   PTRx Ther Proc 6 - Details start w/o overpressure. performed to [place] R today 2 x 20 seconds in session.    PTRx Ther Proc 7 Upper Cervical/Deep Neck Flexor Strengthening   PTRx Ther Proc 7 - Details cueing chin tuck. x 10 reps no hold time   PTRx Ther Proc 8 Supine Abdominal Exercise #9B (Alternate Arm and Leg Extension With Feet Off the Floor)   PTRx Ther Proc 8 - Details 2 sets of 10 just LE from 90/90 no Arms overhead   PTRx Ther Proc 9 Single Leg Bridge   PTRx Ther Proc 9 - Details TA  and glutes engage. Progress from standard bridge to this   PTRx Ther Proc 10 Ball Prone Scapula W   PTRx Ther Proc 10 - Details Alternating w/ W x 20   PTRx Ther Proc 11 Ball Prone Scapula T   PTRx Ther Proc 11 - Details Alternating w/ T x 20   PTRx Ther Proc 12 Hip Extension In Neutral With Theraband   PTRx Ther Proc 12 - Details 2 sets of 10   PTRx Ther Proc 13 Standing Hip Abduction   PTRx Ther Proc 13 - Details 2 x 10 use red TB however note more difficulty on L > R   PTRx Ther Proc 14 Shoulder Theraband Rows   PTRx Ther Proc 14 - Details progress to blue TB   PTRx Ther Proc 15 Shoulder Theraband Extension   PTRx Ther Proc 15 - Details progress to blue TB   PTRx Ther Proc 16 Side Stepping With Theraband   PTRx Ther Proc 16 - Details red TB 1 x 20 and 1 x 15    PTRx Ther Proc 17 Lateral Step Down   PTRx Ther Proc 17 - Details 10   PTRx Ther Proc 18 Squat   PTRx Ther Proc 18 - Details Increased time cueing for positioning. verbal visual and  tactile cueing   PTRx Ther Proc 19 Bridging #1   PTRx Ther Proc 19 - Details x 3  progress per below   Skilled Intervention guided stretching and strengthening to address symptoms   Patient Response/Progress Improved tolerance today compared to prior sessions. Added strengthening back into HEP   Neuromuscular Re-education   PTRx Neuro Re-ed 1 Median Nerve Mobility   PTRx Neuro Re-ed 1 - Details No Notes   Plan   Home program ptrx - printed and elec   Updates to plan of care Continue current POC   Plan for next session Cont progressive upper back, core and glute strengthening   Total Session Time   Timed Code Treatment Minutes 38   Total Treatment Time (sum of timed and untimed services) 38       PLAN  Continue therapy per current plan of care.    Beginning/End Dates of Progress Note Reporting Period:  8/3/23  to 09/07/2023    Referring Provider:  David Malagon

## 2023-09-14 ENCOUNTER — THERAPY VISIT (OUTPATIENT)
Dept: PHYSICAL THERAPY | Facility: CLINIC | Age: 63
End: 2023-09-14
Payer: MEDICARE

## 2023-09-14 DIAGNOSIS — M54.9 CHRONIC BACK PAIN: ICD-10-CM

## 2023-09-14 DIAGNOSIS — G89.29 CHRONIC BACK PAIN: ICD-10-CM

## 2023-09-14 DIAGNOSIS — G89.29 CHRONIC NECK PAIN: Primary | ICD-10-CM

## 2023-09-14 DIAGNOSIS — M54.2 CHRONIC NECK PAIN: Primary | ICD-10-CM

## 2023-09-14 PROCEDURE — 97110 THERAPEUTIC EXERCISES: CPT | Mod: GP | Performed by: PHYSICAL THERAPIST

## 2023-09-18 ENCOUNTER — THERAPY VISIT (OUTPATIENT)
Dept: PHYSICAL THERAPY | Facility: CLINIC | Age: 63
End: 2023-09-18
Payer: MEDICARE

## 2023-09-18 DIAGNOSIS — G89.29 CHRONIC BACK PAIN: ICD-10-CM

## 2023-09-18 DIAGNOSIS — G89.29 CHRONIC NECK PAIN: Primary | ICD-10-CM

## 2023-09-18 DIAGNOSIS — M54.2 CHRONIC NECK PAIN: Primary | ICD-10-CM

## 2023-09-18 DIAGNOSIS — M54.9 CHRONIC BACK PAIN: ICD-10-CM

## 2023-09-18 PROCEDURE — 97110 THERAPEUTIC EXERCISES: CPT | Mod: GP | Performed by: PHYSICAL THERAPIST

## 2023-09-21 ENCOUNTER — THERAPY VISIT (OUTPATIENT)
Dept: PHYSICAL THERAPY | Facility: CLINIC | Age: 63
End: 2023-09-21
Payer: MEDICARE

## 2023-09-21 DIAGNOSIS — G89.29 CHRONIC BACK PAIN: ICD-10-CM

## 2023-09-21 DIAGNOSIS — G89.29 CHRONIC NECK PAIN: Primary | ICD-10-CM

## 2023-09-21 DIAGNOSIS — M54.9 CHRONIC BACK PAIN: ICD-10-CM

## 2023-09-21 DIAGNOSIS — M54.2 CHRONIC NECK PAIN: Primary | ICD-10-CM

## 2023-09-21 PROCEDURE — 97110 THERAPEUTIC EXERCISES: CPT | Mod: GP | Performed by: PHYSICAL THERAPIST

## 2023-09-25 ENCOUNTER — THERAPY VISIT (OUTPATIENT)
Dept: PHYSICAL THERAPY | Facility: CLINIC | Age: 63
End: 2023-09-25
Payer: MEDICARE

## 2023-09-25 DIAGNOSIS — M54.9 CHRONIC BACK PAIN: ICD-10-CM

## 2023-09-25 DIAGNOSIS — G89.29 CHRONIC BACK PAIN: ICD-10-CM

## 2023-09-25 DIAGNOSIS — M54.2 CHRONIC NECK PAIN: Primary | ICD-10-CM

## 2023-09-25 DIAGNOSIS — G89.29 CHRONIC NECK PAIN: Primary | ICD-10-CM

## 2023-09-25 PROCEDURE — 97110 THERAPEUTIC EXERCISES: CPT | Mod: GP | Performed by: PHYSICAL THERAPIST

## 2023-10-23 ENCOUNTER — THERAPY VISIT (OUTPATIENT)
Dept: PHYSICAL THERAPY | Facility: CLINIC | Age: 63
End: 2023-10-23
Payer: MEDICARE

## 2023-10-23 DIAGNOSIS — M54.9 CHRONIC BACK PAIN: ICD-10-CM

## 2023-10-23 DIAGNOSIS — M54.2 CHRONIC NECK PAIN: Primary | ICD-10-CM

## 2023-10-23 DIAGNOSIS — G89.29 CHRONIC NECK PAIN: Primary | ICD-10-CM

## 2023-10-23 DIAGNOSIS — G89.29 CHRONIC BACK PAIN: ICD-10-CM

## 2023-10-23 PROCEDURE — 97110 THERAPEUTIC EXERCISES: CPT | Mod: GP | Performed by: PHYSICAL THERAPIST

## 2023-10-23 PROCEDURE — 97530 THERAPEUTIC ACTIVITIES: CPT | Mod: GP | Performed by: PHYSICAL THERAPIST

## 2023-10-23 NOTE — PROGRESS NOTES
10/23/23 0500   Appointment Info   Signing clinician's name / credentials Zehra Borja PT DPT   Total/Authorized Visits 24   Visits Used 16   Medical Diagnosis Cervical spine pain (M54.2)    Cervical stenosis of spinal canal (M48.02)    Spondylolisthesis of lumbar region (M43.16)    Sacral pain (M53.3)    Arthropathy of lumbar facet joint (M47.816)   PT Tx Diagnosis Chronic neck and back pain   Other pertinent information Cont benefits from skilled intervention to improve activity tolerance and return to PLOF, requires guidance with ex to promote compliance and address symptoms and develop compehensive HEP for transition to when pt departs for [place] at end of we   Quick Adds Certification   Progress Note/Certification   Start of Care Date 08/03/23   Onset of illness/injury or Date of Surgery 02/03/23   Therapy Frequency 2x/week   Predicted Duration 12 weeks   Certification date from 08/03/23   Certification date to 10/26/23   KX Modifier Statement Therapy services meets medical necessity requirements beyond the therapy threshold for ongoing care.   PT Goal 1   Goal Identifier 1   Goal Description Pt will be able to demonstrate appropriate posture seated for 30 minutes without increased symptoms to allow full participation in ADLs and recreational activities   Rationale to maximize safety and independence with performance of ADLs and functional tasks;to maximize safety and independence within the home;to maximize safety and independence within the community;to maximize safety and independence with self cares   Goal Progress demosntrating improvements over past 1-2 weeks. However continuse to benefit from additional strengthening   Target Date 10/26/23   Subjective Report   Subjective Report Pt notes that is in [place], doing less exercises. Managing well with riding, also seeing a myofascial massage therapist.   Objective Measures   Objective Measures Objective Measure 1   Objective Measure 1   Objective  "Measure Sit to stand   Details 7 in 20 seconds, limited by knee pain   Treatment Interventions (PT)   Interventions Therapeutic Procedure/Exercise;Manual Therapy;Therapeutic Activity   Therapeutic Procedure/Exercise   Therapeutic Procedures: strength, endurance, ROM, flexibillity minutes (90339) 28   Ther Proc 1 Comprehensive review of HEP   Ther Proc 1 - Details Including parameters, duration, frequency to promote compliance and success with HEP   Ther Proc 2 Lateral step down   Ther Proc 2 - Details from 4\" step and progressing to 6\" step   Ther Proc 3 Sit to stands   Ther Proc 3 - Details standard height chair, no UE assist, occasional decreased eccentric control   PTRx Ther Proc 1 Mini squat   PTRx Ther Proc 1 - Details Cueing posterior weigh tshift   PTRx Ther Proc 2 Foward step down   PTRx Ther Proc 2 - Details from 4\" step   PTRx Ther Proc 3 Forward step up   PTRx Ther Proc 3 - Details 6\" step   PTRx Ther Proc 4 Static lunge   PTRx Ther Proc 4 - Details w/ UE assist   Skilled Intervention guided stretching and strengthening to address symptoms, and facilitate improved self management as pt leaving state in 1 week for the winter.   Patient Response/Progress I with HEP, motivated for continued management   Therapeutic Activity   Therapeutic Activities: dynamic activities to improve functional performance minutes (98278) 10   Ther Act 1 Floor transfer   Ther Act 1 - Details Discuss mechanics of floor transfer, recommendations on methodology, demonstrate and pt performs. Discuss muscles utilized with floor transfer   Skilled Intervention pt edu/functional transfers   Patient Response/Progress in understanding   Plan   Home program ptrx - printed and elec   Updates to plan of care dc to HEP - in [place] for winter   Total Session Time   Timed Code Treatment Minutes 38   Total Treatment Time (sum of timed and untimed services) 38         DISCHARGE  Reason for Discharge: Pt will be out of state for winter    Equipment " Issued: na    Discharge Plan: Patient to continue home program.    Referring Provider:  David Malagon

## 2024-01-09 DIAGNOSIS — M54.50 LUMBAR SPINE PAIN: ICD-10-CM

## 2024-01-10 RX ORDER — CELECOXIB 200 MG/1
CAPSULE ORAL
Qty: 30 CAPSULE | Refills: 0 | Status: SHIPPED | OUTPATIENT
Start: 2024-01-10 | End: 2024-03-07

## 2024-02-23 ENCOUNTER — LAB REQUISITION (OUTPATIENT)
Dept: LAB | Facility: CLINIC | Age: 64
End: 2024-02-23
Payer: MEDICARE

## 2024-02-23 DIAGNOSIS — Z12.4 ENCOUNTER FOR SCREENING FOR MALIGNANT NEOPLASM OF CERVIX: ICD-10-CM

## 2024-02-23 PROCEDURE — G0145 SCR C/V CYTO,THINLAYER,RESCR: HCPCS | Mod: ORL | Performed by: OBSTETRICS & GYNECOLOGY

## 2024-02-23 PROCEDURE — G0145 SCR C/V CYTO,THINLAYER,RESCR: HCPCS | Performed by: OBSTETRICS & GYNECOLOGY

## 2024-02-23 PROCEDURE — 87624 HPV HI-RISK TYP POOLED RSLT: CPT | Performed by: OBSTETRICS & GYNECOLOGY

## 2024-02-26 PROCEDURE — 87624 HPV HI-RISK TYP POOLED RSLT: CPT | Mod: ORL | Performed by: OBSTETRICS & GYNECOLOGY

## 2024-02-28 LAB
BKR LAB AP GYN ADEQUACY: NORMAL
BKR LAB AP GYN INTERPRETATION: NORMAL
BKR LAB AP HPV REFLEX: NORMAL
BKR LAB AP LMP: NORMAL
BKR LAB AP PREVIOUS ABNL DX: NORMAL
BKR LAB AP PREVIOUS ABNORMAL: NORMAL
PATH REPORT.COMMENTS IMP SPEC: NORMAL
PATH REPORT.COMMENTS IMP SPEC: NORMAL
PATH REPORT.RELEVANT HX SPEC: NORMAL

## 2024-02-29 LAB
HUMAN PAPILLOMA VIRUS 16 DNA: NEGATIVE
HUMAN PAPILLOMA VIRUS 18 DNA: NEGATIVE
HUMAN PAPILLOMA VIRUS FINAL DIAGNOSIS: NORMAL
HUMAN PAPILLOMA VIRUS OTHER HR: NEGATIVE

## 2024-03-07 DIAGNOSIS — M54.50 LUMBAR SPINE PAIN: ICD-10-CM

## 2024-03-07 NOTE — TELEPHONE ENCOUNTER
Pharmacy sent refill request for [facility] [place]  --Med last Rx 1/20/24 #30, 0 refill by [facility]   --Last OV 7/20/23 with Dr. Malagon   --Future appt: none   --Please advise

## 2024-03-08 RX ORDER — CELECOXIB 200 MG/1
CAPSULE ORAL
Qty: 30 CAPSULE | Refills: 0 | Status: SHIPPED | OUTPATIENT
Start: 2024-03-08 | End: 2024-04-02

## 2024-04-02 ENCOUNTER — MYC MEDICAL ADVICE (OUTPATIENT)
Dept: INTERNAL MEDICINE | Facility: CLINIC | Age: 64
End: 2024-04-02
Payer: MEDICARE

## 2024-04-02 ENCOUNTER — MYC REFILL (OUTPATIENT)
Dept: PHYSICAL MEDICINE AND REHAB | Facility: CLINIC | Age: 64
End: 2024-04-02
Payer: MEDICARE

## 2024-04-02 DIAGNOSIS — M54.50 LUMBAR SPINE PAIN: ICD-10-CM

## 2024-04-02 RX ORDER — CELECOXIB 200 MG/1
CAPSULE ORAL
Qty: 30 CAPSULE | Refills: 0 | Status: SHIPPED | OUTPATIENT
Start: 2024-04-02 | End: 2024-04-02

## 2024-04-02 NOTE — TELEPHONE ENCOUNTER
Please refer to patient's mychart message and advise.     Rx for Celebrex pended for 90 day if agreeable for request.

## 2024-04-03 RX ORDER — CELECOXIB 200 MG/1
CAPSULE ORAL
Qty: 90 CAPSULE | Refills: 1 | Status: SHIPPED | OUTPATIENT
Start: 2024-04-03

## 2024-04-25 ENCOUNTER — PATIENT OUTREACH (OUTPATIENT)
Dept: CARE COORDINATION | Facility: CLINIC | Age: 64
End: 2024-04-25
Payer: MEDICARE

## 2024-04-30 ENCOUNTER — PATIENT OUTREACH (OUTPATIENT)
Dept: CARE COORDINATION | Facility: CLINIC | Age: 64
End: 2024-04-30
Payer: MEDICARE

## 2024-05-03 ENCOUNTER — TELEPHONE (OUTPATIENT)
Dept: CARDIOLOGY | Facility: CLINIC | Age: 64
End: 2024-05-03
Payer: MEDICARE

## 2024-05-03 NOTE — TELEPHONE ENCOUNTER
Patient is due 7/2024 for a yearly follow up with Dr.Lin Dorothy Burns. Dr. Burns's return appointments are booking into the end of September. Patient can schedule with Alejandra Funes or with Dr. Burns. Left a voicemail stating this and to call to schedule.    YOLANDA Chen

## 2024-05-09 ENCOUNTER — PATIENT OUTREACH (OUTPATIENT)
Dept: CARE COORDINATION | Facility: CLINIC | Age: 64
End: 2024-05-09
Payer: MEDICARE

## 2024-05-14 NOTE — TELEPHONE ENCOUNTER
Called and spoke with patient to schedule Dr. Burns follow up. Dr. Burns is booking out into mid September. She can also see Alejandra Funes for follow up. Patient stated she will call back to schedule.     YOLANDA Chen

## 2024-05-28 ENCOUNTER — PATIENT OUTREACH (OUTPATIENT)
Dept: CARE COORDINATION | Facility: CLINIC | Age: 64
End: 2024-05-28
Payer: MEDICARE

## 2024-05-30 ENCOUNTER — ANCILLARY PROCEDURE (OUTPATIENT)
Dept: MAMMOGRAPHY | Facility: CLINIC | Age: 64
End: 2024-05-30
Attending: INTERNAL MEDICINE
Payer: MEDICARE

## 2024-05-30 DIAGNOSIS — Z12.31 VISIT FOR SCREENING MAMMOGRAM: ICD-10-CM

## 2024-05-30 PROCEDURE — 77063 BREAST TOMOSYNTHESIS BI: CPT

## 2024-06-12 ENCOUNTER — TRANSFERRED RECORDS (OUTPATIENT)
Dept: HEALTH INFORMATION MANAGEMENT | Facility: CLINIC | Age: 64
End: 2024-06-12
Payer: MEDICARE

## 2024-06-26 SDOH — HEALTH STABILITY: PHYSICAL HEALTH: ON AVERAGE, HOW MANY MINUTES DO YOU ENGAGE IN EXERCISE AT THIS LEVEL?: 60 MIN

## 2024-06-26 SDOH — HEALTH STABILITY: PHYSICAL HEALTH: ON AVERAGE, HOW MANY DAYS PER WEEK DO YOU ENGAGE IN MODERATE TO STRENUOUS EXERCISE (LIKE A BRISK WALK)?: 5 DAYS

## 2024-06-26 ASSESSMENT — SOCIAL DETERMINANTS OF HEALTH (SDOH): HOW OFTEN DO YOU GET TOGETHER WITH FRIENDS OR RELATIVES?: MORE THAN THREE TIMES A WEEK

## 2024-07-02 ENCOUNTER — OFFICE VISIT (OUTPATIENT)
Dept: INTERNAL MEDICINE | Facility: CLINIC | Age: 64
End: 2024-07-02
Payer: MEDICARE

## 2024-07-02 VITALS
OXYGEN SATURATION: 99 % | BODY MASS INDEX: 23.79 KG/M2 | WEIGHT: 157 LBS | SYSTOLIC BLOOD PRESSURE: 122 MMHG | RESPIRATION RATE: 12 BRPM | HEART RATE: 67 BPM | TEMPERATURE: 98.5 F | HEIGHT: 68 IN | DIASTOLIC BLOOD PRESSURE: 83 MMHG

## 2024-07-02 DIAGNOSIS — M85.80 OSTEOPENIA, UNSPECIFIED LOCATION: ICD-10-CM

## 2024-07-02 DIAGNOSIS — E89.0 POSTSURGICAL HYPOTHYROIDISM: ICD-10-CM

## 2024-07-02 DIAGNOSIS — M79.10 MYALGIA: ICD-10-CM

## 2024-07-02 DIAGNOSIS — I48.0 PAROXYSMAL ATRIAL FIBRILLATION (H): ICD-10-CM

## 2024-07-02 DIAGNOSIS — Z13.220 SCREENING FOR HYPERLIPIDEMIA: ICD-10-CM

## 2024-07-02 DIAGNOSIS — Z00.00 ANNUAL PHYSICAL EXAM: Primary | ICD-10-CM

## 2024-07-02 PROBLEM — N60.09 SOLITARY CYST OF BREAST: Status: RESOLVED | Noted: 2023-08-23 | Resolved: 2024-07-02

## 2024-07-02 PROBLEM — G25.89 SCAPULAR DYSKINESIS: Status: RESOLVED | Noted: 2019-02-13 | Resolved: 2024-07-02

## 2024-07-02 LAB
ALBUMIN SERPL BCG-MCNC: 4.5 G/DL (ref 3.5–5.2)
ALBUMIN UR-MCNC: NEGATIVE MG/DL
ALP SERPL-CCNC: 62 U/L (ref 40–150)
ALT SERPL W P-5'-P-CCNC: 24 U/L (ref 0–50)
AMORPH CRY #/AREA URNS HPF: ABNORMAL /HPF
ANION GAP SERPL CALCULATED.3IONS-SCNC: 10 MMOL/L (ref 7–15)
APPEARANCE UR: ABNORMAL
AST SERPL W P-5'-P-CCNC: 28 U/L (ref 0–45)
BACTERIA #/AREA URNS HPF: ABNORMAL /HPF
BASOPHILS # BLD AUTO: 0 10E3/UL (ref 0–0.2)
BASOPHILS NFR BLD AUTO: 0 %
BILIRUB SERPL-MCNC: 0.7 MG/DL
BILIRUB UR QL STRIP: NEGATIVE
BUN SERPL-MCNC: 17.9 MG/DL (ref 8–23)
CALCIUM SERPL-MCNC: 9.4 MG/DL (ref 8.8–10.2)
CHLORIDE SERPL-SCNC: 103 MMOL/L (ref 98–107)
COLOR UR AUTO: YELLOW
CREAT SERPL-MCNC: 0.73 MG/DL (ref 0.51–0.95)
CRP SERPL-MCNC: <3 MG/L
DEPRECATED HCO3 PLAS-SCNC: 28 MMOL/L (ref 22–29)
EGFRCR SERPLBLD CKD-EPI 2021: 90 ML/MIN/1.73M2
EOSINOPHIL # BLD AUTO: 0.1 10E3/UL (ref 0–0.7)
EOSINOPHIL NFR BLD AUTO: 3 %
ERYTHROCYTE [DISTWIDTH] IN BLOOD BY AUTOMATED COUNT: 12.3 % (ref 10–15)
GLUCOSE SERPL-MCNC: 93 MG/DL (ref 70–99)
GLUCOSE UR STRIP-MCNC: NEGATIVE MG/DL
HCT VFR BLD AUTO: 42.9 % (ref 35–47)
HGB BLD-MCNC: 13.9 G/DL (ref 11.7–15.7)
HGB UR QL STRIP: NEGATIVE
IMM GRANULOCYTES # BLD: 0 10E3/UL
IMM GRANULOCYTES NFR BLD: 0 %
KETONES UR STRIP-MCNC: NEGATIVE MG/DL
LEUKOCYTE ESTERASE UR QL STRIP: ABNORMAL
LYMPHOCYTES # BLD AUTO: 1.6 10E3/UL (ref 0.8–5.3)
LYMPHOCYTES NFR BLD AUTO: 38 %
MCH RBC QN AUTO: 31 PG (ref 26.5–33)
MCHC RBC AUTO-ENTMCNC: 32.4 G/DL (ref 31.5–36.5)
MCV RBC AUTO: 96 FL (ref 78–100)
MONOCYTES # BLD AUTO: 0.4 10E3/UL (ref 0–1.3)
MONOCYTES NFR BLD AUTO: 9 %
NEUTROPHILS # BLD AUTO: 2.2 10E3/UL (ref 1.6–8.3)
NEUTROPHILS NFR BLD AUTO: 50 %
NITRATE UR QL: NEGATIVE
PH UR STRIP: 7.5 [PH] (ref 5–8)
PLATELET # BLD AUTO: 199 10E3/UL (ref 150–450)
POTASSIUM SERPL-SCNC: 4.5 MMOL/L (ref 3.4–5.3)
PROT SERPL-MCNC: 7.2 G/DL (ref 6.4–8.3)
RBC # BLD AUTO: 4.48 10E6/UL (ref 3.8–5.2)
RBC #/AREA URNS AUTO: ABNORMAL /HPF
SODIUM SERPL-SCNC: 141 MMOL/L (ref 135–145)
SP GR UR STRIP: 1.01 (ref 1–1.03)
SQUAMOUS #/AREA URNS AUTO: ABNORMAL /LPF
TSH SERPL DL<=0.005 MIU/L-ACNC: 0.42 UIU/ML (ref 0.3–4.2)
URATE SERPL-MCNC: 3.3 MG/DL (ref 2.4–5.7)
UROBILINOGEN UR STRIP-ACNC: 0.2 E.U./DL
WBC # BLD AUTO: 4.3 10E3/UL (ref 4–11)
WBC #/AREA URNS AUTO: ABNORMAL /HPF

## 2024-07-02 PROCEDURE — 80053 COMPREHEN METABOLIC PANEL: CPT | Performed by: INTERNAL MEDICINE

## 2024-07-02 PROCEDURE — 84443 ASSAY THYROID STIM HORMONE: CPT | Performed by: INTERNAL MEDICINE

## 2024-07-02 PROCEDURE — 85025 COMPLETE CBC W/AUTO DIFF WBC: CPT | Performed by: INTERNAL MEDICINE

## 2024-07-02 PROCEDURE — 84550 ASSAY OF BLOOD/URIC ACID: CPT | Performed by: INTERNAL MEDICINE

## 2024-07-02 PROCEDURE — 36415 COLL VENOUS BLD VENIPUNCTURE: CPT | Performed by: INTERNAL MEDICINE

## 2024-07-02 PROCEDURE — G0438 PPPS, INITIAL VISIT: HCPCS | Performed by: INTERNAL MEDICINE

## 2024-07-02 PROCEDURE — 99214 OFFICE O/P EST MOD 30 MIN: CPT | Mod: 25 | Performed by: INTERNAL MEDICINE

## 2024-07-02 PROCEDURE — 86140 C-REACTIVE PROTEIN: CPT | Performed by: INTERNAL MEDICINE

## 2024-07-02 PROCEDURE — 81001 URINALYSIS AUTO W/SCOPE: CPT | Performed by: INTERNAL MEDICINE

## 2024-07-02 PROCEDURE — 87086 URINE CULTURE/COLONY COUNT: CPT | Performed by: INTERNAL MEDICINE

## 2024-07-02 RX ORDER — RESPIRATORY SYNCYTIAL VIRUS VACCINE 120MCG/0.5
0.5 KIT INTRAMUSCULAR ONCE
Qty: 1 EACH | Refills: 0 | Status: CANCELLED | OUTPATIENT
Start: 2024-07-02 | End: 2024-07-02

## 2024-07-02 NOTE — PROGRESS NOTES
Preventive Care Visit  [facility]  Chang Vora MD, Internal Medicine  Jul 2, 2024      1. Annual physical exam  This is a 64-year-old male with issues as discussed    2. Screening for hyperlipidemia  We did calculate her [place] risk to be 5%.  We discussed role of statins among other risk modifiers.  We discussed further restratification with CT coronary calcium scan.  We discussed the role of additional testing such as particle density size etc.  She is going to take omega-3/omega 6 fatty acids and follow-up with the functional medicine clinic.  If at any point she would like to proceed with a CT coronary calcium scan I will be happy to order this    3. Postsurgical hypothyroidism  - TSH WITH FREE T4 REFLEX; Future  - TSH WITH FREE T4 REFLEX    4. Osteopenia, unspecified location  Continue with adequate calcium vitamin D and weightbearing exercise    5. Paroxysmal atrial fibrillation (H)  He follows with cardiology.  She has been on metoprolol.  Given her age, she now has a slightly elevated stroke risk of 2.2 %/year.  This could be considered an indication for anticoagulation or other stroke reducing intervention.  She is going to follow-up with her cardiologist  - Comprehensive metabolic panel; Future  - CBC with platelets and differential; Future  - Comprehensive metabolic panel  - CBC with platelets and differential    6. Myalgia  She does have some general aches and pains and is on Celebrex and Cymbalta for this.  Will check labs as below.  - CRP, inflammation; Future  - Uric acid; Future  - UA Macroscopic with reflex to Microscopic and Culture - Lab Collect; Future  - CRP, inflammation  - Uric acid  - UA Macroscopic with reflex to Microscopic and Culture - Lab Collect  - Urine Microscopic Exam  - Urine Culture      Bennett Hunt is a 64 year old, presenting for the following:  Wellness Visit (64 year-old physical, test results)        7/2/2024    10:21 AM   Additional  Questions   Roomed by Salud   Accompanied by N/A         7/2/2024    10:21 AM   Patient Reported Additional Medications   Patient reports taking the following new medications Mushroom powder, goat whey, creatine, matcha powder, Caltrate, celina seed, MCT oil, collagen, LMT         Health Care Directive  Patient does not have a Health Care Directive or Living Will: Patient states has Advance Directive and will bring in a copy to clinic.    HPI  This 64-year-old woman comes in for annual visit.  She is functional medicine clinic and would like to review these reports.          6/26/2024   General Health   How would you rate your overall physical health? Good   Feel stress (tense, anxious, or unable to sleep) Not at all            6/26/2024   Nutrition   Diet: Regular (no restrictions)            6/26/2024   Exercise   Days per week of moderate/strenous exercise 5 days   Average minutes spent exercising at this level 60 min            6/26/2024   Social Factors   Frequency of gathering with friends or relatives More than three times a week   Worry food won't last until get money to buy more No   Food not last or not have enough money for food? No   Do you have housing? (Housing is defined as stable permanent housing and does not include staying ouside in a car, in a tent, in an abandoned building, in an overnight shelter, or couch-surfing.) Yes   Are you worried about losing your housing? No   Lack of transportation? No   Unable to get utilities (heat,electricity)? No            6/26/2024   Fall Risk   Fallen 2 or more times in the past year? No   Trouble with walking or balance? No             6/26/2024   Activities of Daily Living- Home Safety   Needs help with the following daily activites None of the above   Safety concerns in the home None of the above            6/26/2024   Dental   Dentist two times every year? Yes            6/26/2024   Hearing Screening   Hearing concerns? None of the above            6/26/2024    Driving Risk Screening   Patient/family members have concerns about driving No            2024   General Alertness/Fatigue Screening   Have you been more tired than usual lately? No            2024   Urinary Incontinence Screening   Bothered by leaking urine in past 6 months No            2024   TB Screening   Were you born outside of the [place]? No            Today's PHQ-2 Score:       2024     6:17 PM   PHQ-2 (  [employer])   Q1: Little interest or pleasure in doing things 0   Q2: Feeling down, depressed or hopeless 0   PHQ-2 Score 0   Q1: Little interest or pleasure in doing things Not at all   Q2: Feeling down, depressed or hopeless Not at all   PHQ-2 Score 0           2024   Substance Use   Alcohol more than 3/day or more than 7/wk No   Do you have a current opioid prescription? No   How severe/bad is pain from 1 to 10? 4/10   Do you use any other substances recreationally? No        Social History     Tobacco Use    Smoking status: Former     Current packs/day: 0.00     Types: Cigarettes     Quit date: 1984     Years since quittin.5    Smokeless tobacco: Never   Vaping Use    Vaping status: Never Used   Substance Use Topics    Alcohol use: Yes     Alcohol/week: 7.0 standard drinks of alcohol     Comment: Occ.    Drug use: No           2024   LAST FHS-7 RESULTS   1st degree relative breast or ovarian cancer No   Any relative bilateral breast cancer No   Any male have breast cancer No   Any ONE woman have BOTH breast AND ovarian cancer No   Any woman with breast cancer before 50yrs No   2 or more relatives with breast AND/OR ovarian cancer No   2 or more relatives with breast AND/OR bowel cancer No               History of abnormal Pap smear:         Latest Ref Rng & Units 2024    11:50 AM 2023    12:07 PM   PAP / HPV   PAP  Negative for Intraepithelial Lesion or Malignancy (NILM)  Atypical squamous cells of undetermined significance (ASC-US)    HPV 16 DNA Negative  Negative  Negative    HPV 18 DNA Negative Negative  Negative    Other HR HPV Negative Negative  Negative      ASCVD Risk   The 10-year ASCVD risk score (Yahir MARCH, et al., 2019) is: 4.8%    Values used to calculate the score:      Age: 64 years      Sex: Female      Is Non- : No      Diabetic: No      Tobacco smoker: No      Systolic Blood Pressure: 122 mmHg      Is BP treated: No      HDL Cholesterol: 81 mg/dL      Total Cholesterol: 192 mg/dL          Reviewed and updated as needed this visit by Provider                    Current providers sharing in care for this patient include:  Patient Care Team:  Libby Castorena MD as PCP - General (OB/Gyn)  Iesha Vasquez MD as PCP - gastroentrology (Gastroenterology)  Mayra Burns MD as MD (Cardiology)  Kelley Petit RN as Specialty Care Coordinator (Cardiology)  Chang Vora MD as Assigned PCP  David Malagon DO as Assigned Neuroscience Provider  Libby Castorena MD as MD (OB/Gyn)  Mayra Burns MD as Assigned Heart and Vascular Provider  Keshawn Pablo MD as Assigned Surgical Provider    The following health maintenance items are reviewed in Epic and correct as of today:  Health Maintenance   Topic Date Due    DTAP/TDAP/TD IMMUNIZATION (1 - Tdap) Never done    RSV VACCINE (Pregnancy & 60+) (1 - 1-dose 60+ series) Never done    COVID-19 Vaccine (6 - 2023-24 season) 04/04/2024    TSH W/FREE T4 REFLEX  05/25/2024    ANNUAL REVIEW OF HM ORDERS  05/25/2024    INFLUENZA VACCINE (1) 09/01/2024    MEDICARE ANNUAL WELLNESS VISIT  02/23/2025    MAMMO SCREENING  05/30/2025    FALL RISK ASSESSMENT  07/02/2025    GLUCOSE  05/25/2026    LIPID  05/25/2028    ADVANCE CARE PLANNING  05/25/2028    COLORECTAL CANCER SCREENING  06/12/2029    DEXA  05/30/2038    PHQ-2 (once per calendar year)  Completed    Pneumococcal Vaccine: 65+ Years  Completed    ZOSTER IMMUNIZATION  Completed    IPV IMMUNIZATION  Aged Out    HPV  "IMMUNIZATION  Aged Out    MENINGITIS IMMUNIZATION  Aged Out    RSV MONOCLONAL ANTIBODY  Aged Out    HEPATITIS C SCREENING  Discontinued    PAP  Discontinued            Objective    Exam  /83 (BP Location: Left arm, Patient Position: Sitting, Cuff Size: Adult Regular)   Pulse 67   Temp 98.5  F (36.9  C)   Resp 12   Ht 1.727 m (5' 8\")   Wt 71.2 kg (157 lb)   LMP 07/06/2008 (Approximate)   SpO2 99%   Breastfeeding No   BMI 23.87 kg/m     Estimated body mass index is 23.87 kg/m  as calculated from the following:    Height as of this encounter: 1.727 m (5' 8\").    Weight as of this encounter: 71.2 kg (157 lb).    Physical Exam  EYES: Eyelids, conjunctiva, and sclera were normal. Pupils were normal. Cornea, iris, and lens were normal bilaterally.  HEAD, EARS, NOSE, MOUTH, AND THROAT: Head and face were normal. Hearing was normal to voice and the ears were normal to external exam. Nose appearance was normal and there was no discharge.   NECK: Neck appearance was normal.   RESPIRATORY: Breathing pattern was normal and the chest moved symmetrically.    CARDIOVASCULAR: There was no peripheral edema.  GASTROINTESTINAL: The abdomen was normal in contour.   NEUROLOGIC: The patient was alert and oriented to person, place, time, and circumstance. Speech was normal. Cranial nerves were normal. Motor strength was normal for age. The patient was normally coordinated.  PSYCHIATRIC:  Mood and affect were normal and the patient had normal recent and remote memory. The patient's judgment and insight were normal.        7/2/2024   Mini Cog   Clock Draw Score 2 Normal   3 Item Recall 3 objects recalled   Mini Cog Total Score 5                 Signed Electronically by: Chang Vora MD    "

## 2024-07-02 NOTE — PATIENT INSTRUCTIONS
"Patient Education   Preventive Care Advice   This is general advice we often give to help people stay healthy. Your care team may have specific advice just for you. Please talk to your care team about your own preventive care needs.  Lifestyle  Exercise at least 150 minutes each week (30 minutes a day, 5 days a week).  Do muscle strengthening activities 2 days a week. These help control your weight and prevent disease.  No smoking.  Wear sunscreen to prevent skin cancer.  Have your home tested for radon every 2 to 5 years. Radon is a colorless, odorless gas that can harm your lungs. To learn more, go to www.health.[facility].[place].us and search for \"Radon in Homes.\"  Keep guns unloaded and locked up in a safe place like a safe or gun vault, or, use a gun lock and hide the keys. Always lock away bullets separately. To learn more, visit [employer].[place].gov and search for \"safe gun storage.\"  Nutrition  Eat 5 or more servings of fruits and vegetables each day.  Try wheat bread, brown rice and whole grain pasta (instead of white bread, rice, and pasta).  Get enough calcium and vitamin D. Check the label on foods and aim for 100% of the RDA (recommended daily allowance).  Regular exams  Have a dental exam and cleaning every 6 months.  See your health care team every year to talk about:  Any changes in your health.  Any medicines your care team has prescribed.  Preventive care, family planning, and ways to prevent chronic diseases.  Shots (vaccines)   HPV shots (up to age 26), if you've never had them before.  Hepatitis B shots (up to age 59), if you've never had them before.  COVID-19 shot: Get this shot when it's due.  Flu shot: Get a flu shot every year.  Tetanus shot: Get a tetanus shot every 10 years.  Pneumococcal, hepatitis A, and RSV shots: Ask your care team if you need these based on your risk.  Shingles shot (for age 50 and up).  General health tests  Diabetes screening:  Starting at age 35, Get screened for diabetes at least " every 3 years.  If you are younger than age 35, ask your care team if you should be screened for diabetes.  Cholesterol test: At age 39, start having a cholesterol test every 5 years, or more often if advised.  Bone density scan (DEXA): At age 50, ask your care team if you should have this scan for osteoporosis (brittle bones).  Hepatitis C: Get tested at least once in your life.  Abdominal aortic aneurysm screening: Talk to your doctor about having this screening if you:  Have ever smoked; and  Are biologically male; and  Are between the ages of 65 and 75.  STIs (sexually transmitted infections)  Before age 24: Ask your care team if you should be screened for STIs.  After age 24: Get screened for STIs if you're at risk. You are at risk for STIs (including HIV) if:  You are sexually active with more than one person.  You don't use condoms every time.  You or a partner was diagnosed with a sexually transmitted infection.  If you are at risk for HIV, ask about PrEP medicine to prevent HIV.  Get tested for HIV at least once in your life, whether you are at risk for HIV or not.  Cancer screening tests  Cervical cancer screening: If you have a cervix, begin getting regular cervical cancer screening tests at age 21. Most people who have regular screenings with normal results can stop after age 65. Talk about this with your provider.  Breast cancer scan (mammogram): If you've ever had breasts, begin having regular mammograms starting at age 40. This is a scan to check for breast cancer.  Colon cancer screening: It is important to start screening for colon cancer at age 45.  Have a colonoscopy test every 10 years (or more often if you're at risk) Or, ask your provider about stool tests like a FIT test every year or Cologuard test every 3 years.  To learn more about your testing options, visit: www.[employer]/435595.pdf.  For help making a decision, visit: marla/ll93933.  Prostate cancer screening test: If you have a  prostate and are age 55 to 69, ask your provider if you would benefit from a yearly prostate cancer screening test.  Lung cancer screening: If you are a current or former smoker age 50 to 80, ask your care team if ongoing lung cancer screenings are right for you.  For informational purposes only. Not to replace the advice of your health care provider. Copyright   2023 [facility]. All rights reserved. Clinically reviewed by the [facility]. FÃƒÂ©vrier 46 [id number] - REV 04/24.

## 2024-07-03 LAB — BACTERIA UR CULT: NORMAL

## 2024-07-09 ENCOUNTER — E-VISIT (OUTPATIENT)
Dept: INTERNAL MEDICINE | Facility: CLINIC | Age: 64
End: 2024-07-09
Payer: MEDICARE

## 2024-07-09 DIAGNOSIS — N30.90 BLADDER INFECTION: Primary | ICD-10-CM

## 2024-07-09 PROCEDURE — 99207 PR NON-BILLABLE SERV PER CHARTING: CPT | Performed by: INTERNAL MEDICINE

## 2024-07-09 RX ORDER — SULFAMETHOXAZOLE/TRIMETHOPRIM 800-160 MG
1 TABLET ORAL 2 TIMES DAILY
Qty: 6 TABLET | Refills: 0 | Status: SHIPPED | OUTPATIENT
Start: 2024-07-09 | End: 2024-07-12

## 2024-07-20 ENCOUNTER — MYC MEDICAL ADVICE (OUTPATIENT)
Dept: INTERNAL MEDICINE | Facility: CLINIC | Age: 64
End: 2024-07-20
Payer: MEDICARE

## 2024-07-22 ENCOUNTER — NURSE TRIAGE (OUTPATIENT)
Dept: INTERNAL MEDICINE | Facility: CLINIC | Age: 64
End: 2024-07-22
Payer: MEDICARE

## 2024-07-22 DIAGNOSIS — U07.1 INFECTION DUE TO 2019 NOVEL CORONAVIRUS: Primary | ICD-10-CM

## 2024-07-22 NOTE — TELEPHONE ENCOUNTER
Nurse Triage SBAR    Is this a 2nd Level Triage? YES, LICENSED PRACTITIONER REVIEW IS REQUIRED    Situation: Patient calls asking for a paxlovid RX because she tested positive     Background: 64 year old female who has had covid with paxlovid tx in the past.     Assessment: Symptoms started on 7/19/24. Patient reports fatigue, headache, sore throat, runny nose and chills.     Protocol Recommended Disposition:   Discuss With PCP And Callback By Nurse Within 1 Hour    Recommendation:  RN covid protocol complete.     Routed to provider    Does the patient meet one of the following criteria for ADS visit consideration? 16+ years old, with an [facility] PCP     TIP  Providers, please consider if this condition is appropriate for management at one of our Acute and Diagnostic Services sites.     If patient is a good candidate, please use dotphrase <dot>triageresponse and select Refer to ADS to document.       RN COVID TREATMENT VISIT  07/22/24      The patient has been triaged and does not require a higher level of care.    Marilee Weston  64 year old  Current weight? 155    Has the patient been seen by a primary care provider at an [facility] or [facility] within the past two years? Yes.   Have you been in close proximity to/do you have a known exposure to a person with a confirmed case of influenza? No.     General treatment eligibility:  Date of positive COVID test (PCR or at home)?  7/20/2024    Are you or have you been hospitalized for this COVID-19 infection? No.   Have you received monoclonal antibodies or antiviral treatment for COVID-19 since this positive test? No.   Do you have any of the following conditions that place you at risk of being very sick from COVID-19?   Yes, patient has at least one high risk condition as noted above.     Current COVID symptoms:   Yes. Patient has at least one symptom as selected.     How many days since symptoms started? 5 days or less. Established patient,  12 years or older weighing at least 88.2 lbs, who has symptoms that started in the past 5 days, has not been hospitalized nor received treatment already, and is at risk for being very sick from COVID-19.     Treatment eligibility by RN:  Are you currently pregnant or nursing? No  Do you have a clinically significant hypersensitivity to nirmatrelvir or ritonavir, or toxic epidermal necrolysis (TEN) or Turner-Anjum Syndrome? No  Do you have a history of hepatitis, any hepatic impairment on the Problem List (such as Child-Murguia Class C, cirrhosis, fatty liver disease, alcoholic liver disease), or was the last liver lab (hepatic panel, ALT, AST, ALK Phos, bilirubin) elevated in the past 6 months? No  Do you have any history of severe renal impairment (eGFR < 30mL/min)? No    Is patient eligible to continue? Yes, patient meets all eligibility requirements for the RN COVID treatment (as denoted by all no responses above).     Current Outpatient Medications   Medication Sig Dispense Refill    calcium carbonate 600 mg-vitamin D 400 units (CALTRATE) 600-400 MG-UNIT per tablet Take 1 tablet by mouth 2 times daily      celecoxib (CELEBREX) 200 MG capsule TAKE 1 CAPSULE(200 MG) BY MOUTH DAILY Strength: 200 mg 90 capsule 1    DULoxetine (CYMBALTA) 30 MG capsule Take 1 capsule (30 mg) by mouth daily 90 capsule 4    estradiol (ESTRACE) 0.1 MG/GM vaginal cream       estradiol (VAGIFEM) 10 MCG TABS vaginal tablet Place 10 mcg vaginally daily      fish oil-omega-3 fatty acids 1000 MG capsule Take 1 g by mouth 2 times daily.      Flaxseed, Linseed, (FLAXSEED OIL PO)       levothyroxine (SYNTHROID) 100 MCG tablet Take 1 tablet (100 mcg) by mouth daily 90 tablet 4    metoprolol succinate ER (TOPROL XL) 50 MG 24 hr tablet Take 1.5 tablets (75 mg) by mouth daily 135 tablet 4    metoprolol tartrate (LOPRESSOR) 25 MG tablet Take 1 tablet (25 mg) by mouth as needed (Take 25mg for AF episodes, if persists after 1-2 hours, take another  25mg.) 60 tablet 0    polyethylene glycol (MIRALAX) 17 GM/Dose powder       TYRVAYA 0.03 MG/ACT nasal spray Spray 1 spray into both nostrils 2 times daily      XIIDRA 5 % opthalmic solution INSTILL 1 DROP IN BOTH EYES TWICE DAILY 12 HOURS APART         Medications from List 1 of the standing order (on medications that exclude the use of Paxlovid) that patient is taking: NONE. Is patient taking Alsip's Wort? No  Is patient taking Alsip's Wort or any meds from List 1? No.   Medications from List 2 of the standing order (on meds that provider needs to adjust) that patient is taking: NONE. Is patient on any of the meds from List 2? No.   Medications from List 3 of standing order (on meds that a RN needs to adjust) that patient is taking: NONE. Is patient on any meds from List 3? No.     Paxlovid has an approximate 90% reduction in hospitalization. Paxlovid can possibly cause altered sense of taste, diarrhea (loose, watery stools), high blood pressure, muscle aches.     Would patient like a Paxlovid prescription?   Yes.   Lab Results   Component Value Date    GFRESTIMATED 90 07/02/2024       Was last eGFR reduced? No, eGFR 60 or greater/ No Result on record. Patient can receive the normal renal function dose. Paxlovid Rx sent to [place] pharmacy   [facility].     Temporary change to home medications: None    All medication adjustments (holds, etc) were discussed with the patient and patient was asked to repeat back (teachback) their med adjustment.  Did patient understand med adjustment? Yes, patient repeated back and understood correctly.        Reviewed the following instructions with the patient:    Paxlovid (nimatrelvir and ritonavir)    How it works  Two medicines (nirmatrelvir and ritonavir) are taken together. They stop the virus from growing. Less amount of virus is easier for your body to fight.    How to take  Medicine comes in a daily container with both medicine tablets. Take by mouth twice  daily (once in the morning, once at night) for 5 days.  The number of tablets to take varies by patient.  Don't chew or break capsules. Swallow whole.    When to take  Take as soon as possible after positive COVID-19 test result, and within 5 days of your first symptoms.    Possible side effects  Can cause altered sense of taste, diarrhea (loose, watery stools), high blood pressure, muscle aches.    Marce Escalante RN        Reason for Disposition   HIGH RISK patient (e.g., weak immune system, age > 64 years, obesity with BMI of 30 or higher, pregnant, chronic lung disease or other chronic medical condition) and COVID symptoms (e.g., cough, fever)  (Exceptions: Already seen by doctor or NP/PA and no new or worsening symptoms.)    Additional Information   Negative: SEVERE difficulty breathing (e.g., struggling for each breath, speaks in single words)   Negative: Difficult to awaken or acting confused (e.g., disoriented, slurred speech)   Negative: Bluish (or gray) lips or face now   Negative: Shock suspected (e.g., cold/pale/clammy skin, too weak to stand, low BP, rapid pulse)   Negative: Sounds like a life-threatening emergency to the triager   Negative: Diagnosed or suspected COVID-19 and symptoms lasting 3 or more weeks   Negative: COVID-19 exposure and no symptoms   Negative: COVID-19 vaccine reaction suspected (e.g., fever, headache, muscle aches) occurring 1 to 3 days after getting vaccine   Negative: COVID-19 vaccine, questions about   Negative: Lives with someone known to have influenza (flu test positive) and flu-like symptoms (e.g., cough, runny nose, sore throat, SOB; with or without fever)   Negative: Possible COVID-19 symptoms and triager concerned about severity of symptoms or other causes   Negative: COVID-19 and breastfeeding, questions about   Negative: SEVERE or constant chest pain or pressure  (Exception: Mild central chest pain, present only when coughing.)   Negative: MODERATE difficulty  "breathing (e.g., speaks in phrases, SOB even at rest, pulse 100-120)   Negative: Headache and stiff neck (can't touch chin to chest)   Negative: Oxygen level (e.g., pulse oximetry) 90% or lower   Negative: Chest pain or pressure  (Exception: MILD central chest pain, present only when coughing.)   Negative: Drinking very little and dehydration suspected (e.g., no urine > 12 hours, very dry mouth, very lightheaded)   Negative: Patient sounds very sick or weak to the triager   Negative: MILD difficulty breathing (e.g., minimal/no SOB at rest, SOB with walking, pulse <100)   Negative: Fever > 103 F (39.4 C)   Negative: Fever > 101 F (38.3 C) and over 60 years of age   Negative: Fever > 100.0 F (37.8 C) and bedridden (e.g., CVA, chronic illness, recovering from surgery)    Answer Assessment - Initial Assessment Questions  1. COVID-19 DIAGNOSIS: \"How do you know that you have COVID?\" (e.g., positive lab test or self-test, diagnosed by doctor or NP/PA, symptoms after exposure).      Saturday monring.   2. COVID-19 EXPOSURE: \"Was there any known exposure to COVID before the symptoms began?\" CDC Definition of close contact: within 6 feet (2 meters) for a total of 15 minutes or more over a 24-hour period.       I don't know.   3. ONSET: \"When did the COVID-19 symptoms start?\"       Friday night.   4. WORST SYMPTOM: \"What is your worst symptom?\" (e.g., cough, fever, shortness of breath, muscle aches)      Fatigue, headache, back  5. COUGH: \"Do you have a cough?\" If Yes, ask: \"How bad is the cough?\"        Yes- dry cough.   6. FEVER: \"Do you have a fever?\" If Yes, ask: \"What is your temperature, how was it measured, and when did it start?\"      It feels like I have one but I haven't checked.   7. RESPIRATORY STATUS: \"Describe your breathing?\" (e.g., normal; shortness of breath, wheezing, unable to speak)       Normal. Denies shortness of breath  8. BETTER-SAME-WORSE: \"Are you getting better, staying the same or getting worse " "compared to yesterday?\"  If getting worse, ask, \"In what way?\"      worse  9. OTHER SYMPTOMS: \"Do you have any other symptoms?\"  (e.g., chills, fatigue, headache, loss of smell or taste, muscle pain, sore throat)      Sore throat, my eyes are sore.   10. HIGH RISK DISEASE: \"Do you have any chronic medical problems?\" (e.g., asthma, heart or lung disease, weak immune system, obesity, etc.)        Atrial fibrillation  11. VACCINE: \"Have you had the COVID-19 vaccine?\" If Yes, ask: \"Which one, how many shots, when did you get it?\"        yes  12. PREGNANCY: \"Is there any chance you are pregnant?\" \"When was your last menstrual period?\"        no  13. O2 SATURATION MONITOR:  \"Do you use an oxygen saturation monitor (pulse oximeter) at home?\" If Yes, ask \"What is your reading (oxygen level) today?\" \"What is your usual oxygen saturation reading?\" (e.g., 95%)       I dont know where it is.    Protocols used: Coronavirus (COVID-19) Diagnosed or [id number]    "

## 2024-07-25 NOTE — TELEPHONE ENCOUNTER
Spoke with patient and scheduled virtual visit per her request. She states she is feeling better today and does not need to come in today but does not want to get long Covid.

## 2024-07-25 NOTE — TELEPHONE ENCOUNTER
If she can be fit into my schedule to be happy to see her, otherwise maybe she could see Isabel or she could come in tomorrow

## 2024-07-26 ENCOUNTER — VIRTUAL VISIT (OUTPATIENT)
Dept: INTERNAL MEDICINE | Facility: CLINIC | Age: 64
End: 2024-07-26
Payer: MEDICARE

## 2024-07-26 ENCOUNTER — TELEPHONE (OUTPATIENT)
Dept: INTERNAL MEDICINE | Facility: CLINIC | Age: 64
End: 2024-07-26

## 2024-07-26 DIAGNOSIS — U07.1 INFECTION DUE TO 2019 NOVEL CORONAVIRUS: Primary | ICD-10-CM

## 2024-07-26 DIAGNOSIS — I48.0 PAROXYSMAL ATRIAL FIBRILLATION (H): ICD-10-CM

## 2024-07-26 DIAGNOSIS — U07.1 INFECTION DUE TO 2019 NOVEL CORONAVIRUS: ICD-10-CM

## 2024-07-26 DIAGNOSIS — J20.9 ACUTE BRONCHITIS, UNSPECIFIED ORGANISM: ICD-10-CM

## 2024-07-26 DIAGNOSIS — J20.9 ACUTE BRONCHITIS, UNSPECIFIED ORGANISM: Primary | ICD-10-CM

## 2024-07-26 PROCEDURE — 99214 OFFICE O/P EST MOD 30 MIN: CPT | Mod: 95 | Performed by: INTERNAL MEDICINE

## 2024-07-26 PROCEDURE — G2211 COMPLEX E/M VISIT ADD ON: HCPCS | Mod: 95 | Performed by: INTERNAL MEDICINE

## 2024-07-26 RX ORDER — BUDESONIDE AND FORMOTEROL FUMARATE DIHYDRATE 80; 4.5 UG/1; UG/1
2 AEROSOL RESPIRATORY (INHALATION) 2 TIMES DAILY
Qty: 6.9 G | Refills: 0 | Status: SHIPPED | OUTPATIENT
Start: 2024-07-26 | End: 2024-07-26

## 2024-07-26 RX ORDER — METOPROLOL SUCCINATE 50 MG/1
75 TABLET, EXTENDED RELEASE ORAL DAILY
Qty: 135 TABLET | Refills: 4 | Status: SHIPPED | OUTPATIENT
Start: 2024-07-26

## 2024-07-26 NOTE — TELEPHONE ENCOUNTER
Pharmacy is faxing and stating needing a drug change due to insurance    Alternatives are Advair HFA, Breoellipta or Dulera-please send in alternative if appropriate.    This is not covered:   Disp Refills Start End RENEE   budesonide-formoterol (SYMBICORT) 80-4.5 MCG/ACT Inhaler 6.9 g 0 7/26/2024 -- No   Sig - Route: Inhale 2 puffs into the lungs 2 times daily - Inhalation   Sent to pharmacy as: Budesonide-Formoterol Fumarate 80-4.5 MCG/ACT Inhalation Aerosol (SYMBICORT)   Class: E-Prescribe   Order: [id number]   E-Prescribing Status: Receipt confirmed by pharmacy (7/26/2024 11:13 AM CDT)

## 2024-07-26 NOTE — PROGRESS NOTES
Marilee is a 64 year old who is being evaluated via a billable video visit.    How would you like to obtain your AVS? MyChart  If the video visit is dropped, the invitation should be resent by: Text to cell phone: [phone]  Will anyone else be joining your video visit?       1. Infection due to 2019 novel coronavirus  Symptoms started evening of July 19, finishing Paxlovid, continues to improve symptomatically, cough better but still present, productive of inflammatory sputum, oxygen saturation okay no shortness of breath, fever resolved.  Start Symbicort to help with bronchitis type symptoms  - budesonide-formoterol (SYMBICORT) 80-4.5 MCG/ACT Inhaler; Inhale 2 puffs into the lungs 2 times daily  Dispense: 6.9 g; Refill: 0    2. Acute bronchitis, unspecified organism  - budesonide-formoterol (SYMBICORT) 80-4.5 MCG/ACT Inhaler; Inhale 2 puffs into the lungs 2 times daily  Dispense: 6.9 g; Refill: 0    3. Paroxysmal atrial fibrillation (H)  She is wondering if she should continue to see cardiology.  I discussed with her that her stroke risk based on her age is increasing and consideration should be given to stroke prevention.  It would be my recommendation that she start Eliquis and she can discuss this with Dr. Alanis for [facility]  - metoprolol succinate ER (TOPROL XL) 50 MG 24 hr tablet; Take 1.5 tablets (75 mg) by mouth daily  Dispense: 135 tablet; Refill: 4  - Adult Cardiology Eval [facility] Referral; Future    The longitudinal plan of care for the diagnosis(es)/condition(s) as documented were addressed during this visit. Due to the added complexity in care, I will continue to support Marilee in the subsequent management and with ongoing continuity of care.      Subjective   Marilee is a 64 year old, presenting for the following health issues:  Office Visit (Pt is still coughing after taking Paxlovid. Pt concerned about the lingering cough, nasal drainage, and respiratory issues.) and Recheck Medication      7/26/2024     10:12 AM   Additional Questions   Roomed by juliet raymond   Accompanied by jeovany     History of Present Illness       Reason for visit:  Covid 19    She eats 2-3 servings of fruits and vegetables daily.She consumes 0 sweetened beverage(s) daily.She exercises with enough effort to increase her heart rate 30 to 60 minutes per day.  She exercises with enough effort to increase her heart rate 5 days per week.   She is taking medications regularly.         Objective    Vitals - Patient Reported  SpO2 (Patient Reported): 99    Physical Exam           Video-Visit Details    Type of service:  Video Visit   Originating Location (pt. Location): Home    Distant Location (provider location):  On-site  Platform used for Video Visit: Guillaume  Signed Electronically by: Chang Vora MD

## 2024-08-09 DIAGNOSIS — M43.16 SPONDYLOLISTHESIS OF LUMBAR REGION: ICD-10-CM

## 2024-08-12 RX ORDER — DULOXETIN HYDROCHLORIDE 30 MG/1
30 CAPSULE, DELAYED RELEASE ORAL DAILY
Qty: 90 CAPSULE | Refills: 0 | Status: SHIPPED | OUTPATIENT
Start: 2024-08-12

## 2024-08-20 ENCOUNTER — OFFICE VISIT (OUTPATIENT)
Dept: CARDIOLOGY | Facility: CLINIC | Age: 64
End: 2024-08-20
Attending: INTERNAL MEDICINE
Payer: MEDICARE

## 2024-08-20 VITALS
DIASTOLIC BLOOD PRESSURE: 80 MMHG | HEIGHT: 68 IN | RESPIRATION RATE: 16 BRPM | HEART RATE: 55 BPM | SYSTOLIC BLOOD PRESSURE: 110 MMHG | WEIGHT: 160 LBS | BODY MASS INDEX: 24.25 KG/M2

## 2024-08-20 DIAGNOSIS — I48.0 PAROXYSMAL ATRIAL FIBRILLATION (H): Primary | ICD-10-CM

## 2024-08-20 DIAGNOSIS — E89.0 POSTSURGICAL HYPOTHYROIDISM: ICD-10-CM

## 2024-08-20 PROCEDURE — 99204 OFFICE O/P NEW MOD 45 MIN: CPT | Performed by: INTERNAL MEDICINE

## 2024-08-20 PROCEDURE — G2211 COMPLEX E/M VISIT ADD ON: HCPCS | Performed by: INTERNAL MEDICINE

## 2024-08-20 NOTE — LETTER
8/20/2024    Chang Vora MD  [address] [address]    RE: Marilee Weston       Dear Colleague,     I had the pleasure of seeing Marilee Weston in the [facility].    [facility] Office Consult     Assessment:   (I48.0) Paroxysmal atrial fibrillation (H)  (primary encounter diagnosis)  Comment: Symptomatic, not valvula, short bursts.  For the most part controlled metoprolol.  Her [id number] or is now 2, we will give her Eliquis 5 lisinopril twice daily to take at the onset of atrial fibrillation since it is very symptomatic.  Will arrange for 2-week event monitor to see if she is having asymptomatic spells.  Lastly we will go ahead and get echocardiogram look for structural heart disease.  Suspect she will be needed ablation at sometime in the future and at this point she would rather wait and I concur.  Electrolytes were unremarkable as was thyroid, she knows to try to limit her caffeine intake, she drinks rare alcohol, she does not use nicotine and she does not snore.    (E89.0) Postsurgical hypothyroidism  Comment: So noted on supplement with good TSH.     Plan:   1.  Decrease caffeine if possible.  2.  Decrease chocolate if possible.  3.  Arrange for echocardiogram look for structural heart disease.  4.  2-week event monitor to look for asymptomatic atrial fibrillation.  5.  Eliquis 5 mg by mouth twice daily to take when she goes into A-fib that does not go away.  6.  Continue as needed metoprolol.  7.  Contact me if any issues so we can talk about needing to schedule ablation.  8.  Follow-up with me in 1 year or sooner if needed.    History of Present Illness:   Thank you for asking the [facility] team to see Marilee Weston a 64 year old female  in consultation  to evaluate atrial fibrillation.   Patient has been in her usual state of health till about 10 or 15 or so years ago when she went to get a calcium score is noted to have score of 0.   She then mentioned that she was having some palpitations and she wore heart monitor was noted to have 3% atrial fibrillation.  She states roughly about twice a year, for maybe an hour to 2 hours at a time, she will feel her heart racing internally with a fullness in her neck.  There is no associated shortness of breath or lightheadedness with this.  She tells me that the can come on after eating excessive amount of chocolate chips which occurred just approximately 1 week or so ago.    Past Medical History:     Past Medical History:   Diagnosis Date     Depression      Fibrocystic breast      Osteopenia      Paroxysmal atrial fibrillation (H)      Postsurgical hypothyroidism      Past history is negative for cancer, tuberculosis, diabetes mellitus, myocardial infarction,  rheumatic fever, hypertension, cerebrovascular accident, chronic kidney disease, peptic ulcer disease, chronic obstructive pulmonary disease, and no lipid disorder.     Past Surgical History:     Past Surgical History:   Procedure Laterality Date     BIOPSY BREAST Right     clogged duct     PILONIDAL CYST / SINUS EXCISION  1979     STRIP VEIN  1975, 1986     THYROIDECTOMY  1985    benign tumor     THYROIDECTOMY   1986    Partial for benign thyroid tumor     VEIN LIGATION AND STRIPPING Right 1975    x2     VEIN SURGERY  2015     Family History:   Family history negative for coronary artery disease.    Social History:   She lives independently with her , works managing real estate, reports that she quit smoking about 40 years ago. Her smoking use included cigarettes. She has never used smokeless tobacco. She reports current alcohol use of about 7.0 standard drinks of alcohol per week. She reports that she does not use drugs.  She drinks 2-3 caffeinated beverages a day. The primary care physician is Chang Vora    Meds:   Scheduled Meds:  Current Outpatient Medications   Medication Sig Dispense Refill     apixaban ANTICOAGULANT (ELIQUIS) 5  "MG tablet Take 1 tablet (5 mg) by mouth 2 times daily 40 tablet 2     calcium carbonate (OS-AGUSTINA) 1500 (600 Ca) MG tablet        calcium carbonate 600 mg-vitamin D 400 units (CALTRATE) 600-400 MG-UNIT per tablet Take 1 tablet by mouth 2 times daily       celecoxib (CELEBREX) 200 MG capsule TAKE 1 CAPSULE(200 MG) BY MOUTH DAILY Strength: 200 mg 90 capsule 1     DULoxetine (CYMBALTA) 30 MG capsule Take 1 capsule (30 mg) by mouth daily 90 capsule 0     estradiol (ESTRACE) 0.1 MG/GM vaginal cream        estradiol (VAGIFEM) 10 MCG TABS vaginal tablet Place 10 mcg vaginally daily       fish oil-omega-3 fatty acids 1000 MG capsule Take 1 g by mouth 2 times daily.       Flaxseed, Linseed, (FLAXSEED OIL PO)        levothyroxine (SYNTHROID) 100 MCG tablet Take 1 tablet (100 mcg) by mouth daily 90 tablet 4     metoprolol succinate ER (TOPROL XL) 50 MG 24 hr tablet Take 1.5 tablets (75 mg) by mouth daily 135 tablet 4     metoprolol tartrate (LOPRESSOR) 25 MG tablet Take 1 tablet (25 mg) by mouth as needed (Take 25mg for AF episodes, if persists after 1-2 hours, take another 25mg.) 60 tablet 0     mometasone-formoterol (DULERA) 100-5 MCG/ACT inhaler Inhale 2 puffs into the lungs 2 times daily 8.8 g 0     polyethylene glycol (MIRALAX) 17 GM/Dose powder        TYRVAYA 0.03 MG/ACT nasal spray Spray 1 spray into both nostrils 2 times daily       XIIDRA 5 % opthalmic solution INSTILL 1 DROP IN BOTH EYES TWICE DAILY 12 HOURS APART         PRN Meds:.  No current facility-administered medications for this visit.       Allergies:   Penicillins    Objective:      Physical Exam  72.6 kg (160 lb)  1.727 m (5' 8\")  Body mass index is 24.33 kg/m .  /80 (BP Location: Left arm, Patient Position: Sitting, Cuff Size: Adult Regular)   Pulse 55   Resp 16   Ht 1.727 m (5' 8\")   Wt 72.6 kg (160 lb)   LMP 07/06/2008 (Approximate)   BMI 24.33 kg/m      General Appearance:   Alert, cooperative and in no acute distress.   HEENT:  No scleral " "icterus; the mucous membranes were pink and moist.   Neck: JVP normal. No thyromegaly. No HJR   Chest: The spine was straight. The chest was symmetric.   Lungs:   Respirations unlabored; the lungs are clear to auscultation.   Cardiovascular:   S1 and S2 without murmur, clicks or rubs. Brachial, radial, carotid and posterior tibial pulses are intact and symetrical.  No carotid bruits noted   Abdomen:  No organomegaly, masses, bruits, or tenderness. Bowels sounds are present   Extremities: No cyanosis, clubbing, or edema.   Skin: No xanthelasma.   Neurologic: Mood and affect are appropriate.         Lab Reviewed Personally by myself  Lab Results   Component Value Date     07/02/2024     01/15/2018    CO2 28 07/02/2024    CO2 25 06/14/2021    CO2 31 01/15/2018    BUN 17.9 07/02/2024    BUN 18 06/14/2021    BUN 16 01/15/2018     Lab Results   Component Value Date    WBC 4.3 07/02/2024    WBC 5.2 01/15/2018    HGB 13.9 07/02/2024    HGB 13.4 01/15/2018    HCT 42.9 07/02/2024    HCT 41.5 01/15/2018    MCV 96 07/02/2024    MCV 97 01/15/2018     07/02/2024     01/15/2018     Lab Results   Component Value Date    CHOL 192 05/25/2023    CHOL 174 04/02/2019    TRIG 56 05/25/2023    TRIG 78 04/02/2019    HDL 81 05/25/2023    HDL 65 04/02/2019     No results found for: \"BNP\"    ECG personally reviewed by myself shows sinus bradycardia, within normal limits.         Review of Systems:     Review of Systems:   Enc Vitals  BP: 110/80  Pulse: 55  Resp: 16  Weight: 72.6 kg (160 lb)  Height: 172.7 cm (5' 8\")                                              Thank you for allowing me to participate in the care of your patient.      Sincerely,     CHRIS ORANTES MD     [facility]  cc:   Catherine Orantes MD  [address], [address]  [place] [address]      "

## 2024-08-20 NOTE — PROGRESS NOTES
[facility] Heart Care Office Consult     Assessment:   (I48.0) Paroxysmal atrial fibrillation (H)  (primary encounter diagnosis)  Comment: Symptomatic, not valvula, short bursts.  For the most part controlled metoprolol.  Her [id number] or is now 2, we will give her Eliquis 5 lisinopril twice daily to take at the onset of atrial fibrillation since it is very symptomatic.  Will arrange for 2-week event monitor to see if she is having asymptomatic spells.  Lastly we will go ahead and get echocardiogram look for structural heart disease.  Suspect she will be needed ablation at sometime in the future and at this point she would rather wait and I concur.  Electrolytes were unremarkable as was thyroid, she knows to try to limit her caffeine intake, she drinks rare alcohol, she does not use nicotine and she does not snore.    (E89.0) Postsurgical hypothyroidism  Comment: So noted on supplement with good TSH.     Plan:   1.  Decrease caffeine if possible.  2.  Decrease chocolate if possible.  3.  Arrange for echocardiogram look for structural heart disease.  4.  2-week event monitor to look for asymptomatic atrial fibrillation.  5.  Eliquis 5 mg by mouth twice daily to take when she goes into A-fib that does not go away.  6.  Continue as needed metoprolol.  7.  Contact me if any issues so we can talk about needing to schedule ablation.  8.  Follow-up with me in 1 year or sooner if needed.    History of Present Illness:   Thank you for asking the [facility] Heart Care team to see Marilee Weston a 64 year old female  in consultation  to evaluate atrial fibrillation.   Patient has been in her usual state of health till about 10 or 15 or so years ago when she went to get a calcium score is noted to have score of 0.  She then mentioned that she was having some palpitations and she wore heart monitor was noted to have 3% atrial fibrillation.  She states roughly about twice a year, for maybe an hour to 2 hours at a  time, she will feel her heart racing internally with a fullness in her neck.  There is no associated shortness of breath or lightheadedness with this.  She tells me that the can come on after eating excessive amount of chocolate chips which occurred just approximately 1 week or so ago.    Past Medical History:     Past Medical History:   Diagnosis Date    Depression     Fibrocystic breast     Osteopenia     Paroxysmal atrial fibrillation (H)     Postsurgical hypothyroidism      Past history is negative for cancer, tuberculosis, diabetes mellitus, myocardial infarction,  rheumatic fever, hypertension, cerebrovascular accident, chronic kidney disease, peptic ulcer disease, chronic obstructive pulmonary disease, and no lipid disorder.     Past Surgical History:     Past Surgical History:   Procedure Laterality Date    BIOPSY BREAST Right     clogged duct    PILONIDAL CYST / SINUS EXCISION  1979    STRIP VEIN  1975, 1986    THYROIDECTOMY  1985    benign tumor    THYROIDECTOMY   1986    Partial for benign thyroid tumor    VEIN LIGATION AND STRIPPING Right 1975    x2    VEIN SURGERY  2015     Family History:   Family history negative for coronary artery disease.    Social History:   She lives independently with her , works managing real estate, reports that she quit smoking about 40 years ago. Her smoking use included cigarettes. She has never used smokeless tobacco. She reports current alcohol use of about 7.0 standard drinks of alcohol per week. She reports that she does not use drugs.  She drinks 2-3 caffeinated beverages a day. The primary care physician is Chang Vora    Meds:   Scheduled Meds:  Current Outpatient Medications   Medication Sig Dispense Refill    apixaban ANTICOAGULANT (ELIQUIS) 5 MG tablet Take 1 tablet (5 mg) by mouth 2 times daily 40 tablet 2    calcium carbonate (OS-AGUSTINA) 1500 (600 Ca) MG tablet       calcium carbonate 600 mg-vitamin D 400 units (CALTRATE) 600-400 MG-UNIT per tablet Take  "1 tablet by mouth 2 times daily      celecoxib (CELEBREX) 200 MG capsule TAKE 1 CAPSULE(200 MG) BY MOUTH DAILY Strength: 200 mg 90 capsule 1    DULoxetine (CYMBALTA) 30 MG capsule Take 1 capsule (30 mg) by mouth daily 90 capsule 0    estradiol (ESTRACE) 0.1 MG/GM vaginal cream       estradiol (VAGIFEM) 10 MCG TABS vaginal tablet Place 10 mcg vaginally daily      fish oil-omega-3 fatty acids 1000 MG capsule Take 1 g by mouth 2 times daily.      Flaxseed, Linseed, (FLAXSEED OIL PO)       levothyroxine (SYNTHROID) 100 MCG tablet Take 1 tablet (100 mcg) by mouth daily 90 tablet 4    metoprolol succinate ER (TOPROL XL) 50 MG 24 hr tablet Take 1.5 tablets (75 mg) by mouth daily 135 tablet 4    metoprolol tartrate (LOPRESSOR) 25 MG tablet Take 1 tablet (25 mg) by mouth as needed (Take 25mg for AF episodes, if persists after 1-2 hours, take another 25mg.) 60 tablet 0    mometasone-formoterol (DULERA) 100-5 MCG/ACT inhaler Inhale 2 puffs into the lungs 2 times daily 8.8 g 0    polyethylene glycol (MIRALAX) 17 GM/Dose powder       TYRVAYA 0.03 MG/ACT nasal spray Spray 1 spray into both nostrils 2 times daily      XIIDRA 5 % opthalmic solution INSTILL 1 DROP IN BOTH EYES TWICE DAILY 12 HOURS APART         PRN Meds:.  No current facility-administered medications for this visit.       Allergies:   Penicillins    Objective:      Physical Exam  72.6 kg (160 lb)  1.727 m (5' 8\")  Body mass index is 24.33 kg/m .  /80 (BP Location: Left arm, Patient Position: Sitting, Cuff Size: Adult Regular)   Pulse 55   Resp 16   Ht 1.727 m (5' 8\")   Wt 72.6 kg (160 lb)   LMP 07/06/2008 (Approximate)   BMI 24.33 kg/m      General Appearance:   Alert, cooperative and in no acute distress.   HEENT:  No scleral icterus; the mucous membranes were pink and moist.   Neck: JVP normal. No thyromegaly. No HJR   Chest: The spine was straight. The chest was symmetric.   Lungs:   Respirations unlabored; the lungs are clear to auscultation. " "  Cardiovascular:   S1 and S2 without murmur, clicks or rubs. Brachial, radial, carotid and posterior tibial pulses are intact and symetrical.  No carotid bruits noted   Abdomen:  No organomegaly, masses, bruits, or tenderness. Bowels sounds are present   Extremities: No cyanosis, clubbing, or edema.   Skin: No xanthelasma.   Neurologic: Mood and affect are appropriate.         Lab Reviewed Personally by myself  Lab Results   Component Value Date     07/02/2024     01/15/2018    CO2 28 07/02/2024    CO2 25 06/14/2021    CO2 31 01/15/2018    BUN 17.9 07/02/2024    BUN 18 06/14/2021    BUN 16 01/15/2018     Lab Results   Component Value Date    WBC 4.3 07/02/2024    WBC 5.2 01/15/2018    HGB 13.9 07/02/2024    HGB 13.4 01/15/2018    HCT 42.9 07/02/2024    HCT 41.5 01/15/2018    MCV 96 07/02/2024    MCV 97 01/15/2018     07/02/2024     01/15/2018     Lab Results   Component Value Date    CHOL 192 05/25/2023    CHOL 174 04/02/2019    TRIG 56 05/25/2023    TRIG 78 04/02/2019    HDL 81 05/25/2023    HDL 65 04/02/2019     No results found for: \"BNP\"    ECG personally reviewed by myself shows sinus bradycardia, within normal limits.         Review of Systems:     Review of Systems:   Enc Vitals  BP: 110/80  Pulse: 55  Resp: 16  Weight: 72.6 kg (160 lb)  Height: 172.7 cm (5' 8\")                                          "

## 2024-08-20 NOTE — PATIENT INSTRUCTIONS
Marilee,  It was nice to see you again.  Per our conversation you're an abnormal heartbeat atrial fib and the reason I am checking the ultrasound of the heart and the heart monitor.  We will call you the results of these tests.  In the interim continue meds as doing and if any issues call my nurse Tiffanie [phone].  In case of a fib we would take the ELIQUIS 5 mg twice a day.  I will plan on seeing you in 1 year or sooner if needed.  James Orantes

## 2024-08-28 DIAGNOSIS — E89.0 POSTSURGICAL HYPOTHYROIDISM: ICD-10-CM

## 2024-08-28 RX ORDER — LEVOTHYROXINE SODIUM 100 UG/1
100 TABLET ORAL DAILY
Qty: 90 TABLET | Refills: 2 | Status: SHIPPED | OUTPATIENT
Start: 2024-08-28

## 2024-09-11 ENCOUNTER — HOSPITAL ENCOUNTER (OUTPATIENT)
Dept: CARDIOLOGY | Facility: HOSPITAL | Age: 64
Discharge: HOME | End: 2024-09-11
Attending: INTERNAL MEDICINE | Admitting: INTERNAL MEDICINE
Payer: MEDICARE

## 2024-09-11 DIAGNOSIS — I48.0 PAROXYSMAL ATRIAL FIBRILLATION (H): ICD-10-CM

## 2024-09-11 LAB — LVEF ECHO: NORMAL

## 2024-09-11 PROCEDURE — 93306 TTE W/DOPPLER COMPLETE: CPT

## 2024-09-11 PROCEDURE — 93306 TTE W/DOPPLER COMPLETE: CPT | Mod: 26 | Performed by: INTERNAL MEDICINE

## 2024-09-20 ENCOUNTER — ORDERS ONLY (AUTO-RELEASED) (OUTPATIENT)
Dept: CARDIOLOGY | Facility: CLINIC | Age: 64
End: 2024-09-20
Payer: MEDICARE

## 2024-09-20 DIAGNOSIS — I48.0 PAROXYSMAL ATRIAL FIBRILLATION (H): ICD-10-CM

## 2024-10-01 ENCOUNTER — MYC MEDICAL ADVICE (OUTPATIENT)
Dept: CARDIOLOGY | Facility: CLINIC | Age: 64
End: 2024-10-01
Payer: MEDICARE

## 2024-10-02 NOTE — TELEPHONE ENCOUNTER
Per Zio patch order:    Writer called [employer] and they confirmed it was for 14 days in their system- responded to patient. [place] [place]       [place]    Ordered On Ordered By    8/20/2024  2:22 PM Catherine Orantes MD             Order Questions    Question Answer   Order completed for? Adult Cardiology   Patient should wear the monitor for 14 days   Does the patient have an Neither

## 2024-10-12 ENCOUNTER — TRANSFERRED RECORDS (OUTPATIENT)
Dept: HEALTH INFORMATION MANAGEMENT | Facility: CLINIC | Age: 64
End: 2024-10-12
Payer: MEDICARE

## 2024-11-06 ENCOUNTER — TRANSFERRED RECORDS (OUTPATIENT)
Dept: HEALTH INFORMATION MANAGEMENT | Facility: CLINIC | Age: 64
End: 2024-11-06
Payer: MEDICARE

## 2024-11-07 DIAGNOSIS — M54.50 LUMBAR SPINE PAIN: ICD-10-CM

## 2024-11-07 DIAGNOSIS — M43.16 SPONDYLOLISTHESIS OF LUMBAR REGION: ICD-10-CM

## 2024-11-07 RX ORDER — CELECOXIB 200 MG/1
CAPSULE ORAL
Qty: 90 CAPSULE | Refills: 1 | Status: SHIPPED | OUTPATIENT
Start: 2024-11-07

## 2024-11-07 RX ORDER — DULOXETIN HYDROCHLORIDE 30 MG/1
30 CAPSULE, DELAYED RELEASE ORAL DAILY
Qty: 90 CAPSULE | Refills: 0 | Status: SHIPPED | OUTPATIENT
Start: 2024-11-07

## 2024-12-19 ENCOUNTER — VIRTUAL VISIT (OUTPATIENT)
Dept: URGENT CARE | Facility: CLINIC | Age: 64
End: 2024-12-19
Payer: MEDICARE

## 2024-12-19 DIAGNOSIS — J01.00 ACUTE NON-RECURRENT MAXILLARY SINUSITIS: Primary | ICD-10-CM

## 2024-12-19 RX ORDER — PREDNISONE 20 MG/1
20 TABLET ORAL DAILY
Qty: 5 TABLET | Refills: 0 | Status: SHIPPED | OUTPATIENT
Start: 2024-12-19 | End: 2024-12-24

## 2024-12-19 RX ORDER — DOXYCYCLINE 100 MG/1
100 CAPSULE ORAL 2 TIMES DAILY
Qty: 20 CAPSULE | Refills: 0 | Status: SHIPPED | OUTPATIENT
Start: 2024-12-19 | End: 2024-12-29

## 2024-12-19 NOTE — PROGRESS NOTES
Marilee is a 64 year old who is being evaluated via a billable video visit.        Assessment & Plan     Acute non-recurrent maxillary sinusitis    Since symptoms improved and then worsened again, likely bacterial sinusitis. Treat with antibiotics and steroids if needed.    - doxycycline hyclate (VIBRAMYCIN) 100 MG capsule; Take 1 capsule (100 mg) by mouth 2 times daily for 10 days.  - predniSONE (DELTASONE) 20 MG tablet; Take 1 tablet (20 mg) by mouth daily for 5 days.        The longitudinal plan of care for the diagnosis(es)/condition(s) as documented were addressed during this visit. Due to the added complexity in care, I will continue to support Mairlee in the subsequent management and with ongoing continuity of care.        No follow-ups on file.    Subjective   Marilee is a 64 year old, presenting for the following health issues:  No chief complaint on file.      Video Start Time: 3:27 PM    HPI     Acute Illness  Acute illness concerns: sinus pressure  Onset/Duration: 1 week- started for a few days and then started to feel a little better for a couple days then worsened yesterday  Symptoms:  Fever: No- low grade  Chills/Sweats: YES  Headache (location?): YES  Sinus Pressure: YES  Conjunctivitis:  No- burning  Ear Pain: YES- plugged and itchy  Rhinorrhea: No  Congestion: YES  Sore Throat: YES- and losing voice  Cough: YES - dry  Wheeze: No  Decreased Appetite: No  Nausea: No  Vomiting: No  Diarrhea: No  Dysuria/Freq.: No  Dysuria or Hematuria: No  Fatigue/Achiness: YES  Sick/Strep Exposure: YES  Therapies tried and outcome: saline, afrin, mucinex- not helping        Review of Systems  Constitutional, HEENT, cardiovascular, pulmonary, gi and gu systems are negative, except as otherwise noted.      Objective           Vitals:  No vitals were obtained today due to virtual visit.    Physical Exam   GENERAL: alert and no distress  EYES: Eyes grossly normal to inspection.  No discharge or erythema, or obvious  scleral/conjunctival abnormalities.  HENT: Normal cephalic/atraumatic.  External ears, nose and mouth without ulcers or lesions.  No nasal drainage visible.  NECK: No asymmetry, visible masses or scars  RESP: No audible wheeze, cough, or visible cyanosis.    SKIN: Visible skin clear. No significant rash, abnormal pigmentation or lesions.  NEURO: Cranial nerves grossly intact.  Mentation and speech appropriate for age.  PSYCH: Appropriate affect, tone, and pace of words          Video-Visit Details    Type of service:  Video Visit   Video End Time:3:35 PM  Originating Location (pt. Location): Home    Distant Location (provider location):  Off-site  Platform used for Video Visit: Guillaume  Signed Electronically by: [facility]

## 2025-02-03 DIAGNOSIS — M43.16 SPONDYLOLISTHESIS OF LUMBAR REGION: ICD-10-CM

## 2025-02-04 RX ORDER — DULOXETIN HYDROCHLORIDE 30 MG/1
30 CAPSULE, DELAYED RELEASE ORAL DAILY
Qty: 90 CAPSULE | Refills: 0 | Status: SHIPPED | OUTPATIENT
Start: 2025-02-04

## 2025-02-09 DIAGNOSIS — M43.16 SPONDYLOLISTHESIS OF LUMBAR REGION: ICD-10-CM

## 2025-02-10 RX ORDER — DULOXETIN HYDROCHLORIDE 30 MG/1
30 CAPSULE, DELAYED RELEASE ORAL DAILY
Qty: 90 CAPSULE | Refills: 0 | OUTPATIENT
Start: 2025-02-10

## 2025-05-12 DIAGNOSIS — J01.00 ACUTE NON-RECURRENT MAXILLARY SINUSITIS: ICD-10-CM

## 2025-05-12 RX ORDER — DOXYCYCLINE 100 MG/1
CAPSULE ORAL
Qty: 20 CAPSULE | Refills: 0 | Status: SHIPPED | OUTPATIENT
Start: 2025-05-12

## 2025-05-13 DIAGNOSIS — M54.50 LUMBAR SPINE PAIN: ICD-10-CM

## 2025-05-13 RX ORDER — CELECOXIB 200 MG/1
CAPSULE ORAL
Qty: 90 CAPSULE | Refills: 1 | Status: SHIPPED | OUTPATIENT
Start: 2025-05-13

## 2025-05-14 DIAGNOSIS — M43.16 SPONDYLOLISTHESIS OF LUMBAR REGION: ICD-10-CM

## 2025-05-14 RX ORDER — DULOXETIN HYDROCHLORIDE 30 MG/1
30 CAPSULE, DELAYED RELEASE ORAL DAILY
Qty: 90 CAPSULE | Refills: 0 | OUTPATIENT
Start: 2025-05-14

## 2025-05-14 RX ORDER — DULOXETIN HYDROCHLORIDE 30 MG/1
30 CAPSULE, DELAYED RELEASE ORAL DAILY
Qty: 90 CAPSULE | Refills: 4 | Status: SHIPPED | OUTPATIENT
Start: 2025-05-14

## 2025-05-14 NOTE — TELEPHONE ENCOUNTER
It has been over 1 year since patient's last visit.  Needs to get this refilled through PCP or have an office visit/follow-up.

## 2025-06-02 ENCOUNTER — PATIENT OUTREACH (OUTPATIENT)
Dept: CARE COORDINATION | Facility: CLINIC | Age: 65
End: 2025-06-02

## 2025-06-02 ENCOUNTER — ANCILLARY PROCEDURE (OUTPATIENT)
Dept: MAMMOGRAPHY | Facility: CLINIC | Age: 65
End: 2025-06-02
Attending: INTERNAL MEDICINE
Payer: MEDICARE

## 2025-06-02 DIAGNOSIS — Z12.31 VISIT FOR SCREENING MAMMOGRAM: ICD-10-CM

## 2025-06-02 PROCEDURE — 77063 BREAST TOMOSYNTHESIS BI: CPT

## 2025-06-05 DIAGNOSIS — E89.0 POSTSURGICAL HYPOTHYROIDISM: ICD-10-CM

## 2025-06-05 RX ORDER — LEVOTHYROXINE SODIUM 100 UG/1
100 TABLET ORAL DAILY
Qty: 90 TABLET | Refills: 0 | Status: SHIPPED | OUTPATIENT
Start: 2025-06-05

## 2025-06-30 SDOH — HEALTH STABILITY: PHYSICAL HEALTH: ON AVERAGE, HOW MANY DAYS PER WEEK DO YOU ENGAGE IN MODERATE TO STRENUOUS EXERCISE (LIKE A BRISK WALK)?: 5 DAYS

## 2025-06-30 SDOH — HEALTH STABILITY: PHYSICAL HEALTH: ON AVERAGE, HOW MANY MINUTES DO YOU ENGAGE IN EXERCISE AT THIS LEVEL?: 30 MIN

## 2025-06-30 ASSESSMENT — SOCIAL DETERMINANTS OF HEALTH (SDOH): HOW OFTEN DO YOU GET TOGETHER WITH FRIENDS OR RELATIVES?: MORE THAN THREE TIMES A WEEK

## 2025-07-02 ENCOUNTER — TRANSFERRED RECORDS (OUTPATIENT)
Dept: HEALTH INFORMATION MANAGEMENT | Facility: CLINIC | Age: 65
End: 2025-07-02
Payer: MEDICARE

## 2025-07-03 ENCOUNTER — OFFICE VISIT (OUTPATIENT)
Dept: INTERNAL MEDICINE | Facility: CLINIC | Age: 65
End: 2025-07-03
Payer: MEDICARE

## 2025-07-03 VITALS
BODY MASS INDEX: 24.05 KG/M2 | TEMPERATURE: 98.4 F | OXYGEN SATURATION: 98 % | SYSTOLIC BLOOD PRESSURE: 122 MMHG | RESPIRATION RATE: 14 BRPM | WEIGHT: 162.4 LBS | HEIGHT: 69 IN | HEART RATE: 67 BPM | DIASTOLIC BLOOD PRESSURE: 66 MMHG

## 2025-07-03 DIAGNOSIS — R22.41 SUBCUTANEOUS NODULE OF RIGHT LOWER EXTREMITY: ICD-10-CM

## 2025-07-03 DIAGNOSIS — I48.0 PAROXYSMAL ATRIAL FIBRILLATION (H): ICD-10-CM

## 2025-07-03 DIAGNOSIS — N95.2 ATROPHIC VAGINITIS: ICD-10-CM

## 2025-07-03 DIAGNOSIS — E89.0 POSTSURGICAL HYPOTHYROIDISM: ICD-10-CM

## 2025-07-03 DIAGNOSIS — Z00.00 ANNUAL PHYSICAL EXAM: Primary | ICD-10-CM

## 2025-07-03 DIAGNOSIS — E78.2 MIXED HYPERLIPIDEMIA: ICD-10-CM

## 2025-07-03 DIAGNOSIS — I83.813 VARICOSE VEINS OF BOTH LOWER EXTREMITIES WITH PAIN: ICD-10-CM

## 2025-07-03 DIAGNOSIS — M85.80 OSTEOPENIA, UNSPECIFIED LOCATION: ICD-10-CM

## 2025-07-03 DIAGNOSIS — E55.9 VITAMIN D DEFICIENCY: ICD-10-CM

## 2025-07-03 DIAGNOSIS — H91.90 HARD OF HEARING: ICD-10-CM

## 2025-07-03 PROBLEM — Z98.890 H/O THYROIDECTOMY: Status: RESOLVED | Noted: 2019-02-13 | Resolved: 2025-07-03

## 2025-07-03 PROBLEM — Z90.89 H/O THYROIDECTOMY: Status: RESOLVED | Noted: 2019-02-13 | Resolved: 2025-07-03

## 2025-07-03 PROBLEM — M25.311 ROTATOR CUFF INSUFFICIENCY OF RIGHT SHOULDER: Status: RESOLVED | Noted: 2019-02-13 | Resolved: 2025-07-03

## 2025-07-03 LAB
ALBUMIN SERPL BCG-MCNC: 4.3 G/DL (ref 3.5–5.2)
ALP SERPL-CCNC: 63 U/L (ref 40–150)
ALT SERPL W P-5'-P-CCNC: 23 U/L (ref 0–50)
ANION GAP SERPL CALCULATED.3IONS-SCNC: 10 MMOL/L (ref 7–15)
AST SERPL W P-5'-P-CCNC: 28 U/L (ref 0–45)
BILIRUB SERPL-MCNC: 0.6 MG/DL
BUN SERPL-MCNC: 20 MG/DL (ref 8–23)
CALCIUM SERPL-MCNC: 9.2 MG/DL (ref 8.8–10.4)
CHLORIDE SERPL-SCNC: 106 MMOL/L (ref 98–107)
CHOLEST SERPL-MCNC: 194 MG/DL
CREAT SERPL-MCNC: 0.85 MG/DL (ref 0.51–0.95)
EGFRCR SERPLBLD CKD-EPI 2021: 75 ML/MIN/1.73M2
ERYTHROCYTE [DISTWIDTH] IN BLOOD BY AUTOMATED COUNT: 12.4 % (ref 10–15)
FASTING STATUS PATIENT QL REPORTED: NO
FASTING STATUS PATIENT QL REPORTED: NO
GLUCOSE SERPL-MCNC: 99 MG/DL (ref 70–99)
HCO3 SERPL-SCNC: 27 MMOL/L (ref 22–29)
HCT VFR BLD AUTO: 39.6 % (ref 35–47)
HDLC SERPL-MCNC: 78 MG/DL
HGB BLD-MCNC: 13.1 G/DL (ref 11.7–15.7)
LDLC SERPL CALC-MCNC: 99 MG/DL
MCH RBC QN AUTO: 31.5 PG (ref 26.5–33)
MCHC RBC AUTO-ENTMCNC: 33.1 G/DL (ref 31.5–36.5)
MCV RBC AUTO: 95 FL (ref 78–100)
NONHDLC SERPL-MCNC: 116 MG/DL
PLATELET # BLD AUTO: 192 10E3/UL (ref 150–450)
POTASSIUM SERPL-SCNC: 4.1 MMOL/L (ref 3.4–5.3)
PROT SERPL-MCNC: 6.6 G/DL (ref 6.4–8.3)
RBC # BLD AUTO: 4.16 10E6/UL (ref 3.8–5.2)
SODIUM SERPL-SCNC: 143 MMOL/L (ref 135–145)
TRIGL SERPL-MCNC: 87 MG/DL
TSH SERPL DL<=0.005 MIU/L-ACNC: 0.32 UIU/ML (ref 0.3–4.2)
VIT D+METAB SERPL-MCNC: 37 NG/ML (ref 20–50)
WBC # BLD AUTO: 4.8 10E3/UL (ref 4–11)

## 2025-07-03 RX ORDER — ESTRADIOL 10 UG/1
10 TABLET, FILM COATED VAGINAL
Qty: 38 TABLET | Refills: 4 | Status: SHIPPED | OUTPATIENT
Start: 2025-07-04

## 2025-07-03 RX ORDER — CHOLECALCIFEROL (VITAMIN D3) 10(400)/ML
150 DROPS ORAL DAILY
COMMUNITY
Start: 2025-07-03

## 2025-07-03 RX ORDER — ESTRADIOL 10 UG/1
10 TABLET, FILM COATED VAGINAL
Qty: 36 TABLET | Refills: 4 | Status: SHIPPED | OUTPATIENT
Start: 2025-07-04 | End: 2025-07-03

## 2025-07-03 RX ORDER — MAGNESIUM OXIDE 400 MG/1
800 TABLET ORAL DAILY
COMMUNITY
Start: 2025-07-03

## 2025-07-03 NOTE — PATIENT INSTRUCTIONS
Patient Education   Preventive Care Advice   This is general advice given by our system to help you stay healthy. However, your care team may have specific advice just for you. Please talk to your care team about your preventive care needs.  Nutrition  Eat 5 or more servings of fruits and vegetables each day.  Try wheat bread, brown rice and whole grain pasta (instead of white bread, rice, and pasta).  Get enough calcium and vitamin D. Check the label on foods and aim for 100% of the RDA (recommended daily allowance).  Lifestyle  Exercise at least 150 minutes each week  (30 minutes a day, 5 days a week).  Do muscle strengthening activities 2 days a week. These help control your weight and prevent disease.  No smoking.  Wear sunscreen to prevent skin cancer.  Have a dental exam and cleaning every 6 months.  Yearly exams  See your health care team every year to talk about:  Any changes in your health.  Any medicines your care team has prescribed.  Preventive care, family planning, and ways to prevent chronic diseases.  Shots (vaccines)   HPV shots (up to age 26), if you've never had them before.  Hepatitis B shots (up to age 59), if you've never had them before.  COVID-19 shot: Get this shot when it's due.  Flu shot: Get a flu shot every year.  Tetanus shot: Get a tetanus shot every 10 years.  Pneumococcal, hepatitis A, and RSV shots: Ask your care team if you need these based on your risk.  Shingles shot (for age 50 and up)  General health tests  Diabetes screening:  Starting at age 35, Get screened for diabetes at least every 3 years.  If you are younger than age 35, ask your care team if you should be screened for diabetes.  Cholesterol test: At age 39, start having a cholesterol test every 5 years, or more often if advised.  Bone density scan (DEXA): At age 50, ask your care team if you should have this scan for osteoporosis (brittle bones).  Hepatitis C: Get tested at least once in your life.  STIs (sexually  transmitted infections)  Before age 24: Ask your care team if you should be screened for STIs.  After age 24: Get screened for STIs if you're at risk. You are at risk for STIs (including HIV) if:  You are sexually active with more than one person.  You don't use condoms every time.  You or a partner was diagnosed with a sexually transmitted infection.  If you are at risk for HIV, ask about PrEP medicine to prevent HIV.  Get tested for HIV at least once in your life, whether you are at risk for HIV or not.  Cancer screening tests  Cervical cancer screening: If you have a cervix, begin getting regular cervical cancer screening tests starting at age 21.  Breast cancer scan (mammogram): If you've ever had breasts, begin having regular mammograms starting at age 40. This is a scan to check for breast cancer.  Colon cancer screening: It is important to start screening for colon cancer at age 45.  Have a colonoscopy test every 10 years (or more often if you're at risk) Or, ask your provider about stool tests like a FIT test every year or Cologuard test every 3 years.  To learn more about your testing options, visit:   .  For help making a decision, visit:   https://bit.ly/re32604.  Prostate cancer screening test: If you have a prostate, ask your care team if a prostate cancer screening test (PSA) at age 55 is right for you.  Lung cancer screening: If you are a current or former smoker ages 50 to 80, ask your care team if ongoing lung cancer screenings are right for you.  For informational purposes only. Not to replace the advice of your health care provider. Copyright   2023 [facility]. All rights reserved. Clinically reviewed by the [facility]. eASIC 451320 - REV 01/24.  Hearing Loss: Care Instructions  Overview     Hearing loss is a sudden or slow decrease in how well you hear. It can range from slight to profound. Permanent hearing loss can occur with aging. It also can  happen when you are exposed long-term to loud noise. Examples include listening to loud music, riding motorcycles, or being around other loud machines.  Hearing loss can affect your work and home life. It can make you feel lonely or depressed. You may feel that you have lost your independence. But hearing aids and other devices can help you hear better and feel connected to others.  Follow-up care is a key part of your treatment and safety. Be sure to make and go to all appointments, and call your doctor if you are having problems. It's also a good idea to know your test results and keep a list of the medicines you take.  How can you care for yourself at home?  Avoid loud noises whenever possible. This helps keep your hearing from getting worse.  Always wear hearing protection around loud noises.  Wear a hearing aid as directed.  A professional can help you pick a hearing aid that will work best for you.  You can also get hearing aids over the counter for mild to moderate hearing loss.  Have hearing tests as your doctor suggests. They can show whether your hearing has changed. Your hearing aid may need to be adjusted.  Use other devices as needed. These may include:  Telephone amplifiers and hearing aids that can connect to a television, stereo, radio, or microphone.  Devices that use lights or vibrations. These alert you to the doorbell, a ringing telephone, or a baby monitor.  Television closed-captioning. This shows the words at the bottom of the screen. Most new TVs can do this.  TTY (text telephone). This lets you type messages back and forth on the telephone instead of talking or listening. These devices are also called TDD. When messages are typed on the keyboard, they are sent over the phone line to a receiving TTY. The message is shown on a monitor.  Use text messaging, social media, and email if it is hard for you to communicate by telephone.  Try to learn a listening technique called speechreading. It is  "not lipreading. You pay attention to people's gestures, expressions, posture, and tone of voice. These clues can help you understand what a person is saying. Face the person you are talking to, and have them face you. Make sure the lighting is good. You need to see the other person's face clearly.  Think about counseling if you need help to adjust to your hearing loss.  When should you call for help?  Watch closely for changes in your health, and be sure to contact your doctor if:    You think your hearing is getting worse.     You have new symptoms, such as dizziness or nausea.   Where can you learn more?  Go to https://www.ProtAb.net/patiented  Enter R798 in the search box to learn more about \"Hearing Loss: Care Instructions.\"  Current as of: October 27, 2024  Content Version: 14.5    3378-2149 [employer].   Care instructions adapted under license by your healthcare professional. If you have questions about a medical condition or this instruction, always ask your healthcare professional. [employer] disclaims any warranty or liability for your use of this information.    Bladder Training: Care Instructions  Your Care Instructions     Bladder training is used to treat urge incontinence and stress incontinence. Urge incontinence means that the need to urinate comes on so fast that you can't get to a toilet in time. Stress incontinence means that you leak urine because of pressure on your bladder. For example, it may happen when you laugh, cough, or lift something heavy.  Bladder training can increase how long you can wait before you have to urinate. It can also help your bladder hold more urine. And it can give you better control over the urge to urinate.  It is important to remember that bladder training takes a few weeks to a few months to make a difference. You may not see results right away, but don't give up.  Follow-up care is a key part of your treatment and safety. Be sure to make " and go to all appointments, and call your doctor if you are having problems. It's also a good idea to know your test results and keep a list of the medicines you take.  How can you care for yourself at home?  Work with your doctor to come up with a bladder training program that is right for you. You may use one or more of the following methods.  Delayed urination  In the beginning, try to keep from urinating for 5 minutes after you first feel the need to go.  While you wait, take deep, slow breaths to relax. Kegel exercises can also help you delay the need to go to the bathroom.  After some practice, when you can easily wait 5 minutes to urinate, try to wait 10 minutes before you urinate.  Slowly increase the waiting period until you are able to control when you have to urinate.  Scheduled urination  Empty your bladder when you first wake up in the morning.  Schedule times throughout the day when you will urinate.  Start by going to the bathroom every hour, even if you don't need to go.  Slowly increase the time between trips to the bathroom.  When you have found a schedule that works well for you, keep doing it.  If you wake up during the night and have to urinate, do it. Apply your schedule to waking hours only.  Kegel exercises  These tighten and strengthen pelvic muscles, which can help you control the flow of urine. (If doing these exercises causes pain, stop doing them and talk with your doctor.) To do Kegel exercises:  Squeeze your muscles as if you were trying not to pass gas. Or squeeze your muscles as if you were stopping the flow of urine. Your belly, legs, and buttocks shouldn't move.  Hold the squeeze for 3 seconds, then relax for 5 to 10 seconds.  Start with 3 seconds, then add 1 second each week until you are able to squeeze for 10 seconds.  Repeat the exercise 10 times a session. Do 3 to 8 sessions a day.  When should you call for help?  Watch closely for changes in your health, and be sure to  "contact your doctor if:    Your incontinence is getting worse.     You do not get better as expected.   Where can you learn more?  Go to https://www.Box.net/patiented  Enter V684 in the search box to learn more about \"Bladder Training: Care Instructions.\"  Current as of: April 30, 2024  Content Version: 14.5 2024-2025 [employer].   Care instructions adapted under license by your healthcare professional. If you have questions about a medical condition or this instruction, always ask your healthcare professional. [employer] disclaims any warranty or liability for your use of this information.       "

## 2025-07-03 NOTE — PROGRESS NOTES
Preventive Care Visit  [facility]  Chang Vora MD, Internal Medicine  Jul 3, 2025      Assessment & Plan     1. Annual physical exam (Primary)  Is a 65-year-old woman with issues as discussed below.  Ongoing ocular s/p recommended.    2. Paroxysmal atrial fibrillation (H)  Continue with current strategy outlined by cardiology    3. Postsurgical hypothyroidism  Continue levothyroxine  - TSH WITH FREE T4 REFLEX; Future  - TSH WITH FREE T4 REFLEX    4. Mixed hyperlipidemia  - CBC with platelets; Future  - Comprehensive metabolic panel; Future  - Lipid panel reflex to direct LDL Fasting; Future  - LipoFit by NMR; Future  - CBC with platelets  - Comprehensive metabolic panel  - Lipid panel reflex to direct LDL Fasting  - LipoFit by NMR    5. Osteopenia, unspecified location  6. Vitamin D deficiency  Adequate calcium vitamin D and weightbearing exercise likely repeat bone density in the next 1 to 2 years  - Vitamin D Deficiency; Future  - Vitamin D Deficiency    7. Varicose veins of both lower extremities with pain  Previously seen Dr. Keshawn Pablo, having more discomfort  - Adult Gen Surg  Referral; Future    8. Subcutaneous nodule of right lower extremity  - XR Femur Right 2 Views; Future  - Adult Gen Surg  Referral; Future    9. Atrophic vaginitis  - estradiol (VAGIFEM) 10 MCG TABS vaginal tablet; Place 1 tablet (10 mcg) vaginally three times a week.  Dispense: 36 tablet; Refill: 4    10. Hard of hearing  Uses hearing aids    The longitudinal plan of care for the diagnosis(es)/condition(s) as documented were addressed during this visit. Due to the added complexity in care, I will continue to support Marilee in the subsequent management and with ongoing continuity of care.    Reviewed preventive health counseling, as reflected in patient instructions  Counseling  Appropriate preventive services were addressed with this patient via screening, questionnaire, or discussion as  appropriate for fall prevention, nutrition, physical activity, Tobacco-use cessation, social engagement, weight loss and cognition.  Checklist reviewing preventive services available has been given to the patient.  Reviewed patient's diet, addressing concerns and/or questions.   The patient was provided with written information regarding signs of hearing loss.   Information on urinary incontinence and treatment options given to patient.     Bennett Hunt is a 65 year old, presenting for the following:  Annual Visit        7/3/2025    12:42 PM   Additional Questions   Roomed by Sheng MCCALL RN          HPI     Advance Care Planning    Discussed advance care planning with patient; informed AVS has link to [employer].        6/30/2025   General Health   How would you rate your overall physical health? Good   Feel stress (tense, anxious, or unable to sleep) Only a little   (!) STRESS CONCERN      6/30/2025   Nutrition   Diet: Regular (no restrictions)         6/30/2025   Exercise   Days per week of moderate/strenous exercise 5 days   Average minutes spent exercising at this level 30 min         6/30/2025   Social Factors   Frequency of gathering with friends or relatives More than three times a week   Worry food won't last until get money to buy more No   Food not last or not have enough money for food? No   Do you have housing? (Housing is defined as stable permanent housing and does not include staying outside in a car, in a tent, in an abandoned building, in an overnight shelter, or couch-surfing.) Yes   Are you worried about losing your housing? No   Lack of transportation? No   Unable to get utilities (heat,electricity)? No         6/30/2025   Fall Risk   Fallen 2 or more times in the past year? No   Trouble with walking or balance? No          6/30/2025   Activities of Daily Living- Home Safety   Needs help with the following daily activites None of the above   Safety concerns in the home None of the  above         2025   Dental   Dentist two times every year? Yes         2025   Hearing Screening   Hearing concerns? (!) I NEED TO ASK PEOPLE TO SPEAK UP OR REPEAT THEMSELVES.    (!) IT'S HARD TO FOLLOW A CONVERSATION IN A NOISY RESTAURANT OR CROWDED ROOM.    (!) TROUBLE UNDERSTANDING SOFT OR WHISPERED SPEECH.   Would you like a referral for hearing testing? I already have hearing aids       Multiple values from one day are sorted in reverse-chronological order         2025   Driving Risk Screening   Patient/family members have concerns about driving No         2025   General Alertness/Fatigue Screening   Have you been more tired than usual lately? No         2025   Urinary Incontinence Screening   Bothered by leaking urine in past 6 months Yes         Today's PHQ-2 Score:       7/3/2025    12:32 PM   PHQ-2 (  [employer])   Q1: Little interest or pleasure in doing things 0   Q2: Feeling down, depressed or hopeless 0   PHQ-2 Score 0    Q1: Little interest or pleasure in doing things Not at all   Q2: Feeling down, depressed or hopeless Not at all   PHQ-2 Score 0       Patient-reported           2025   Substance Use   Alcohol more than 3/day or more than 7/wk No   Do you have a current opioid prescription? No   How severe/bad is pain from 1 to 10? 5/10   Do you use any other substances recreationally? No     Social History     Tobacco Use    Smoking status: Former     Current packs/day: 0.00     Types: Cigarettes     Quit date: 1984     Years since quittin.5    Smokeless tobacco: Never   Vaping Use    Vaping status: Never Used   Substance Use Topics    Alcohol use: Yes     Alcohol/week: 7.0 standard drinks of alcohol     Comment: Occ.    Drug use: No           2025   LAST FHS-7 RESULTS   1st degree relative breast or ovarian cancer No   Any relative bilateral breast cancer No   Any male have breast cancer No   Any ONE woman have BOTH breast AND ovarian cancer No   Any woman  with breast cancer before 50yrs No   2 or more relatives with breast AND/OR ovarian cancer No   2 or more relatives with breast AND/OR bowel cancer No              Latest Ref Rng & Units 2/23/2024    11:50 AM 1/24/2023    12:07 PM   PAP / HPV   PAP  Negative for Intraepithelial Lesion or Malignancy (NILM)  Atypical squamous cells of undetermined significance (ASC-US)    HPV 16 DNA Negative Negative  Negative    HPV 18 DNA Negative Negative  Negative    Other HR HPV Negative Negative  Negative      ASCVD Risk   The 10-year ASCVD risk score (Yahir MARCH, et al., 2019) is: 5.4%    Values used to calculate the score:      Age: 65 years      Sex: Female      Is Non- : No      Diabetic: No      Tobacco smoker: No      Systolic Blood Pressure: 122 mmHg      Is BP treated: No      HDL Cholesterol: 81 mg/dL      Total Cholesterol: 192 mg/dL        Reviewed and updated as needed this visit by Provider                  Current providers sharing in care for this patient include:  Patient Care Team:  Chang Vora MD as PCP - General (Internal Medicine)  Iesha Vasquez MD as PCP - gastroentrology (Gastroenterology)  Mayra Burns MD as MD (Cardiology)  Kelley Petit RN as Specialty Care Coordinator (Cardiology)  Chang Vora MD as Assigned PCP  Libby Castorena MD as MD (OB/Gyn)  Catherine Orantes MD as MD (Cardiology)  Catherine Orantes MD as Assigned Heart and Vascular Provider    The following health maintenance items are reviewed in Epic and correct as of today:  Health Maintenance   Topic Date Due    DTAP/TDAP/TD VACCINE (1 - Tdap) Never done    RSV VACCINE (1 - Risk 60-74 years 1-dose series) Never done    COVID-19 VACCINE (6 - 2024-25 season) 09/01/2024    ANNUAL REVIEW OF HM ORDERS  07/02/2025    MEDICARE ANNUAL WELLNESS VISIT  07/02/2025    TSH W/FREE T4 REFLEX  07/02/2025    INFLUENZA VACCINE (1) 09/01/2025    MAMMO SCREENING  06/02/2026    FALL RISK ASSESSMENT   "07/03/2026    DIABETES SCREENING  07/02/2027    LIPID  05/25/2028    COLORECTAL CANCER SCREENING  06/12/2029    ADVANCE CARE PLANNING  07/02/2029    DEXA  05/30/2038    PHQ-2 (once per calendar year)  Completed    PNEUMOCOCCAL VACCINE 50+ YEARS  Completed    ZOSTER VACCINE  Completed    HPV VACCINE  Aged Out    MENINGITIS VACCINE  Aged Out    HEPATITIS C SCREENING  Discontinued    PAP  Discontinued        Objective    Exam  /66 (BP Location: Left arm, Patient Position: Sitting, Cuff Size: Adult Regular)   Pulse 67   Temp 98.4  F (36.9  C) (Tympanic)   Resp 14   Ht 1.74 m (5' 8.5\")   Wt 73.7 kg (162 lb 6.4 oz)   LMP 07/06/2008 (Approximate)   SpO2 98%   BMI 24.33 kg/m     Estimated body mass index is 24.33 kg/m  as calculated from the following:    Height as of this encounter: 1.74 m (5' 8.5\").    Weight as of this encounter: 73.7 kg (162 lb 6.4 oz).    Physical Exam  EYES: Eyelids, conjunctiva, and sclera were normal.  HEAD, EARS, NOSE, MOUTH, AND THROAT: Head and face were normal. Hearing was normal to voice and the ears were normal to external exam. Nose appearance was normal and there was no discharge.  Cerumen  NECK: Neck appearance was normal.   RESPIRATORY: Breathing pattern was normal and the chest moved symmetrically.   Lung sounds were normal and there were no abnormal sounds.  CARDIOVASCULAR: Heart rate and rhythm were normal.  S1 and S2 were normal and there were no extra sounds or murmurs.  There was no peripheral edema.  GASTROINTESTINAL: The abdomen was normal in contour.   NEUROLOGIC: The patient was alert and oriented to person, place, time, and circumstance. Speech was normal. Cranial nerves were normal. Motor strength was normal for age. The patient was normally coordinated.  Musculoskeletal, small subcutaneous nodule on the right leg  PSYCHIATRIC:  Mood and affect were normal and the patient had normal recent and remote memory. The patient's judgment and insight were normal.        " 7/3/2025   Mini Cog   Clock Draw Score 2 Normal   3 Item Recall 2 objects recalled   Mini Cog Total Score 4              Signed Electronically by: Chang Vora MD

## 2025-07-10 LAB
CHOLEST SERPL-MCNC: 201 MG/DL
HDL SERPL QN: 10.3 NM
HDL SERPL-SCNC: 34.2 UMOL/L
HDLC SERPL-MCNC: 74 MG/DL
HLD.LARGE SERPL-SCNC: 14.8 UMOL/L
LDL SERPL QN: 22.4 NM
LDL SERPL-SCNC: 868 NMOL/L
LDL SMALL SERPL-SCNC: <165 NMOL/L
LDLC SERPL CALC-MCNC: 108 MG/DL
PATHOLOGY STUDY: ABNORMAL
TRIGL SERPL-MCNC: 97 MG/DL
VLDL LARGE SERPL-SCNC: 5.1 NMOL/L
VLDL SERPL QN: 55.6 NM

## 2025-07-11 ENCOUNTER — TRANSFERRED RECORDS (OUTPATIENT)
Dept: HEALTH INFORMATION MANAGEMENT | Facility: CLINIC | Age: 65
End: 2025-07-11
Payer: MEDICARE

## 2025-08-11 ENCOUNTER — TRANSFERRED RECORDS (OUTPATIENT)
Dept: HEALTH INFORMATION MANAGEMENT | Facility: CLINIC | Age: 65
End: 2025-08-11
Payer: MEDICARE

## 2025-08-13 ENCOUNTER — ANCILLARY PROCEDURE (OUTPATIENT)
Dept: VASCULAR ULTRASOUND | Facility: CLINIC | Age: 65
End: 2025-08-13
Attending: SPECIALIST
Payer: MEDICARE

## 2025-08-13 ENCOUNTER — OFFICE VISIT (OUTPATIENT)
Dept: VASCULAR SURGERY | Facility: CLINIC | Age: 65
End: 2025-08-13
Attending: SPECIALIST
Payer: MEDICARE

## 2025-08-13 ENCOUNTER — MYC MEDICAL ADVICE (OUTPATIENT)
Dept: VASCULAR SURGERY | Facility: CLINIC | Age: 65
End: 2025-08-13

## 2025-08-13 VITALS
HEIGHT: 68 IN | OXYGEN SATURATION: 99 % | BODY MASS INDEX: 24.55 KG/M2 | WEIGHT: 162 LBS | DIASTOLIC BLOOD PRESSURE: 103 MMHG | SYSTOLIC BLOOD PRESSURE: 165 MMHG | HEART RATE: 56 BPM

## 2025-08-13 DIAGNOSIS — I83.893 SYMPTOMATIC VARICOSE VEINS OF BOTH LOWER EXTREMITIES: Primary | ICD-10-CM

## 2025-08-13 DIAGNOSIS — I83.893 SYMPTOMATIC VARICOSE VEINS OF BOTH LOWER EXTREMITIES: ICD-10-CM

## 2025-08-13 DIAGNOSIS — R22.41 SUBCUTANEOUS NODULE OF RIGHT LOWER EXTREMITY: ICD-10-CM

## 2025-08-13 DIAGNOSIS — I83.813 VARICOSE VEINS OF BOTH LOWER EXTREMITIES WITH PAIN: ICD-10-CM

## 2025-08-13 PROCEDURE — 3077F SYST BP >= 140 MM HG: CPT | Performed by: SPECIALIST

## 2025-08-13 PROCEDURE — 3080F DIAST BP >= 90 MM HG: CPT | Performed by: SPECIALIST

## 2025-08-13 PROCEDURE — 93970 EXTREMITY STUDY: CPT

## 2025-08-13 PROCEDURE — G0463 HOSPITAL OUTPT CLINIC VISIT: HCPCS | Performed by: SPECIALIST

## 2025-08-13 PROCEDURE — 99213 OFFICE O/P EST LOW 20 MIN: CPT | Performed by: SPECIALIST

## 2025-08-13 PROCEDURE — 1125F AMNT PAIN NOTED PAIN PRSNT: CPT | Performed by: SPECIALIST

## 2025-08-13 PROCEDURE — 93970 EXTREMITY STUDY: CPT | Mod: 26 | Performed by: SURGERY

## 2025-08-13 RX ORDER — ACETAMINOPHEN 325 MG/1
975 TABLET ORAL ONCE
OUTPATIENT
Start: 2025-08-13 | End: 2025-08-13

## 2025-08-13 ASSESSMENT — PAIN SCALES - GENERAL: PAINLEVEL_OUTOF10: MILD PAIN (1)

## 2025-08-15 PROBLEM — I83.893 SYMPTOMATIC VARICOSE VEINS OF BOTH LOWER EXTREMITIES: Status: ACTIVE | Noted: 2025-08-13
